# Patient Record
Sex: MALE | Race: ASIAN | NOT HISPANIC OR LATINO | URBAN - METROPOLITAN AREA
[De-identification: names, ages, dates, MRNs, and addresses within clinical notes are randomized per-mention and may not be internally consistent; named-entity substitution may affect disease eponyms.]

---

## 2021-12-22 ENCOUNTER — INPATIENT (INPATIENT)
Facility: HOSPITAL | Age: 56
LOS: 1 days | Discharge: HOME | DRG: 247 | End: 2021-12-24
Attending: HOSPITALIST | Admitting: INTERNAL MEDICINE
Payer: COMMERCIAL

## 2021-12-22 ENCOUNTER — EMERGENCY (EMERGENCY)
Facility: HOSPITAL | Age: 56
LOS: 1 days | Discharge: ANOTHER HOSPITAL | End: 2021-12-22
Attending: EMERGENCY MEDICINE
Payer: COMMERCIAL

## 2021-12-22 VITALS
DIASTOLIC BLOOD PRESSURE: 82 MMHG | TEMPERATURE: 98 F | RESPIRATION RATE: 18 BRPM | WEIGHT: 190.04 LBS | HEART RATE: 78 BPM | OXYGEN SATURATION: 98 % | HEIGHT: 71 IN | SYSTOLIC BLOOD PRESSURE: 134 MMHG

## 2021-12-22 VITALS
SYSTOLIC BLOOD PRESSURE: 131 MMHG | OXYGEN SATURATION: 99 % | TEMPERATURE: 98 F | HEIGHT: 71 IN | DIASTOLIC BLOOD PRESSURE: 78 MMHG | RESPIRATION RATE: 18 BRPM | HEART RATE: 81 BPM | WEIGHT: 188.27 LBS

## 2021-12-22 DIAGNOSIS — Z78.9 OTHER SPECIFIED HEALTH STATUS: Chronic | ICD-10-CM

## 2021-12-22 DIAGNOSIS — I21.3 ST ELEVATION (STEMI) MYOCARDIAL INFARCTION OF UNSPECIFIED SITE: ICD-10-CM

## 2021-12-22 LAB
ALBUMIN SERPL ELPH-MCNC: 3.3 G/DL — LOW (ref 3.5–5)
ALBUMIN SERPL ELPH-MCNC: 3.9 G/DL — SIGNIFICANT CHANGE UP (ref 3.3–5)
ALP SERPL-CCNC: 94 U/L — SIGNIFICANT CHANGE UP (ref 40–120)
ALP SERPL-CCNC: 98 U/L — SIGNIFICANT CHANGE UP (ref 40–120)
ALT FLD-CCNC: 29 U/L DA — SIGNIFICANT CHANGE UP (ref 10–60)
ALT FLD-CCNC: 50 U/L — HIGH (ref 10–45)
ANION GAP SERPL CALC-SCNC: 13 MMOL/L — SIGNIFICANT CHANGE UP (ref 5–17)
ANION GAP SERPL CALC-SCNC: 9 MMOL/L — SIGNIFICANT CHANGE UP (ref 5–17)
APTT BLD: 41.3 SEC — HIGH (ref 27.5–35.5)
AST SERPL-CCNC: 248 U/L — HIGH (ref 10–40)
AST SERPL-CCNC: 77 U/L — HIGH (ref 10–40)
BASOPHILS # BLD AUTO: 0.05 K/UL — SIGNIFICANT CHANGE UP (ref 0–0.2)
BASOPHILS NFR BLD AUTO: 0.3 % — SIGNIFICANT CHANGE UP (ref 0–2)
BILIRUB SERPL-MCNC: 0.5 MG/DL — SIGNIFICANT CHANGE UP (ref 0.2–1.2)
BILIRUB SERPL-MCNC: 0.5 MG/DL — SIGNIFICANT CHANGE UP (ref 0.2–1.2)
BLD GP AB SCN SERPL QL: NEGATIVE — SIGNIFICANT CHANGE UP
BUN SERPL-MCNC: 14 MG/DL — SIGNIFICANT CHANGE UP (ref 7–18)
BUN SERPL-MCNC: 14 MG/DL — SIGNIFICANT CHANGE UP (ref 7–23)
CALCIUM SERPL-MCNC: 9 MG/DL — SIGNIFICANT CHANGE UP (ref 8.4–10.5)
CALCIUM SERPL-MCNC: 9.7 MG/DL — SIGNIFICANT CHANGE UP (ref 8.4–10.5)
CHLORIDE SERPL-SCNC: 104 MMOL/L — SIGNIFICANT CHANGE UP (ref 96–108)
CHLORIDE SERPL-SCNC: 104 MMOL/L — SIGNIFICANT CHANGE UP (ref 96–108)
CK MB BLD-MCNC: 5.6 % — HIGH (ref 0–3.5)
CK MB CFR SERPL CALC: 129.5 NG/ML — HIGH (ref 0–6.7)
CK SERPL-CCNC: 2306 U/L — HIGH (ref 30–200)
CO2 SERPL-SCNC: 20 MMOL/L — LOW (ref 22–31)
CO2 SERPL-SCNC: 25 MMOL/L — SIGNIFICANT CHANGE UP (ref 22–31)
CREAT SERPL-MCNC: 1.33 MG/DL — HIGH (ref 0.5–1.3)
CREAT SERPL-MCNC: 1.42 MG/DL — HIGH (ref 0.5–1.3)
EOSINOPHIL # BLD AUTO: 0.37 K/UL — SIGNIFICANT CHANGE UP (ref 0–0.5)
EOSINOPHIL NFR BLD AUTO: 2.5 % — SIGNIFICANT CHANGE UP (ref 0–6)
GLUCOSE BLDC GLUCOMTR-MCNC: 185 MG/DL — HIGH (ref 70–99)
GLUCOSE BLDC GLUCOMTR-MCNC: 220 MG/DL — HIGH (ref 70–99)
GLUCOSE BLDC GLUCOMTR-MCNC: 249 MG/DL — HIGH (ref 70–99)
GLUCOSE SERPL-MCNC: 210 MG/DL — HIGH (ref 70–99)
GLUCOSE SERPL-MCNC: 255 MG/DL — HIGH (ref 70–99)
HCT VFR BLD CALC: 43.4 % — SIGNIFICANT CHANGE UP (ref 39–50)
HCT VFR BLD CALC: 47.9 % — SIGNIFICANT CHANGE UP (ref 39–50)
HGB BLD-MCNC: 14.7 G/DL — SIGNIFICANT CHANGE UP (ref 13–17)
HGB BLD-MCNC: 15.5 G/DL — SIGNIFICANT CHANGE UP (ref 13–17)
IMM GRANULOCYTES NFR BLD AUTO: 0.5 % — SIGNIFICANT CHANGE UP (ref 0–1.5)
INR BLD: 1.18 RATIO — HIGH (ref 0.88–1.16)
LYMPHOCYTES # BLD AUTO: 26.1 % — SIGNIFICANT CHANGE UP (ref 13–44)
LYMPHOCYTES # BLD AUTO: 3.89 K/UL — HIGH (ref 1–3.3)
MAGNESIUM SERPL-MCNC: 1.8 MG/DL — SIGNIFICANT CHANGE UP (ref 1.6–2.6)
MCHC RBC-ENTMCNC: 26.5 PG — LOW (ref 27–34)
MCHC RBC-ENTMCNC: 27.1 PG — SIGNIFICANT CHANGE UP (ref 27–34)
MCHC RBC-ENTMCNC: 32.4 GM/DL — SIGNIFICANT CHANGE UP (ref 32–36)
MCHC RBC-ENTMCNC: 33.9 GM/DL — SIGNIFICANT CHANGE UP (ref 32–36)
MCV RBC AUTO: 80.1 FL — SIGNIFICANT CHANGE UP (ref 80–100)
MCV RBC AUTO: 82 FL — SIGNIFICANT CHANGE UP (ref 80–100)
MONOCYTES # BLD AUTO: 0.91 K/UL — HIGH (ref 0–0.9)
MONOCYTES NFR BLD AUTO: 6.1 % — SIGNIFICANT CHANGE UP (ref 2–14)
NEUTROPHILS # BLD AUTO: 9.59 K/UL — HIGH (ref 1.8–7.4)
NEUTROPHILS NFR BLD AUTO: 64.5 % — SIGNIFICANT CHANGE UP (ref 43–77)
NRBC # BLD: 0 /100 WBCS — SIGNIFICANT CHANGE UP (ref 0–0)
NRBC # BLD: 0 /100 WBCS — SIGNIFICANT CHANGE UP (ref 0–0)
PHOSPHATE SERPL-MCNC: 3.2 MG/DL — SIGNIFICANT CHANGE UP (ref 2.5–4.5)
PLATELET # BLD AUTO: 199 K/UL — SIGNIFICANT CHANGE UP (ref 150–400)
PLATELET # BLD AUTO: 225 K/UL — SIGNIFICANT CHANGE UP (ref 150–400)
POTASSIUM SERPL-MCNC: 4 MMOL/L — SIGNIFICANT CHANGE UP (ref 3.5–5.3)
POTASSIUM SERPL-MCNC: 4 MMOL/L — SIGNIFICANT CHANGE UP (ref 3.5–5.3)
POTASSIUM SERPL-SCNC: 4 MMOL/L — SIGNIFICANT CHANGE UP (ref 3.5–5.3)
POTASSIUM SERPL-SCNC: 4 MMOL/L — SIGNIFICANT CHANGE UP (ref 3.5–5.3)
PROT SERPL-MCNC: 6.7 G/DL — SIGNIFICANT CHANGE UP (ref 6–8.3)
PROT SERPL-MCNC: 7.8 G/DL — SIGNIFICANT CHANGE UP (ref 6–8.3)
PROTHROM AB SERPL-ACNC: 13.9 SEC — HIGH (ref 10.6–13.6)
RBC # BLD: 5.42 M/UL — SIGNIFICANT CHANGE UP (ref 4.2–5.8)
RBC # BLD: 5.84 M/UL — HIGH (ref 4.2–5.8)
RBC # FLD: 14.4 % — SIGNIFICANT CHANGE UP (ref 10.3–14.5)
RBC # FLD: 14.6 % — HIGH (ref 10.3–14.5)
RH IG SCN BLD-IMP: POSITIVE — SIGNIFICANT CHANGE UP
SARS-COV-2 RNA SPEC QL NAA+PROBE: SIGNIFICANT CHANGE UP
SODIUM SERPL-SCNC: 137 MMOL/L — SIGNIFICANT CHANGE UP (ref 135–145)
SODIUM SERPL-SCNC: 138 MMOL/L — SIGNIFICANT CHANGE UP (ref 135–145)
TROPONIN I, HIGH SENSITIVITY RESULT: 4103.6 NG/L — HIGH
TROPONIN T, HIGH SENSITIVITY RESULT: 8759 NG/L — HIGH (ref 0–51)
TSH SERPL-MCNC: 1.57 UIU/ML — SIGNIFICANT CHANGE UP (ref 0.27–4.2)
WBC # BLD: 14.17 K/UL — HIGH (ref 3.8–10.5)
WBC # BLD: 14.88 K/UL — HIGH (ref 3.8–10.5)
WBC # FLD AUTO: 14.17 K/UL — HIGH (ref 3.8–10.5)
WBC # FLD AUTO: 14.88 K/UL — HIGH (ref 3.8–10.5)

## 2021-12-22 PROCEDURE — 99284 EMERGENCY DEPT VISIT MOD MDM: CPT

## 2021-12-22 PROCEDURE — 99222 1ST HOSP IP/OBS MODERATE 55: CPT

## 2021-12-22 PROCEDURE — 71045 X-RAY EXAM CHEST 1 VIEW: CPT | Mod: 26

## 2021-12-22 PROCEDURE — 99152 MOD SED SAME PHYS/QHP 5/>YRS: CPT

## 2021-12-22 PROCEDURE — 99285 EMERGENCY DEPT VISIT HI MDM: CPT | Mod: 25

## 2021-12-22 PROCEDURE — 96374 THER/PROPH/DIAG INJ IV PUSH: CPT

## 2021-12-22 PROCEDURE — 93458 L HRT ARTERY/VENTRICLE ANGIO: CPT | Mod: 26,59

## 2021-12-22 PROCEDURE — 85730 THROMBOPLASTIN TIME PARTIAL: CPT

## 2021-12-22 PROCEDURE — 87635 SARS-COV-2 COVID-19 AMP PRB: CPT

## 2021-12-22 PROCEDURE — 99291 CRITICAL CARE FIRST HOUR: CPT

## 2021-12-22 PROCEDURE — 93306 TTE W/DOPPLER COMPLETE: CPT | Mod: 26

## 2021-12-22 PROCEDURE — 84484 ASSAY OF TROPONIN QUANT: CPT

## 2021-12-22 PROCEDURE — 93010 ELECTROCARDIOGRAM REPORT: CPT

## 2021-12-22 PROCEDURE — 71045 X-RAY EXAM CHEST 1 VIEW: CPT

## 2021-12-22 PROCEDURE — 93005 ELECTROCARDIOGRAM TRACING: CPT

## 2021-12-22 PROCEDURE — 85610 PROTHROMBIN TIME: CPT

## 2021-12-22 PROCEDURE — 92941 PRQ TRLML REVSC TOT OCCL AMI: CPT | Mod: LD

## 2021-12-22 PROCEDURE — 36415 COLL VENOUS BLD VENIPUNCTURE: CPT

## 2021-12-22 PROCEDURE — 85025 COMPLETE CBC W/AUTO DIFF WBC: CPT

## 2021-12-22 PROCEDURE — 80053 COMPREHEN METABOLIC PANEL: CPT

## 2021-12-22 RX ORDER — INSULIN LISPRO 100/ML
VIAL (ML) SUBCUTANEOUS AT BEDTIME
Refills: 0 | Status: DISCONTINUED | OUTPATIENT
Start: 2021-12-22 | End: 2021-12-24

## 2021-12-22 RX ORDER — DEXTROSE 50 % IN WATER 50 %
25 SYRINGE (ML) INTRAVENOUS ONCE
Refills: 0 | Status: DISCONTINUED | OUTPATIENT
Start: 2021-12-22 | End: 2021-12-22

## 2021-12-22 RX ORDER — HEPARIN SODIUM 5000 [USP'U]/ML
INJECTION INTRAVENOUS; SUBCUTANEOUS
Qty: 25000 | Refills: 0 | Status: DISCONTINUED | OUTPATIENT
Start: 2021-12-22 | End: 2021-12-25

## 2021-12-22 RX ORDER — INSULIN LISPRO 100/ML
VIAL (ML) SUBCUTANEOUS
Refills: 0 | Status: DISCONTINUED | OUTPATIENT
Start: 2021-12-22 | End: 2021-12-24

## 2021-12-22 RX ORDER — DEXTROSE 50 % IN WATER 50 %
12.5 SYRINGE (ML) INTRAVENOUS ONCE
Refills: 0 | Status: DISCONTINUED | OUTPATIENT
Start: 2021-12-22 | End: 2021-12-22

## 2021-12-22 RX ORDER — TICAGRELOR 90 MG/1
90 TABLET ORAL EVERY 12 HOURS
Refills: 0 | Status: DISCONTINUED | OUTPATIENT
Start: 2021-12-22 | End: 2021-12-24

## 2021-12-22 RX ORDER — HEPARIN SODIUM 5000 [USP'U]/ML
5000 INJECTION INTRAVENOUS; SUBCUTANEOUS EVERY 8 HOURS
Refills: 0 | Status: DISCONTINUED | OUTPATIENT
Start: 2021-12-22 | End: 2021-12-24

## 2021-12-22 RX ORDER — TICAGRELOR 90 MG/1
180 TABLET ORAL ONCE
Refills: 0 | Status: COMPLETED | OUTPATIENT
Start: 2021-12-22 | End: 2021-12-22

## 2021-12-22 RX ORDER — HEPARIN SODIUM 5000 [USP'U]/ML
5000 INJECTION INTRAVENOUS; SUBCUTANEOUS EVERY 12 HOURS
Refills: 0 | Status: DISCONTINUED | OUTPATIENT
Start: 2021-12-22 | End: 2021-12-22

## 2021-12-22 RX ORDER — SODIUM CHLORIDE 9 MG/ML
1000 INJECTION, SOLUTION INTRAVENOUS
Refills: 0 | Status: DISCONTINUED | OUTPATIENT
Start: 2021-12-22 | End: 2021-12-22

## 2021-12-22 RX ORDER — ASPIRIN/CALCIUM CARB/MAGNESIUM 324 MG
81 TABLET ORAL DAILY
Refills: 0 | Status: DISCONTINUED | OUTPATIENT
Start: 2021-12-22 | End: 2021-12-24

## 2021-12-22 RX ORDER — HEPARIN SODIUM 5000 [USP'U]/ML
4000 INJECTION INTRAVENOUS; SUBCUTANEOUS ONCE
Refills: 0 | Status: COMPLETED | OUTPATIENT
Start: 2021-12-22 | End: 2021-12-22

## 2021-12-22 RX ORDER — METOPROLOL TARTRATE 50 MG
12.5 TABLET ORAL EVERY 12 HOURS
Refills: 0 | Status: DISCONTINUED | OUTPATIENT
Start: 2021-12-22 | End: 2021-12-23

## 2021-12-22 RX ORDER — DEXTROSE 50 % IN WATER 50 %
15 SYRINGE (ML) INTRAVENOUS ONCE
Refills: 0 | Status: DISCONTINUED | OUTPATIENT
Start: 2021-12-22 | End: 2021-12-22

## 2021-12-22 RX ORDER — HEPARIN SODIUM 5000 [USP'U]/ML
4000 INJECTION INTRAVENOUS; SUBCUTANEOUS EVERY 6 HOURS
Refills: 0 | Status: DISCONTINUED | OUTPATIENT
Start: 2021-12-22 | End: 2021-12-25

## 2021-12-22 RX ORDER — ASPIRIN/CALCIUM CARB/MAGNESIUM 324 MG
325 TABLET ORAL ONCE
Refills: 0 | Status: DISCONTINUED | OUTPATIENT
Start: 2021-12-22 | End: 2021-12-22

## 2021-12-22 RX ORDER — GLUCAGON INJECTION, SOLUTION 0.5 MG/.1ML
1 INJECTION, SOLUTION SUBCUTANEOUS ONCE
Refills: 0 | Status: DISCONTINUED | OUTPATIENT
Start: 2021-12-22 | End: 2021-12-22

## 2021-12-22 RX ORDER — CHLORHEXIDINE GLUCONATE 213 G/1000ML
1 SOLUTION TOPICAL
Refills: 0 | Status: DISCONTINUED | OUTPATIENT
Start: 2021-12-22 | End: 2021-12-24

## 2021-12-22 RX ORDER — INFLUENZA VIRUS VACCINE 15; 15; 15; 15 UG/.5ML; UG/.5ML; UG/.5ML; UG/.5ML
0.5 SUSPENSION INTRAMUSCULAR ONCE
Refills: 0 | Status: DISCONTINUED | OUTPATIENT
Start: 2021-12-22 | End: 2021-12-24

## 2021-12-22 RX ORDER — ATORVASTATIN CALCIUM 80 MG/1
80 TABLET, FILM COATED ORAL AT BEDTIME
Refills: 0 | Status: DISCONTINUED | OUTPATIENT
Start: 2021-12-22 | End: 2021-12-24

## 2021-12-22 RX ADMIN — HEPARIN SODIUM 5000 UNIT(S): 5000 INJECTION INTRAVENOUS; SUBCUTANEOUS at 23:01

## 2021-12-22 RX ADMIN — TICAGRELOR 180 MILLIGRAM(S): 90 TABLET ORAL at 08:55

## 2021-12-22 RX ADMIN — TICAGRELOR 90 MILLIGRAM(S): 90 TABLET ORAL at 17:57

## 2021-12-22 RX ADMIN — HEPARIN SODIUM 4000 UNIT(S): 5000 INJECTION INTRAVENOUS; SUBCUTANEOUS at 08:55

## 2021-12-22 RX ADMIN — Medication 2: at 17:28

## 2021-12-22 RX ADMIN — Medication 12.5 MILLIGRAM(S): at 18:17

## 2021-12-22 RX ADMIN — ATORVASTATIN CALCIUM 80 MILLIGRAM(S): 80 TABLET, FILM COATED ORAL at 23:01

## 2021-12-22 RX ADMIN — HEPARIN SODIUM 1000 UNIT(S)/HR: 5000 INJECTION INTRAVENOUS; SUBCUTANEOUS at 08:56

## 2021-12-22 RX ADMIN — Medication 1: at 13:20

## 2021-12-22 NOTE — H&P ADULT - NSHPPHYSICALEXAM_GEN_ALL_CORE
VITALS:   T(C): 36.4 (12-22-21 @ 10:36), Max: 36.8 (12-22-21 @ 08:21)  HR: 76 (12-22-21 @ 12:35) (75 - 81)  BP: 135/77 (12-22-21 @ 12:35) (128/77 - 135/77)  RR: 16 (12-22-21 @ 12:35) (14 - 21)  SpO2: 99% (12-22-21 @ 12:35) (98% - 99%)    GENERAL: NAD, lying in bed comfortably  HEAD:  Atraumatic, normocephalic  EYES: EOMI, PERRLA, conjunctiva and sclera clear  ENT: Moist mucous membranes  NECK: Supple, no JVD  HEART: Regular rate and rhythm, no murmurs, rubs, or gallops  LUNGS: Unlabored respirations.  Clear to auscultation bilaterally, no crackles, wheezing, or rhonchi  ABDOMEN: Soft, nontender, nondistended, +BS  EXTREMITIES: 2+ peripheral pulses bilaterally. No clubbing, cyanosis, or edema  NERVOUS SYSTEM:  A&Ox3, no focal deficits   SKIN: No rashes or lesions

## 2021-12-22 NOTE — H&P ADULT - ATTENDING COMMENTS
57yo M with PMH DM, HTN, HLD presented with STEMI from [facility] s/p PCI to the LAD    Continue DAPT  Likely will start low dose BB  check TTE  check TSH, lipids, HbA1C  DVT ppx with SQH

## 2021-12-22 NOTE — PROGRESS NOTE ADULT - SUBJECTIVE AND OBJECTIVE BOX
MARISSA MACHADO  MRN-[id number]  Patient is a 56y old  Male who presents with a chief complaint of STEMI (22 Dec 2021 13:15)    HPI:  57yo M with PMH DM, HTN, HLD presenting as transfer from [facility] for STEMI. Pt reports this morning he was taking a holistic medication when he started having 10/10 chest pain the radiated to his L arm. Also reports having nausea, SOB, and diaphoresis when chest pain occurred. Pt denies having any episodes of chest pain in the past. EKG at [facility] showed STEMI affecting anterior wall and septum. Pt went for cath and had DESx1 placed in pLAD. Cath also showed pRCA 70% and diag 70%; plan for staged PCI Thursday.    Currently pt denies chest pain or SOB.     Pt endorsed selectively taking medications prescribed to him; only consistently takes Humulin every day.  (22 Dec 2021 13:15)      Surgery/Hospital course:    Overnight events:    REVIEW OF SYSTEMS:    CONSTITUTIONAL: No weakness, fevers or chills  EYES/ENT: No visual changes;  No vertigo or throat pain   NECK: No pain or stiffness  RESPIRATORY: No cough, wheezing, hemoptysis; No shortness of breath  CARDIOVASCULAR: No chest pain or palpitations  GASTROINTESTINAL: No abdominal or epigastric pain. No nausea, vomiting, or hematemesis; No diarrhea or constipation. No melena or hematochezia.  GENITOURINARY: No dysuria, frequency or hematuria  NEUROLOGICAL: No numbness or weakness  SKIN: No itching, rashes      Physical Exam:  Vital Signs Last 24 Hrs  T(C): 37.1 (22 Dec 2021 19:00), Max: 37.1 (22 Dec 2021 19:00)  T(F): 98.8 (22 Dec 2021 19:00), Max: 98.8 (22 Dec 2021 19:00)  HR: 82 (22 Dec 2021 20:30) (72 - 92)  BP: 114/71 (22 Dec 2021 20:30) (108/60 - 139/81)  BP(mean): 88 (22 Dec 2021 20:30) (78 - 102)  RR: 23 (22 Dec 2021 20:30) (13 - 27)  SpO2: 99% (22 Dec 2021 20:30) (97% - 99%)  Physical Exam:   Constitutional: NAD, well-groomed, well-developed  HEENT: PERRLA, EOMI, no drainage or redness  Neck: supple,  No JVD  Respiratory: Breath Sounds equal & clear bilaterally to auscultation, no rales/rhonchi/wheezing, no accessory muscle use noted  Cardiovascular: Regular rate, regular rhythm, normal S1, S2; no murmurs or rub  Gastrointestinal: Soft, non-tender, non distended, + bowel sounds  Extremities: MATHEW x 4, no peripheral edema, no cyanosis, no clubbing   Neurological: A+O x 3; speech clear and intact; no sensory, motor  deficits, normal reflexes  Skin: warm, dry, well perfused  Incisions:    ============================I/O===========================   I&O's Detail    22 Dec 2021 07:01  -  22 Dec 2021 23:24  --------------------------------------------------------  IN:    Oral Fluid: 120 mL  Total IN: 120 mL    OUT:    Voided (mL): 1000 mL  Total OUT: 1000 mL    Total NET: -880 mL        ============================ LABS =========================                        14.7   14.17 )-----------( 199      ( 22 Dec 2021 16:21 )             43.4     12-22    137  |  104  |  14  ----------------------------<  255<H>  4.0   |  20<L>  |  1.33<H>    Ca    9.0      22 Dec 2021 16:19  Phos  3.2     12-22  Mg     1.8     12-22    TPro  6.7  /  Alb  3.9  /  TBili  0.5  /  DBili  x   /  AST  248<H>  /  ALT  50<H>  /  AlkPhos  94  12-22    LIVER FUNCTIONS - ( 22 Dec 2021 16:19 )  Alb: 3.9 g/dL / Pro: 6.7 g/dL / ALK PHOS: 94 U/L / ALT: 50 U/L / AST: 248 U/L / GGT: x           PT/INR - ( 22 Dec 2021 08:35 )   PT: 13.9 sec;   INR: 1.18 ratio         PTT - ( 22 Dec 2021 08:35 )  PTT:41.3 sec      ======================Micro/Rad/Cardio=================  Culture: Reviewed   CXR: Reviewed  Echo:Reviewed  ======================================================  PAST MEDICAL & SURGICAL HISTORY:  Diabetes    Hypertension    Hyperlipidemia    No pertinent past surgical history      ====================ASSESSMENT ==============  CNS:  RES:  CVS:  Hem:  Kory:  GI:  Endo:  ID:  Skin  Plan:  ====================== NEUROLOGY=====================    ==================== RESPIRATORY======================  Mechanical Ventilation:      ====================CARDIOVASCULAR==================  metoprolol tartrate 12.5 milliGRAM(s) Oral every 12 hours    ===================HEMATOLOGIC/ONC ===================  aspirin  chewable 81 milliGRAM(s) Oral daily  heparin   Injectable 5000 Unit(s) SubCutaneous every 8 hours  ticagrelor 90 milliGRAM(s) Oral every 12 hours    ===================== RENAL =========================  Continue monitoring urine output    ==================== GASTROINTESTINAL===================    =======================    ENDOCRINE  =====================  atorvastatin 80 milliGRAM(s) Oral at bedtime  insulin lispro (ADMELOG) corrective regimen sliding scale   SubCutaneous three times a day before meals  insulin lispro (ADMELOG) corrective regimen sliding scale   SubCutaneous at bedtime    ========================INFECTIOUS DISEASE================      ========================PROPHYLACTIC MEASURE================  DVT  GI Protonix  Graft patency   Beta blocker      Patient requires continuous monitoring with bedside rhythm monitoring, arterial line, pulse oximetry, ventilator monitoring and intermittent blood gas analysis.  Care plan discussed with ICU care team.  Patient remained critical; required more than usual post op care; I have spent 35 minutes providing non-routine post op care, revaluated multiple times during the day.         MARISSA MACHADO  MRN-[id number]  Patient is a 56y old  Male who presents with a chief complaint of STEMI (22 Dec 2021 13:15)    HPI:  57yo M with PMH DM, HTN, HLD presenting as transfer from [facility] for STEMI. Pt reports this morning he was taking a holistic medication when he started having 10/10 chest pain the radiated to his L arm. Also reports having nausea, SOB, and diaphoresis when chest pain occurred. Pt denies having any episodes of chest pain in the past. EKG at [facility] showed STEMI affecting anterior wall and septum. Pt went for cath and had DESx1 placed in pLAD. Cath also showed pRCA 70% and diag 70%; plan for staged PCI Thursday.    Currently pt denies chest pain or SOB.     Pt endorsed selectively taking medications prescribed to him; only consistently takes Humulin every day.  (22 Dec 2021 13:15)      REVIEW OF SYSTEMS:    CONSTITUTIONAL: No weakness, fevers or chills  EYES/ENT: No visual changes;  No vertigo or throat pain   NECK: No pain or stiffness  RESPIRATORY: No cough, wheezing, hemoptysis; No shortness of breath  CARDIOVASCULAR: No chest pain or palpitations  GASTROINTESTINAL: No abdominal or epigastric pain. No nausea, vomiting, or hematemesis; No diarrhea or constipation. No melena or hematochezia.  GENITOURINARY: No dysuria, frequency or hematuria  NEUROLOGICAL: No numbness or weakness  SKIN: No itching, rashes      Physical Exam:  Vital Signs Last 24 Hrs  T(C): 37.1 (22 Dec 2021 19:00), Max: 37.1 (22 Dec 2021 19:00)  T(F): 98.8 (22 Dec 2021 19:00), Max: 98.8 (22 Dec 2021 19:00)  HR: 82 (22 Dec 2021 20:30) (72 - 92)  BP: 114/71 (22 Dec 2021 20:30) (108/60 - 139/81)  BP(mean): 88 (22 Dec 2021 20:30) (78 - 102)  RR: 23 (22 Dec 2021 20:30) (13 - 27)  SpO2: 99% (22 Dec 2021 20:30) (97% - 99%)    ============================I/O===========================   I&O's Detail    22 Dec 2021 07:01  -  22 Dec 2021 23:24  --------------------------------------------------------  IN:    Oral Fluid: 120 mL  Total IN: 120 mL    OUT:    Voided (mL): 1000 mL  Total OUT: 1000 mL    Total NET: -880 mL        ============================ LABS =========================                        14.7   14.17 )-----------( 199      ( 22 Dec 2021 16:21 )             43.4     12-22    137  |  104  |  14  ----------------------------<  255<H>  4.0   |  20<L>  |  1.33<H>    Ca    9.0      22 Dec 2021 16:19  Phos  3.2     12-22  Mg     1.8     12-22    TPro  6.7  /  Alb  3.9  /  TBili  0.5  /  DBili  x   /  AST  248<H>  /  ALT  50<H>  /  AlkPhos  94  12-22    LIVER FUNCTIONS - ( 22 Dec 2021 16:19 )  Alb: 3.9 g/dL / Pro: 6.7 g/dL / ALK PHOS: 94 U/L / ALT: 50 U/L / AST: 248 U/L / GGT: x           PT/INR - ( 22 Dec 2021 08:35 )   PT: 13.9 sec;   INR: 1.18 ratio         PTT - ( 22 Dec 2021 08:35 )  PTT:41.3 sec      ======================Micro/Rad/Cardio=================  Culture: Reviewed   CXR: Reviewed  Echo:Reviewed  ======================================================  PAST MEDICAL & SURGICAL HISTORY:  Diabetes    Hypertension    Hyperlipidemia    No pertinent past surgical history      ====================ASSESSMENT ==============  57yo M with PMH DM, HTN, HLD presenting as transfer from [place] for STEMI s/p DESx1 to pLAD    Plan:  ====================== NEUROLOGY=====================  No active issues  - AAOx4     ==================== RESPIRATORY======================  No active issues  - SpO2> 94% on RA    ====================CARDIOVASCULAR==================  STEMI   - ST elevations in V2/V3/V4 now s/p DESx1 pLAD  - Continue to trend cardiac enzymes  - Brillinta and ASA for DAPT  - continue to monitor on telemetry  - Continue lopressor 12.5mg BID, Lipitor 80 QD  - TTE 12/22: Moderate-severe segmental left ventricular systolic dysfunction. No left ventricular thrombus. The mid anterior wall, and the mid inferoseptum are hypokinetic. The apical inferior wall, the apical anterior wall, the apical septum,  and the apical lateral wall are akinetic. Mild diastolic dysfunction (Stage I).  - plan for staged PCI Thursday    HTN, chronic  - Pt reports being prescribed anaprilin but does not take it  - hold ACEi/ARB until Cr improves      ===================HEMATOLOGIC/ONC ===================  No active issues  - H & H stable  - Continue to monitor    DVT Prophylaxis  - Continue SubQ heparin      ===================== RENAL =========================  MISTI  - Unknown baseline Cr]  - Currently 1.33  - Likely 2/2 hypoperfusion   - Continue to trend Cr   - monitor urine output       ==================== GASTROINTESTINAL===================  No active issues  - tolerating DASH diet  =======================    ENDOCRINE  =====================  DM  - history of DM, reports taking Humulin 20u BID at home; was also prescribed metformin but does not take it   - f/u A1c   - Continue ISS  - Monitor finger sticks     ========================INFECTIOUS DISEASE================  Leukocytosis  - likely reactive in setting of MI, no fevers in past 24 hrs  - continue to monitor fever curve/leukocytosis   - monitor off abx for now             MARISSA MACHADO  MRN-[id number]  Patient is a 56y old  Male who presents with a chief complaint of STEMI (22 Dec 2021 13:15)    HPI:  57yo M with PMH DM, HTN, HLD presenting as transfer from [place] for STEMI. Pt reports this morning he was taking a holistic medication when he started having 10/10 chest pain the radiated to his L arm. Also reports having nausea, SOB, and diaphoresis when chest pain occurred. Pt denies having any episodes of chest pain in the past. EKG at [place] showed STEMI affecting anterior wall and septum. Pt went for cath and had DESx1 placed in pLAD. Cath also showed pRCA 70% and diag 70%; plan for staged PCI Thursday.    Currently pt denies chest pain or SOB.     Pt endorsed selectively taking medications prescribed to him; only consistently takes Humulin every day.  (22 Dec 2021 13:15)      REVIEW OF SYSTEMS:    CONSTITUTIONAL: No weakness, fevers or chills  EYES/ENT: No visual changes;  No vertigo or throat pain   NECK: No pain or stiffness  RESPIRATORY: No cough, wheezing, hemoptysis; No shortness of breath  CARDIOVASCULAR: No chest pain or palpitations  GASTROINTESTINAL: No abdominal or epigastric pain. No nausea, vomiting, or hematemesis; No diarrhea or constipation. No melena or hematochezia.  GENITOURINARY: No dysuria, frequency or hematuria  NEUROLOGICAL: No numbness or weakness  SKIN: No itching, rashes      Physical Exam:  Vital Signs Last 24 Hrs  T(C): 37.1 (22 Dec 2021 19:00), Max: 37.1 (22 Dec 2021 19:00)  T(F): 98.8 (22 Dec 2021 19:00), Max: 98.8 (22 Dec 2021 19:00)  HR: 82 (22 Dec 2021 20:30) (72 - 92)  BP: 114/71 (22 Dec 2021 20:30) (108/60 - 139/81)  BP(mean): 88 (22 Dec 2021 20:30) (78 - 102)  RR: 23 (22 Dec 2021 20:30) (13 - 27)  SpO2: 99% (22 Dec 2021 20:30) (97% - 99%)    ============================I/O===========================   I&O's Detail    22 Dec 2021 07:01  -  22 Dec 2021 23:24  --------------------------------------------------------  IN:    Oral Fluid: 120 mL  Total IN: 120 mL    OUT:    Voided (mL): 1000 mL  Total OUT: 1000 mL    Total NET: -880 mL        ============================ LABS =========================                        14.7   14.17 )-----------( 199      ( 22 Dec 2021 16:21 )             43.4     12-22    137  |  104  |  14  ----------------------------<  255<H>  4.0   |  20<L>  |  1.33<H>    Ca    9.0      22 Dec 2021 16:19  Phos  3.2     12-22  Mg     1.8     12-22    TPro  6.7  /  Alb  3.9  /  TBili  0.5  /  DBili  x   /  AST  248<H>  /  ALT  50<H>  /  AlkPhos  94  12-22    LIVER FUNCTIONS - ( 22 Dec 2021 16:19 )  Alb: 3.9 g/dL / Pro: 6.7 g/dL / ALK PHOS: 94 U/L / ALT: 50 U/L / AST: 248 U/L / GGT: x           PT/INR - ( 22 Dec 2021 08:35 )   PT: 13.9 sec;   INR: 1.18 ratio         PTT - ( 22 Dec 2021 08:35 )  PTT:41.3 sec      ======================Micro/Rad/Cardio=================  Culture: Reviewed   CXR: Reviewed  Echo:Reviewed  ======================================================  PAST MEDICAL & SURGICAL HISTORY:  Diabetes    Hypertension    Hyperlipidemia    No pertinent past surgical history      ====================ASSESSMENT ==============  57yo M with PMH DM, HTN, HLD presenting as transfer from [place] for STEMI s/p DESx1 to pLAD    Plan:  ====================== NEUROLOGY=====================  No active issues  - AAOx4     ==================== RESPIRATORY======================  No active issues  - SpO2> 94% on RA    ====================CARDIOVASCULAR==================  STEMI   - ST elevations in V2/V3/V4 now s/p DESx1 pLAD  - Continue to trend cardiac enzymes  - Brillinta and ASA for DAPT  - continue to monitor on telemetry  - Continue lopressor 12.5mg BID, Lipitor 80 QD  - TTE 12/22: Moderate-severe segmental left ventricular systolic dysfunction. No left ventricular thrombus. The mid anterior wall, and the mid inferoseptum are hypokinetic. The apical inferior wall, the apical anterior wall, the apical septum,  and the apical lateral wall are akinetic. Mild diastolic dysfunction (Stage I).  - plan for staged PCI Thursday    HTN, chronic  - Pt reports being prescribed anaprilin but does not take it  - hold ACEi/ARB until Cr improves      ===================HEMATOLOGIC/ONC ===================  No active issues  - H & H stable  - Continue to monitor    DVT Prophylaxis  - Continue SubQ heparin      ===================== RENAL =========================  MISTI  - Unknown baseline Cr]  - Currently 1.33  - Likely 2/2 hypoperfusion   - Continue to trend Cr   - monitor urine output       ==================== GASTROINTESTINAL===================  No active issues  - tolerating DASH diet  =======================    ENDOCRINE  =====================  DM  - history of DM, reports taking Humulin 20u BID at home; was also prescribed metformin but does not take it   - f/u A1c   - Continue ISS  - Monitor finger sticks     ========================INFECTIOUS DISEASE================  Leukocytosis  - likely reactive in setting of MI, no fevers in past 24 hrs  - continue to monitor fever curve/leukocytosis   - monitor off abx for now    Patient requires continuous monitoring with bedside rhythm monitoring, pulse ox monitoring, and intermittent blood gas analysis. Care plan discussed with ICU care team. Patient remained critical and at risk for life threatening decompensation.  Patient seen, examined and plan discussed with CCU team during rounds.     I have personally provided 35 minutes of critical care time excluding time spent on separate procedures, in addition to initial critical care time provided by the CICU Attending, Dr. Velasco.           MARISSA MACHADO  MRN-[id number]  Patient is a 56y old  Male who presents with a chief complaint of STEMI (22 Dec 2021 13:15)    HPI:  55yo M with PMH DM, HTN, HLD presenting as transfer from [place] for STEMI. Pt reports this morning he was taking a holistic medication when he started having 10/10 chest pain the radiated to his L arm. Also reports having nausea, SOB, and diaphoresis when chest pain occurred. Pt denies having any episodes of chest pain in the past. EKG at [facility] showed STEMI affecting anterior wall and septum. Pt went for cath and had DESx1 placed in pLAD. Cath also showed pRCA 70% and diag 70%; plan for staged PCI Thursday.    Currently pt denies chest pain or SOB.     Pt endorsed selectively taking medications prescribed to him; only consistently takes Humulin every day.  (22 Dec 2021 13:15)    Overnight events: no acute events    REVIEW OF SYSTEMS:    CONSTITUTIONAL: No weakness, fevers or chills  EYES/ENT: No visual changes;  No vertigo or throat pain   NECK: No pain or stiffness  RESPIRATORY: No cough, wheezing, hemoptysis; No shortness of breath  CARDIOVASCULAR: No chest pain or palpitations  GASTROINTESTINAL: No abdominal or epigastric pain. No nausea, vomiting, or hematemesis; No diarrhea or constipation. No melena or hematochezia.  GENITOURINARY: No dysuria, frequency or hematuria  NEUROLOGICAL: No numbness or weakness  SKIN: No itching, rashes    Vital Signs Last 24 Hrs  T(C): 37.1 (22 Dec 2021 19:00), Max: 37.1 (22 Dec 2021 19:00)  T(F): 98.8 (22 Dec 2021 19:00), Max: 98.8 (22 Dec 2021 19:00)  HR: 82 (22 Dec 2021 20:30) (72 - 92)  BP: 114/71 (22 Dec 2021 20:30) (108/60 - 139/81)  BP(mean): 88 (22 Dec 2021 20:30) (78 - 102)  RR: 23 (22 Dec 2021 20:30) (13 - 27)  SpO2: 99% (22 Dec 2021 20:30) (97% - 99%)    ============================I/O===========================   I&O's Detail    22 Dec 2021 07:01  -  22 Dec 2021 23:24  --------------------------------------------------------  IN:    Oral Fluid: 120 mL  Total IN: 120 mL    OUT:    Voided (mL): 1000 mL  Total OUT: 1000 mL    Total NET: -880 mL        ============================ LABS =========================                        14.7   14.17 )-----------( 199      ( 22 Dec 2021 16:21 )             43.4     12-22    137  |  104  |  14  ----------------------------<  255<H>  4.0   |  20<L>  |  1.33<H>    Ca    9.0      22 Dec 2021 16:19  Phos  3.2     12-22  Mg     1.8     12-22    TPro  6.7  /  Alb  3.9  /  TBili  0.5  /  DBili  x   /  AST  248<H>  /  ALT  50<H>  /  AlkPhos  94  12-22    LIVER FUNCTIONS - ( 22 Dec 2021 16:19 )  Alb: 3.9 g/dL / Pro: 6.7 g/dL / ALK PHOS: 94 U/L / ALT: 50 U/L / AST: 248 U/L / GGT: x           PT/INR - ( 22 Dec 2021 08:35 )   PT: 13.9 sec;   INR: 1.18 ratio         PTT - ( 22 Dec 2021 08:35 )  PTT:41.3 sec      ======================Micro/Rad/Cardio=================  Culture: Reviewed   CXR: Reviewed  Echo:Reviewed  ======================================================  PAST MEDICAL & SURGICAL HISTORY:  Diabetes    Hypertension    Hyperlipidemia    No pertinent past surgical history      ====================ASSESSMENT ==============  55yo M with PMH DM, HTN, HLD presenting as transfer from [place] for STEMI s/p DESx1 to pLAD    Plan:  ====================== NEUROLOGY=====================  No active issues  - AAOx4     ==================== RESPIRATORY======================  No active issues  - SpO2> 94% on RA    ====================CARDIOVASCULAR==================  STEMI   - ST elevations in V2/V3/V4 now s/p DESx1 pLAD  - Continue to trend cardiac enzymes  - Brillinta and ASA for DAPT  - continue to monitor on telemetry  - Continue lopressor 12.5mg BID, Lipitor 80 QD  - TTE 12/22: Moderate-severe segmental left ventricular systolic dysfunction. No left ventricular thrombus. The mid anterior wall, and the mid inferoseptum are hypokinetic. The apical inferior wall, the apical anterior wall, the apical septum,  and the apical lateral wall are akinetic. Mild diastolic dysfunction (Stage I).  - plan for staged PCI Thursday    HTN, chronic  - Pt reports being prescribed anaprilin but does not take it  - hold ACEi/ARB until Cr improves      ===================HEMATOLOGIC/ONC ===================  No active issues  - H & H stable  - Continue to monitor    DVT Prophylaxis  - Continue SubQ heparin      ===================== RENAL =========================  MISTI  - Unknown baseline Cr  - Currently 1.33  - Likely 2/2 hypoperfusion   - Continue to trend Cr   - monitor urine output       ==================== GASTROINTESTINAL===================  No active issues  - tolerating DASH diet    =======================    ENDOCRINE  =====================  DM  - history of DM, reports taking Humulin 20u BID at home; was also prescribed metformin but does not take it   - f/u A1c   - Lantus 12 u at bedtime, pre meal 4 units  - Continue ISS  - Monitor finger sticks     ========================INFECTIOUS DISEASE================  Leukocytosis  - likely reactive in setting of MI, no fevers in past 24 hrs  - continue to monitor fever curve/leukocytosis   - monitor off abx for now    Patient requires continuous monitoring with bedside rhythm monitoring, pulse ox monitoring, and intermittent blood gas analysis. Care plan discussed with ICU care team. Patient remained critical and at risk for life threatening decompensation.  Patient seen, examined and plan discussed with CCU team during rounds.     I have personally provided 35 minutes of critical care time excluding time spent on separate procedures, in addition to initial critical care time provided by the CICU Attending, Dr. Velasco.

## 2021-12-22 NOTE — H&P ADULT - HISTORY OF PRESENT ILLNESS
55yo M with PMH DM, HTN, HLD presenting as transfer from [place] for STEMI. Pt reports this morning he was taking a holistic medication when he started having 10/10 chest pain the radiated to his L arm. Also reports having nausea, SOB, and diaphoresis when chest pain occurred. Pt denies having any episodes of chest pain in the past. EKG at [place] showed STEMI affecting anterior wall and septum. Pt went for cath and has DESx1 placed in pLAD; cath also showed pRCA 70% and diag 70%.     Currently pt denies chest pain or SOB.     Pt endorsed selectively taking medications prescribed to him.  57yo M with PMH DM, HTN, HLD presenting as transfer from [place] for STEMI. Pt reports this morning he was taking a holistic medication when he started having 10/10 chest pain the radiated to his L arm. Also reports having nausea, SOB, and diaphoresis when chest pain occurred. Pt denies having any episodes of chest pain in the past. EKG at [place] showed STEMI affecting anterior wall and septum. Pt went for cath and has DESx1 placed in pLAD; cath also showed pRCA 70% and diag 70%.     Currently pt denies chest pain or SOB.     Pt endorsed selectively taking medications prescribed to him; only consistently takes Humulin every day.  55yo M with PMH DM, HTN, HLD presenting as transfer from [place] for STEMI. Pt reports this morning he was taking a holistic medication when he started having 10/10 chest pain the radiated to his L arm. Also reports having nausea, SOB, and diaphoresis when chest pain occurred. Pt denies having any episodes of chest pain in the past. EKG at [place] showed STEMI affecting anterior wall and septum. Pt went for cath and had DESx1 placed in pLAD. Cath also showed pRCA 70% and diag 70%; plan for staged PCI Thursday.    Currently pt denies chest pain or SOB.     Pt endorsed selectively taking medications prescribed to him; only consistently takes Humulin every day.

## 2021-12-22 NOTE — ED PROVIDER NOTE - OBJECTIVE STATEMENT
55 y/o male with PMHx of DM, HTN, HLD presents with chest pain.  As per patient chest pain began this morning, associated with shortness of breath.  pt does endorse smoking marijuana.  pt is covid vaccinated.

## 2021-12-22 NOTE — CHART NOTE - NSCHARTNOTEFT_GEN_A_CORE
Removal of Femoral Sheath    Pulses in the right lower extremity are palpable. The patient was placed in the supine position. The insertion site was identified and the sutures were removed per protocol.  The _6___ Amharic femoral sheath was then removed. Direct pressure was applied for  ____24__ minutes.     Monitoring of the right groin and both lower extremities including neuro-vascular checks and vital signs every 15 minutes x 4, then every 30 minutes x 2, then every 1 hour was ordered.    Complications: None    Chiki Rivera PA-C CICU

## 2021-12-22 NOTE — ED ADULT NURSE NOTE - OBJECTIVE STATEMENT
Pt. c/o chest pain that started at 630 am with nausea. Pt. is diaphoretic upon arrival tot he ED. Pt. denies any shortness of breath.

## 2021-12-22 NOTE — H&P ADULT - ASSESSMENT
57yo M with PMH DM, HTN, HLD presenting as transfer from [place] [facility]  55yo M with PMH DM, HTN, HLD presenting as transfer from [place] for STEMI s/p DESx1 to [facility].     #Neuro  - no active issues  - A/O x 3, no focal deficits on exam    #Pulmonary  - currently saturating well on RA  - no active issues    #Cardiovascular  STEMI   - ST elevations in V2/V3/V4 now s/p DESx1 pLAD  - Trop-T 4103, continue to trend cardiac markers   - start brilinta and aspirin  - continue to monitor on telemetry  - start lopressor 12.5mg BID  - f/u TTE  - plan for staged PCI Thursday    HTN  - history of hypertension, reports being prescribed anaprilin but does not take it  - hold ACEi/ARB until Cr normalizes     #GI  - no active issues    #Renal  - Cr 1.42, unknown baseline  - monitor Cr, BUN, urine output     #Endocrine  DM  - elevated blood sugars on CMP  - history of DM, reports taking Humalin 20u BID at home; was also prescribed metformin but does not take it   - f/u A1c   - ISS, monitor BGs    #ID  - leukocytosis likely reactive, no fevers in past 24 hrs  - continue to monitor fever curve/leukocytosis     #Heme  - no active issues  - heparin subq 57yo M with PMH DM, HTN, HLD presenting as [facility] [place] [facility]     #Neuro  - no active issues  - A/O x 3, no focal deficits on exam    #Pulmonary  - currently saturating well on RA  - no active issues    #Cardiovascular  STEMI   - ST elevations in V2/V3/V4 now s/p DESx1 pLAD  - Trop-T 4103, continue to trend cardiac markers   - start brilinta and aspirin  - continue to monitor on telemetry  - start lopressor 12.5mg BID  - f/u TTE  - plan for staged PCI Thursday    HTN  - history of hypertension, reports being prescribed anaprilin but does not take it  - hold ACEi/ARB until Cr normalizes     #GI  - no active issues    #Renal  - Cr 1.42, unknown baseline  - monitor Cr, BUN, urine output     #Endocrine  DM  - elevated blood sugars on CMP  - history of DM, reports taking Humulin 20u BID at home; was also prescribed metformin but does not take it   - f/u A1c   - ISS, monitor BGs    #ID  - leukocytosis likely reactive, no fevers in past 24 hrs  - continue to monitor fever curve/leukocytosis     #Heme  - no active issues  - heparin subq

## 2021-12-22 NOTE — PATIENT PROFILE ADULT - DO YOU LACK THE NECESSARY SUPPORT TO HELP YOU COPE WITH LIFE CHALLENGES?
PLEASE CLICK THE EDIT BUTTON, ENTER YOUR RESPONSE TO THE QUERY AND SIGN THE NOTE.    Dr. Hernandez/ Dr. Perry,    Noted the following: H&P shows history of diabetes mellitus .   Pt is on scheduled and sliding scale insulin.  Bedside glucose monitoring is ordered.   Pt is also on Neurontin.      Would you please specify the type of diabetes? Such as:  TYPE 2  • Type 1 or Type 2  • Secondary to other condition (congenital rubella, Cushing’s syndrome, cystic fibrosis, malignant neoplasm, malnutrition, pancreatitis and other diseases of the pancreas, Drug or chemical induced,  post procedural DM)  • Other___________  • Unable to determine     Please also specify any manifestations of the diabetes.  Examples include: None  • Diabetic Nephropathy  • Diabetic Retinopathy  • Diabetic Neuropathy  • Diabetic Gangrene  • Diabetic Gastroparesis  • Diabetic Osteomyelitis  • Diabetic (specify) Ulcer  • Other________  • Without complication  • Unable to determine    Thank You for your time and clarification,  Francesca Valente RN,BSN,CCDS  Clinical   [facility]  Ph# [phone]  [email or web address]     Please document your response here and in subsequent progress notes when appropriate:       no

## 2021-12-22 NOTE — ED ADULT NURSE NOTE - NSIMPLEMENTINTERV_GEN_ALL_ED
Implemented All Universal Safety Interventions:  [place] to call system. Call bell, personal items and telephone within reach. Instruct patient to call for assistance. Room bathroom lighting operational. Non-slip footwear when patient is off stretcher. Physically safe environment: no spills, clutter or unnecessary equipment. Stretcher in lowest position, wheels locked, appropriate side rails in place.

## 2021-12-22 NOTE — PATIENT PROFILE ADULT - FALL HARM RISK - UNIVERSAL INTERVENTIONS
Bed in lowest position, wheels locked, appropriate side rails in place/Call bell, personal items and telephone in reach/Instruct patient to call for assistance before getting out of bed or chair/Non-slip footwear when patient is out of bed/[place] to call system/Physically safe environment - no spills, clutter or unnecessary equipment/Purposeful Proactive Rounding/Room/bathroom lighting operational, light cord in reach

## 2021-12-22 NOTE — H&P ADULT - NSHPSOCIALHISTORY_GEN_ALL_CORE
Reports intermittent tobacco use in the past but has not used in 25 years. Reports smoking 1lb of marijuana a month. Denies alcohol use.

## 2021-12-23 DIAGNOSIS — I10 ESSENTIAL (PRIMARY) HYPERTENSION: ICD-10-CM

## 2021-12-23 DIAGNOSIS — D72.829 ELEVATED WHITE BLOOD CELL COUNT, UNSPECIFIED: ICD-10-CM

## 2021-12-23 DIAGNOSIS — E11.9 TYPE 2 DIABETES MELLITUS WITHOUT COMPLICATIONS: ICD-10-CM

## 2021-12-23 DIAGNOSIS — N17.9 ACUTE KIDNEY FAILURE, UNSPECIFIED: ICD-10-CM

## 2021-12-23 DIAGNOSIS — Z29.9 ENCOUNTER FOR PROPHYLACTIC MEASURES, UNSPECIFIED: ICD-10-CM

## 2021-12-23 DIAGNOSIS — I21.3 ST ELEVATION (STEMI) MYOCARDIAL INFARCTION OF UNSPECIFIED SITE: ICD-10-CM

## 2021-12-23 LAB
A1C WITH ESTIMATED AVERAGE GLUCOSE RESULT: 8.9 % — HIGH (ref 4–5.6)
A1C WITH ESTIMATED AVERAGE GLUCOSE RESULT: 9.1 % — HIGH (ref 4–5.6)
ALBUMIN SERPL ELPH-MCNC: 4.1 G/DL — SIGNIFICANT CHANGE UP (ref 3.3–5)
ALP SERPL-CCNC: 100 U/L — SIGNIFICANT CHANGE UP (ref 40–120)
ALT FLD-CCNC: 48 U/L — HIGH (ref 10–45)
ANION GAP SERPL CALC-SCNC: 14 MMOL/L — SIGNIFICANT CHANGE UP (ref 5–17)
AST SERPL-CCNC: 167 U/L — HIGH (ref 10–40)
BILIRUB SERPL-MCNC: 0.4 MG/DL — SIGNIFICANT CHANGE UP (ref 0.2–1.2)
BUN SERPL-MCNC: 14 MG/DL — SIGNIFICANT CHANGE UP (ref 7–23)
CALCIUM SERPL-MCNC: 9.2 MG/DL — SIGNIFICANT CHANGE UP (ref 8.4–10.5)
CHLORIDE SERPL-SCNC: 103 MMOL/L — SIGNIFICANT CHANGE UP (ref 96–108)
CHOLEST SERPL-MCNC: 220 MG/DL — HIGH
CK MB BLD-MCNC: 4.9 % — HIGH (ref 0–3.5)
CK MB CFR SERPL CALC: 71.6 NG/ML — HIGH (ref 0–6.7)
CK SERPL-CCNC: 1456 U/L — HIGH (ref 30–200)
CO2 SERPL-SCNC: 19 MMOL/L — LOW (ref 22–31)
CREAT SERPL-MCNC: 1.15 MG/DL — SIGNIFICANT CHANGE UP (ref 0.5–1.3)
ESTIMATED AVERAGE GLUCOSE: 209 MG/DL — HIGH (ref 68–114)
ESTIMATED AVERAGE GLUCOSE: 214 MG/DL — HIGH (ref 68–114)
GLUCOSE BLDC GLUCOMTR-MCNC: 184 MG/DL — HIGH (ref 70–99)
GLUCOSE BLDC GLUCOMTR-MCNC: 203 MG/DL — HIGH (ref 70–99)
GLUCOSE BLDC GLUCOMTR-MCNC: 226 MG/DL — HIGH (ref 70–99)
GLUCOSE BLDC GLUCOMTR-MCNC: 255 MG/DL — HIGH (ref 70–99)
GLUCOSE SERPL-MCNC: 258 MG/DL — HIGH (ref 70–99)
HCT VFR BLD CALC: 45.7 % — SIGNIFICANT CHANGE UP (ref 39–50)
HDLC SERPL-MCNC: 52 MG/DL — SIGNIFICANT CHANGE UP
HGB BLD-MCNC: 15 G/DL — SIGNIFICANT CHANGE UP (ref 13–17)
LIPID PNL WITH DIRECT LDL SERPL: 121 MG/DL — HIGH
MAGNESIUM SERPL-MCNC: 1.8 MG/DL — SIGNIFICANT CHANGE UP (ref 1.6–2.6)
MCHC RBC-ENTMCNC: 26.8 PG — LOW (ref 27–34)
MCHC RBC-ENTMCNC: 32.8 GM/DL — SIGNIFICANT CHANGE UP (ref 32–36)
MCV RBC AUTO: 81.6 FL — SIGNIFICANT CHANGE UP (ref 80–100)
NON HDL CHOLESTEROL: 168 MG/DL — HIGH
NRBC # BLD: 0 /100 WBCS — SIGNIFICANT CHANGE UP (ref 0–0)
NT-PROBNP SERPL-SCNC: 553 PG/ML — HIGH (ref 0–300)
PHOSPHATE SERPL-MCNC: 2.9 MG/DL — SIGNIFICANT CHANGE UP (ref 2.5–4.5)
PLATELET # BLD AUTO: 187 K/UL — SIGNIFICANT CHANGE UP (ref 150–400)
POTASSIUM SERPL-MCNC: 4 MMOL/L — SIGNIFICANT CHANGE UP (ref 3.5–5.3)
POTASSIUM SERPL-SCNC: 4 MMOL/L — SIGNIFICANT CHANGE UP (ref 3.5–5.3)
PROT SERPL-MCNC: 7 G/DL — SIGNIFICANT CHANGE UP (ref 6–8.3)
RBC # BLD: 5.6 M/UL — SIGNIFICANT CHANGE UP (ref 4.2–5.8)
RBC # FLD: 14.5 % — SIGNIFICANT CHANGE UP (ref 10.3–14.5)
SODIUM SERPL-SCNC: 136 MMOL/L — SIGNIFICANT CHANGE UP (ref 135–145)
TRIGL SERPL-MCNC: 236 MG/DL — HIGH
TROPONIN T, HIGH SENSITIVITY RESULT: 6676 NG/L — HIGH (ref 0–51)
WBC # BLD: 14.93 K/UL — HIGH (ref 3.8–10.5)
WBC # FLD AUTO: 14.93 K/UL — HIGH (ref 3.8–10.5)

## 2021-12-23 PROCEDURE — 93571 IV DOP VEL&/PRESS C FLO 1ST: CPT | Mod: 26,RC

## 2021-12-23 PROCEDURE — 99152 MOD SED SAME PHYS/QHP 5/>YRS: CPT

## 2021-12-23 PROCEDURE — 99223 1ST HOSP IP/OBS HIGH 75: CPT

## 2021-12-23 PROCEDURE — 99291 CRITICAL CARE FIRST HOUR: CPT

## 2021-12-23 PROCEDURE — 93454 CORONARY ARTERY ANGIO S&I: CPT | Mod: 26

## 2021-12-23 RX ORDER — INSULIN GLARGINE 100 [IU]/ML
12 INJECTION, SOLUTION SUBCUTANEOUS AT BEDTIME
Refills: 0 | Status: DISCONTINUED | OUTPATIENT
Start: 2021-12-23 | End: 2021-12-24

## 2021-12-23 RX ORDER — INSULIN LISPRO 100/ML
4 VIAL (ML) SUBCUTANEOUS
Refills: 0 | Status: DISCONTINUED | OUTPATIENT
Start: 2021-12-23 | End: 2021-12-24

## 2021-12-23 RX ORDER — MAGNESIUM SULFATE 500 MG/ML
2 VIAL (ML) INJECTION ONCE
Refills: 0 | Status: COMPLETED | OUTPATIENT
Start: 2021-12-23 | End: 2021-12-23

## 2021-12-23 RX ADMIN — HEPARIN SODIUM 5000 UNIT(S): 5000 INJECTION INTRAVENOUS; SUBCUTANEOUS at 06:39

## 2021-12-23 RX ADMIN — Medication 81 MILLIGRAM(S): at 12:19

## 2021-12-23 RX ADMIN — Medication 25 GRAM(S): at 06:39

## 2021-12-23 RX ADMIN — Medication 2: at 09:01

## 2021-12-23 RX ADMIN — Medication 4 UNIT(S): at 17:47

## 2021-12-23 RX ADMIN — TICAGRELOR 90 MILLIGRAM(S): 90 TABLET ORAL at 06:39

## 2021-12-23 RX ADMIN — Medication 2: at 17:46

## 2021-12-23 RX ADMIN — Medication 4 UNIT(S): at 12:18

## 2021-12-23 RX ADMIN — Medication 4 UNIT(S): at 09:01

## 2021-12-23 RX ADMIN — Medication 12.5 MILLIGRAM(S): at 06:39

## 2021-12-23 RX ADMIN — CHLORHEXIDINE GLUCONATE 1 APPLICATION(S): 213 SOLUTION TOPICAL at 08:04

## 2021-12-23 RX ADMIN — TICAGRELOR 90 MILLIGRAM(S): 90 TABLET ORAL at 17:48

## 2021-12-23 RX ADMIN — HEPARIN SODIUM 5000 UNIT(S): 5000 INJECTION INTRAVENOUS; SUBCUTANEOUS at 21:03

## 2021-12-23 RX ADMIN — INSULIN GLARGINE 12 UNIT(S): 100 INJECTION, SOLUTION SUBCUTANEOUS at 21:51

## 2021-12-23 RX ADMIN — ATORVASTATIN CALCIUM 80 MILLIGRAM(S): 80 TABLET, FILM COATED ORAL at 21:03

## 2021-12-23 RX ADMIN — Medication 3: at 12:18

## 2021-12-23 NOTE — CHART NOTE - NSCHARTNOTEFT_GEN_A_CORE
MARISSA MACHADO  MRN-[id number]      Accepting Hospitalist: Dr Reyes  Signout given to: Dr Reyes  Patient transferring to floor:     ==========  HPI:  55yo M with PMH DM, HTN, HLD presenting as transfer from [place] for STEMI. Pt reports this morning he was taking a holistic medication when he started having 10/10 chest pain the radiated to his L arm. Also reports having nausea, SOB, and diaphoresis when chest pain occurred. Pt denies having any episodes of chest pain in the past. EKG at [place] showed STEMI affecting anterior wall and septum. Pt went for cath and had DESx1 placed in pLAD. Cath also showed pRCA 70% and diag 70%; plan for staged PCI Thursday.    Currently pt denies chest pain or SOB.     Pt endorsed selectively taking medications prescribed to him; only consistently takes Humulin every day.  (22 Dec 2021 13:15)    CICU Course: Pt admitted to CICU s/p [facility]. Has remained hemodynamically stable with downtrending cardiac enzymes.      =============================  Vital Signs Last 24 Hrs  T(C): 37.1 (22 Dec 2021 19:00), Max: 37.1 (22 Dec 2021 19:00)  T(F): 98.8 (22 Dec 2021 19:00), Max: 98.8 (22 Dec 2021 19:00)  HR: 78 (23 Dec 2021 03:00) (72 - 92)  BP: 113/63 (23 Dec 2021 03:00) (108/60 - 139/81)  BP(mean): 83 (23 Dec 2021 03:00) (78 - 102)  RR: 16 (23 Dec 2021 03:00) (13 - 27)  SpO2: 99% (23 Dec 2021 03:00) (96% - 99%)  ICU Vital Signs Last 24 Hrs  T(C): 37.1 (22 Dec 2021 19:00), Max: 37.1 (22 Dec 2021 19:00)  T(F): 98.8 (22 Dec 2021 19:00), Max: 98.8 (22 Dec 2021 19:00)  HR: 78 (23 Dec 2021 03:00) (72 - 92)  BP: 113/63 (23 Dec 2021 03:00) (108/60 - 139/81)  BP(mean): 83 (23 Dec 2021 03:00) (78 - 102)  RR: 16 (23 Dec 2021 03:00) (13 - 27)  SpO2: 99% (23 Dec 2021 03:00) (96% - 99%)    ==============================      ============================================  MEDICATIONS  (STANDING):  aspirin  chewable 81 milliGRAM(s) Oral daily  atorvastatin 80 milliGRAM(s) Oral at bedtime  chlorhexidine 4% Liquid 1 Application(s) Topical <User Schedule>  heparin   Injectable 5000 Unit(s) SubCutaneous every 8 hours  influenza   Vaccine 0.5 milliLiter(s) IntraMuscular once  insulin glargine Injectable (LANTUS) 12 Unit(s) SubCutaneous at bedtime  insulin lispro (ADMELOG) corrective regimen sliding scale   SubCutaneous at bedtime  insulin lispro (ADMELOG) corrective regimen sliding scale   SubCutaneous three times a day before meals  insulin lispro Injectable (ADMELOG) 4 Unit(s) SubCutaneous three times a day before meals  magnesium sulfate  IVPB 2 Gram(s) IV Intermittent once  metoprolol tartrate 12.5 milliGRAM(s) Oral every 12 hours  ticagrelor 90 milliGRAM(s) Oral every 12 hours    MEDICATIONS  (PRN):    ============================================    ====================ASSESSMENT ==============  55yo M with PMH DM, HTN, HLD presenting as transfer from [place] for STEMI s/p DESx1 to pLAD    Plan:  ====================== NEUROLOGY=====================  No active issues  - AAOx4     ==================== RESPIRATORY======================  No active issues  - SpO2> 94% on RA    ====================CARDIOVASCULAR==================  STEMI   - ST elevations in V2/V3/V4 now s/p DESx1 pLAD  - Continue to trend cardiac enzymes  - Brillinta and ASA for DAPT  - continue to monitor on telemetry  - Continue lopressor 12.5mg BID, Lipitor 80 QD  - TTE 12/22: Moderate-severe segmental left ventricular systolic dysfunction. No left ventricular thrombus. The mid anterior wall, and the mid inferoseptum are hypokinetic. The apical inferior wall, the apical anterior wall, the apical septum,  and the apical lateral wall are akinetic. Mild diastolic dysfunction (Stage I).  - plan for staged PCI Thursday    HTN, chronic  - Pt reports being prescribed anaprilin but does not take it  - hold ACEi/ARB until Cr improves      ===================HEMATOLOGIC/ONC ===================  No active issues  - H & H stable  - Continue to monitor    DVT Prophylaxis  - Continue SubQ heparin      ===================== RENAL =========================  MISTI  - Unknown baseline Cr  - Currently 1.33  - Likely 2/2 hypoperfusion   - Continue to trend Cr   - monitor urine output       ==================== GASTROINTESTINAL===================  No active issues  - tolerating DASH diet    =======================    ENDOCRINE  =====================  DM  - history of DM, reports taking Humulin 20u BID at home; was also prescribed metformin but does not take it   - f/u A1c   - Lantus 12 u at bedtime, pre meal 4 units  - Continue ISS  - Monitor finger sticks     ========================INFECTIOUS DISEASE================  Leukocytosis  - likely reactive in setting of MI, no fevers in past 24 hrs  - continue to monitor fever curve/leukocytosis   - monitor off abx for now      ==========  FOLLOW UP:  ==========  - Staged PCI thursday  - Continue ASA, Brillinta, Lopressor and Lipitor  - Consider ACE/ARB if Cr improves     Bronwyn Roa PA-C MARISSA MACHADO  MRN-[id number]      Accepting Hospitalist: Dr Reyes  Signout given to: Dr Reyes  Patient transferring to floor:     ==========  HPI:  55yo M with PMH DM, HTN, HLD presenting as transfer from [place] for STEMI. Pt reports this morning he was taking a holistic medication when he started having 10/10 chest pain the radiated to his L arm. Also reports having nausea, SOB, and diaphoresis when chest pain occurred. Pt denies having any episodes of chest pain in the past. EKG at [place] showed STEMI affecting anterior wall and septum. Pt went for cath and had DESx1 placed in pLAD. Cath also showed pRCA 70% and diag 70%; plan for staged PCI Thursday.    Currently pt denies chest pain or SOB.     Pt endorsed selectively taking medications prescribed to him; only consistently takes Humulin every day.  (22 Dec 2021 13:15)    CICU Course: Pt admitted to CICU s/p [facility]. Has remained hemodynamically stable with downtrending cardiac enzymes.      =============================  Vital Signs Last 24 Hrs  T(C): 37.1 (22 Dec 2021 19:00), Max: 37.1 (22 Dec 2021 19:00)  T(F): 98.8 (22 Dec 2021 19:00), Max: 98.8 (22 Dec 2021 19:00)  HR: 78 (23 Dec 2021 03:00) (72 - 92)  BP: 113/63 (23 Dec 2021 03:00) (108/60 - 139/81)  BP(mean): 83 (23 Dec 2021 03:00) (78 - 102)  RR: 16 (23 Dec 2021 03:00) (13 - 27)  SpO2: 99% (23 Dec 2021 03:00) (96% - 99%)  ICU Vital Signs Last 24 Hrs  T(C): 37.1 (22 Dec 2021 19:00), Max: 37.1 (22 Dec 2021 19:00)  T(F): 98.8 (22 Dec 2021 19:00), Max: 98.8 (22 Dec 2021 19:00)  HR: 78 (23 Dec 2021 03:00) (72 - 92)  BP: 113/63 (23 Dec 2021 03:00) (108/60 - 139/81)  BP(mean): 83 (23 Dec 2021 03:00) (78 - 102)  RR: 16 (23 Dec 2021 03:00) (13 - 27)  SpO2: 99% (23 Dec 2021 03:00) (96% - 99%)    ==============================      ============================================  MEDICATIONS  (STANDING):  aspirin  chewable 81 milliGRAM(s) Oral daily  atorvastatin 80 milliGRAM(s) Oral at bedtime  chlorhexidine 4% Liquid 1 Application(s) Topical <User Schedule>  heparin   Injectable 5000 Unit(s) SubCutaneous every 8 hours  influenza   Vaccine 0.5 milliLiter(s) IntraMuscular once  insulin glargine Injectable (LANTUS) 12 Unit(s) SubCutaneous at bedtime  insulin lispro (ADMELOG) corrective regimen sliding scale   SubCutaneous at bedtime  insulin lispro (ADMELOG) corrective regimen sliding scale   SubCutaneous three times a day before meals  insulin lispro Injectable (ADMELOG) 4 Unit(s) SubCutaneous three times a day before meals  magnesium sulfate  IVPB 2 Gram(s) IV Intermittent once  metoprolol tartrate 12.5 milliGRAM(s) Oral every 12 hours  ticagrelor 90 milliGRAM(s) Oral every 12 hours    MEDICATIONS  (PRN):    ============================================    ====================ASSESSMENT ==============  55yo M with PMH DM, HTN, HLD presenting as transfer from [place] for STEMI s/p DESx1 to pLAD    Plan:  ====================== NEUROLOGY=====================  No active issues  - AAOx4     ==================== RESPIRATORY======================  No active issues  - SpO2> 94% on RA    ====================CARDIOVASCULAR==================  STEMI   - ST elevations in V2/V3/V4 now s/p DESx1 pLAD  - Continue to trend cardiac enzymes  - Brillinta and ASA for DAPT  - continue to monitor on telemetry  - Continue lopressor 12.5mg BID, Lipitor 80 QD  - TTE 12/22: Moderate-severe segmental left ventricular systolic dysfunction. No left ventricular thrombus. The mid anterior wall, and the mid inferoseptum are hypokinetic. The apical inferior wall, the apical anterior wall, the apical septum,  and the apical lateral wall are akinetic. Mild diastolic dysfunction (Stage I).  - plan for staged PCI Thursday    HTN, chronic  - Pt reports being prescribed anaprilin but does not take it  - hold ACEi/ARB until Cr improves      ===================HEMATOLOGIC/ONC ===================  No active issues  - H & H stable  - Continue to monitor    DVT Prophylaxis  - Continue SubQ heparin      ===================== RENAL =========================  MISTI  - Unknown baseline Cr  - Currently 1.33  - Likely 2/2 hypoperfusion   - Continue to trend Cr   - monitor urine output       ==================== GASTROINTESTINAL===================  No active issues  - tolerating DASH diet    =======================    ENDOCRINE  =====================  DM  - history of DM, reports taking Humulin 20u BID at home; was also prescribed metformin but does not take it   - f/u A1c   - Lantus 12 u at bedtime, pre meal 4 units  - Continue ISS  - Monitor finger sticks     ========================INFECTIOUS DISEASE================  Leukocytosis  - likely reactive in setting of MI, no fevers in past 24 hrs  - continue to monitor fever curve/leukocytosis   - monitor off abx for now      ==========  FOLLOW UP:  ==========  - Continue ASA, Brillinta, Lopressor and Lipitor  - Consider ACE/ARB if Cr improves     Bronwyn Roa PA-C

## 2021-12-23 NOTE — PROGRESS NOTE ADULT - CRITICAL CARE SERVICES PROVIDED
Critical care services provided
Critical care services provided
Spiral Flap Text: The defect edges were debeveled with a #15 scalpel blade.  Given the location of the defect, shape of the defect and the proximity to free margins a spiral flap was deemed most appropriate.  Using a sterile surgical marker, an appropriate rotation flap was drawn incorporating the defect and placing the expected incisions within the relaxed skin tension lines where possible. The area thus outlined was incised deep to adipose tissue with a #15 scalpel blade.  The skin margins were undermined to an appropriate distance in all directions utilizing iris scissors.

## 2021-12-23 NOTE — PROGRESS NOTE ADULT - SUBJECTIVE AND OBJECTIVE BOX
Hillary Castrejon, PGY1  CCU Service  Internal Medicine  Pager: [phone] (NS)    PATIENT: MARISSA MACHADO, MRN: [id number]    CHIEF COMPLAINT: Patient is a 56y old  Male who presents with a chief complaint of STEMI (22 Dec 2021 23:24)      INTERVAL HISTORY/OVERNIGHT EVENTS: No overnight events. At bedside, patient has been sleeping and eating well. Denies N/V/D/C. Denies abdominal pain. Denies chest pain or SOB, cough. Oriented to person, place, and time. Breathing comfortably on room air.    REVIEW OF SYSTEMS:    Constitutional:     [ ] negative [ ] fevers [ ] chills [ ] weight loss [ ] weight gain  HEENT:                  [ ] negative [ ] dry eyes [ ] eye irritation [ ] postnasal drip [ ] nasal congestion  CV:                         [ ] negative  [ ] chest pain [ ] orthopnea [ ] palpitations [ ] murmur  Resp:                     [ ] negative [ ] cough [ ] shortness of breath [ ] dyspnea [ ] wheezing [ ] sputum [ ] hemoptysis  GI:                          [ ] negative [ ] nausea [ ] vomiting [ ] diarrhea [ ] constipation [ ] abd pain [ ] dysphagia   :                        [ ] negative [ ] dysuria [ ] nocturia [ ] hematuria [ ] increased urinary frequency  Musculoskeletal: [ ] negative [ ] back pain [ ] myalgias [ ] arthralgias [ ] fracture  Skin:                       [ ] negative [ ] rash [ ] itch  Neurological:        [ ] negative [ ] headache [ ] dizziness [ ] syncope [ ] weakness [ ] numbness  Psychiatric:           [ ] negative [ ] anxiety [ ] depression  Endocrine:            [ ] negative [ ] diabetes [ ] thyroid problem  Heme/Lymph:      [ ] negative [ ] anemia [ ] bleeding problem  Allergic/Immune: [ ] negative [ ] itchy eyes [ ] nasal discharge [ ] hives [ ] angioedema    [x] All other systems negative  [ ] Unable to assess ROS because ________.    MEDICATIONS:  MEDICATIONS  (STANDING):  aspirin  chewable 81 milliGRAM(s) Oral daily  atorvastatin 80 milliGRAM(s) Oral at bedtime  chlorhexidine 4% Liquid 1 Application(s) Topical <User Schedule>  heparin   Injectable 5000 Unit(s) SubCutaneous every 8 hours  influenza   Vaccine 0.5 milliLiter(s) IntraMuscular once  insulin glargine Injectable (LANTUS) 12 Unit(s) SubCutaneous at bedtime  insulin lispro (ADMELOG) corrective regimen sliding scale   SubCutaneous at bedtime  insulin lispro (ADMELOG) corrective regimen sliding scale   SubCutaneous three times a day before meals  insulin lispro Injectable (ADMELOG) 4 Unit(s) SubCutaneous three times a day before meals  metoprolol tartrate 12.5 milliGRAM(s) Oral every 12 hours  ticagrelor 90 milliGRAM(s) Oral every 12 hours    MEDICATIONS  (PRN):      ALLERGIES: Allergies    No Known Allergies    Intolerances        OBJECTIVE:  ICU Vital Signs Last 24 Hrs  T(C): 36.7 (23 Dec 2021 03:00), Max: 37.1 (22 Dec 2021 19:00)  T(F): 98.1 (23 Dec 2021 03:00), Max: 98.8 (22 Dec 2021 19:00)  HR: 77 (23 Dec 2021 07:00) (72 - 92)  BP: 110/66 (23 Dec 2021 07:00) (108/60 - 139/81)  BP(mean): 82 (23 Dec 2021 07:00) (78 - 102)  ABP: --  ABP(mean): --  RR: 17 (23 Dec 2021 07:00) (13 - 27)  SpO2: 96% (23 Dec 2021 07:00) (96% - 99%)      Adult Advanced Hemodynamics Last 24 Hrs  CVP(mm Hg): --  CVP(cm H2O): --  CO: --  CI: --  PA: --  PA(mean): --  PCWP: --  SVR: --  SVRI: --  PVR: --  PVRI: --  CAPILLARY BLOOD GLUCOSE      POCT Blood Glucose.: 249 mg/dL (22 Dec 2021 23:09)  POCT Blood Glucose.: 220 mg/dL (22 Dec 2021 17:25)  POCT Blood Glucose.: 185 mg/dL (22 Dec 2021 12:35)    CAPILLARY BLOOD GLUCOSE      POCT Blood Glucose.: 249 mg/dL (22 Dec 2021 23:09)    I&O's Summary    22 Dec 2021 07:01  -  23 Dec 2021 07:00  --------------------------------------------------------  IN: 480 mL / OUT: 2125 mL / NET: -1645 mL      Daily Height in cm: 180.34 (22 Dec 2021 10:36)    Daily Weight in k.5 (23 Dec 2021 06:00)    PHYSICAL EXAMINATION:  General: Comfortable, no acute distress, cooperative with exam.  HEENT: PERRLA, EOMI, moist mucous membranes.  Respiratory: CTAB, normal respiratory effort, no coughing, wheezes, crackles, or rales.  CV: RRR, S1S2, no murmurs, rubs or gallops. No JVD. Distal pulses intact.  Abdominal: Soft, nontender, nondistended, no rebound or guarding, normal bowel sounds.  Neurology: AOx3, no focal neuro defects, MATHEW x 4.  Extremities: No pitting edema, + Peripheral pulses.      LABS:                          15.0   14.93 )-----------( 187      ( 23 Dec 2021 01:02 )             45.7         136  |  103  |  14  ----------------------------<  258<H>  4.0   |  19<L>  |  1.15    Ca    9.2      23 Dec 2021 01:02  Phos  2.9       Mg     1.8         TPro  7.0  /  Alb  4.1  /  TBili  0.4  /  DBili  x   /  AST  167<H>  /  ALT  48<H>  /  AlkPhos  100      LIVER FUNCTIONS - ( 23 Dec 2021 01:02 )  Alb: 4.1 g/dL / Pro: 7.0 g/dL / ALK PHOS: 100 U/L / ALT: 48 U/L / AST: 167 U/L / GGT: x           PT/INR - ( 22 Dec 2021 08:35 )   PT: 13.9 sec;   INR: 1.18 ratio         PTT - ( 22 Dec 2021 08:35 )  PTT:41.3 sec  CKMB Units: 71.6 ng/mL ( @ 01:02)  Creatine Kinase, Serum: 1456 U/L ( @ 01:02)  CKMB Units: 129.5 ng/mL ( @ 16:19)  Creatine Kinase, Serum: 2306 U/L ( @ 16:19)    CARDIAC MARKERS ( 23 Dec 2021 01:02 )  x     / x     / 1456 U/L / x     / 71.6 ng/mL  CARDIAC MARKERS ( 22 Dec 2021 16:19 )  x     / x     / 2306 U/L / x     / 129.5 ng/mL          TELEMETRY:     EKG:     IMAGING:

## 2021-12-23 NOTE — PROGRESS NOTE ADULT - ASSESSMENT
56 year old Male w/PMH DM II, HTN, HLD  admitted with NSTEMI s/p cath w/ ANA to plad , now downgraded from CCU

## 2021-12-23 NOTE — CHART NOTE - NSCHARTNOTEFT_GEN_A_CORE
MAR Accept Note  Transfer to:  Medicine   Accepting Attending Physician:  Dr. Coto   Assigned Room:  [address] [place] [address]    Patient seen and examined.   Labs and data reviewed.   No findings precluding transfer of service.       HPI/ ICU COURSE:   Please refer to ICU transfer note for full details. Briefly, this is a 55yo M with PMH DM, HTN, HLD presenting as transfer from [place] for STEMI. Reported chest pain on admission w/ radiation to the L arm. Also reported having nausea, SOB, and diaphoresis when chest pain occurred.  EKG at [place] showed STEMI affecting anterior wall and septum. Pt now s/p cath w/ DESx1 placed in pLAD. Cath also showed pRCA 70% and diag 70%; plan for staged PCI 12/23. To be transferred to cath recovery and then to floors post procedure.         FOR FOLLOW-UP:  - Continue ASA, Brillinta, Lopressor and Lipitor  - Consider ACE/ARB if Cr improves       Tamara Roldan MD  PGY-3, Internal Medicine  MAR Spectra: [phone]

## 2021-12-23 NOTE — PROGRESS NOTE ADULT - SUBJECTIVE AND OBJECTIVE BOX
Medicine Transfer Accept note:   HPI: Patient is a 56 year old Male w/PMH DM II, HTN, HLD  presented with chest pain  to [place] on 12/22/21 , transferred to [facility] for concern of STEMI. Patient reported 10/10 chest pain the radiated to his L arm, a/w nausea, SOB, and diaphoresis . EKG at [place] showed STEMI affecting anterior wall and septum.  Patient underwent cardiac cath and [facility]  He was also noted to have pRCA 70% and diag 70%    CICU Course: Pt admitted to CICU Post cath. Has remained hemodynamically stable with downtrending cardiac enzymes. Patient underwent staged PCI on12/23   Patient currently reports   ROS:    Allergies: No Known Allergies  MEDICATIONS  (STANDING):  aspirin  chewable 81 milliGRAM(s) Oral daily  atorvastatin 80 milliGRAM(s) Oral at bedtime  chlorhexidine 4% Liquid 1 Application(s) Topical <User Schedule>  heparin   Injectable 5000 Unit(s) SubCutaneous every 8 hours  influenza   Vaccine 0.5 milliLiter(s) IntraMuscular once  insulin glargine Injectable (LANTUS) 12 Unit(s) SubCutaneous at bedtime  insulin lispro (ADMELOG) corrective regimen sliding scale   SubCutaneous at bedtime  insulin lispro (ADMELOG) corrective regimen sliding scale   SubCutaneous three times a day before meals  insulin lispro Injectable (ADMELOG) 4 Unit(s) SubCutaneous three times a day before meals  ticagrelor 90 milliGRAM(s) Oral every 12 hours    MEDICATIONS  (PRN):    Home Medications:  HumuLIN 70/30 subcutaneous suspension: 20 unit(s) subcutaneous 2 times a day (22 Dec 2021 13:47)    PAST MEDICAL & SURGICAL HISTORY:  Diabetes  Hypertension  Hyperlipidemia  No pertinent past surgical history    FAMILY HISTORY:  FH: diabetes mellitus    Social history:     EXAM:  Vital Signs Last 24 Hrs  T(C): 36.7 (23 Dec 2021 17:30), Max: 37 (22 Dec 2021 23:00)  T(F): 98.1 (23 Dec 2021 17:30), Max: 98.6 (22 Dec 2021 23:00)  HR: 83 (23 Dec 2021 17:30) (69 - 92)  BP: 125/80 (23 Dec 2021 17:30) (108/60 - 135/70)  BP(mean): 88 (23 Dec 2021 12:00) (78 - 94)  RR: 18 (23 Dec 2021 17:30) (15 - 25)  SpO2: 98% (23 Dec 2021 17:30) (96% - 99%)    Labs personally reviewed:                          15.0   14.93 )-----------( 187      ( 23 Dec 2021 01:02 )             45.7     12-23    136  |  103  |  14  ----------------------------<  258<H>  4.0   |  19<L>  |  1.15    Ca    9.2      23 Dec 2021 01:02  Phos  2.9     12-23  Mg     1.8     12-23    TPro  7.0  /  Alb  4.1  /  TBili  0.4  /  DBili  x   /  AST  167<H>  /  ALT  48<H>  /  AlkPhos  100  12-23    CARDIAC MARKERS ( 23 Dec 2021 01:02 )  x     / x     / 1456 U/L / x     / 71.6 ng/mL  CARDIAC MARKERS ( 22 Dec 2021 16:19 )  x     / x     / 2306 U/L / x     / 129.5 ng/mL      LIVER FUNCTIONS - ( 23 Dec 2021 01:02 )  Alb: 4.1 g/dL / Pro: 7.0 g/dL / ALK PHOS: 100 U/L / ALT: 48 U/L / AST: 167 U/L / GGT: x           PT/INR - ( 22 Dec 2021 08:35 )   PT: 13.9 sec;   INR: 1.18 ratio         PTT - ( 22 Dec 2021 08:35 )  PTT:41.3 sec    CAPILLARY BLOOD GLUCOSE      POCT Blood Glucose.: 226 mg/dL (23 Dec 2021 17:30)  POCT Blood Glucose.: 255 mg/dL (23 Dec 2021 12:15)  POCT Blood Glucose.: 203 mg/dL (23 Dec 2021 08:03)  POCT Blood Glucose.: 249 mg/dL (22 Dec 2021 23:09)      Imaging:  CXR personally reviewed:  No focal lesions     EKG personally reviewed:  Echocardiogram: Moderate-severe segmental left ventricular systolic dysfunction. No left ventricular thrombus. The mid anterior wall, and the mid inferoseptum are hypokinetic. The apical inferior wall, the apical anterior wall, the apical septum, and the apical lateral wall are akinetic. Mild diastolic dysfunction (Stage I)           Medicine Transfer Accept note:   HPI: Patient is a 56 year old Male w/PMH DM II, HTN, HLD  presented with chest pain  to [place] on 12/22/21 , transferred to [facility] for concern of STEMI. Patient reported 10/10 chest pain the radiated to his L arm, a/w nausea, SOB, and diaphoresis . EKG at [place] showed STEMI affecting anterior wall and septum.  Patient underwent cardiac cath and [facility]  He was also noted to have pRCA 70% and diag 70%    CICU Course: Pt admitted to CICU Post cath. Has remained hemodynamically stable with downtrending cardiac enzymes. Patient underwent staged PCI on12/23   Patient currently reports no chest pain , no sob , he is ambulating , feels well. no fever or chills , no cough.   ROS:  CONSTITUTIONAL: No weakness, fevers or chills  EYES/ENT: No visual changes;  No dysphagia  NECK: No pain or stiffness  RESPIRATORY: No cough, wheezing, hemoptysis; No shortness of breath  CARDIOVASCULAR: No chest pain or palpitations; No lower extremity edema  EXTREMITIES: no le edema, cyanosis, clubbing  GASTROINTESTINAL: No abdominal or epigastric pain. No nausea, vomiting, or hematemesis; No diarrhea or constipation. No melena or hematochezia.  BACK: No back pain  GENITOURINARY: No dysuria, frequency or hematuria  NEUROLOGICAL: No numbness or weakness  MSK: no joint swelling or pain  SKIN: No itching, burning, rashes, or lesions   PSYCH: no agitation  All other review of systems is negative unless indicated above.      Allergies: No Known Allergies  MEDICATIONS  (STANDING):  aspirin  chewable 81 milliGRAM(s) Oral daily  atorvastatin 80 milliGRAM(s) Oral at bedtime  chlorhexidine 4% Liquid 1 Application(s) Topical <User Schedule>  heparin   Injectable 5000 Unit(s) SubCutaneous every 8 hours  influenza   Vaccine 0.5 milliLiter(s) IntraMuscular once  insulin glargine Injectable (LANTUS) 12 Unit(s) SubCutaneous at bedtime  insulin lispro (ADMELOG) corrective regimen sliding scale   SubCutaneous at bedtime  insulin lispro (ADMELOG) corrective regimen sliding scale   SubCutaneous three times a day before meals  insulin lispro Injectable (ADMELOG) 4 Unit(s) SubCutaneous three times a day before meals  ticagrelor 90 milliGRAM(s) Oral every 12 hours    MEDICATIONS  (PRN):    Home Medications:  HumuLIN 70/30 subcutaneous suspension: 20 unit(s) subcutaneous 2 times a day (22 Dec 2021 13:47)    PAST MEDICAL & SURGICAL HISTORY:  Diabetes  Hypertension  Hyperlipidemia  No pertinent past surgical history    FAMILY HISTORY:  FH: diabetes mellitus    Social history: smokes marijuana , no cigarettes , no etoh use ,  lives with wife      EXAM:  Vital Signs Last 24 Hrs  T(C): 36.7 (23 Dec 2021 17:30), Max: 37 (22 Dec 2021 23:00)  T(F): 98.1 (23 Dec 2021 17:30), Max: 98.6 (22 Dec 2021 23:00)  HR: 83 (23 Dec 2021 17:30) (69 - 92)  BP: 125/80 (23 Dec 2021 17:30) (108/60 - 135/70)  BP(mean): 88 (23 Dec 2021 12:00) (78 - 94)  RR: 18 (23 Dec 2021 17:30) (15 - 25)  SpO2: 98% (23 Dec 2021 17:30) (96% - 99%)    GENERAL: No acute distress, well-developed  HEAD:  Atraumatic, Normocephalic  ENT: EOMI, PERRLA, conjunctiva and sclera clear,  moist mucosa no pharyngeal erythema or exudates   NECK: supple , no JVD   CHEST/LUNG: Clear to auscultation bilaterally; No wheeze, equal breath sounds bilaterally   BACK: No spinal tenderness,  No CVA tenderness   HEART: Regular rate and rhythm; No murmurs, rubs, or gallops  ABDOMEN: Soft, Nontender, Nondistended; Bowel sounds present  EXTREMITIES:  No clubbing, cyanosis, or edema  MSK: No joint swelling or effusions, ROM intact   PSYCH: Normal behavior/affect  NEUROLOGY: AAOx3, non-focal, cranial nerves intact  SKIN: Normal color, No rashes or lesions    Labs personally reviewed:                          15.0   14.93 )-----------( 187      ( 23 Dec 2021 01:02 )             45.7     12-23    136  |  103  |  14  ----------------------------<  258<H>  4.0   |  19<L>  |  1.15    Ca    9.2      23 Dec 2021 01:02  Phos  2.9     12-23  Mg     1.8     12-23    TPro  7.0  /  Alb  4.1  /  TBili  0.4  /  DBili  x   /  AST  167<H>  /  ALT  48<H>  /  AlkPhos  100  12-23    CARDIAC MARKERS ( 23 Dec 2021 01:02 )  x     / x     / 1456 U/L / x     / 71.6 ng/mL  CARDIAC MARKERS ( 22 Dec 2021 16:19 )  x     / x     / 2306 U/L / x     / 129.5 ng/mL      LIVER FUNCTIONS - ( 23 Dec 2021 01:02 )  Alb: 4.1 g/dL / Pro: 7.0 g/dL / ALK PHOS: 100 U/L / ALT: 48 U/L / AST: 167 U/L / GGT: x           PT/INR - ( 22 Dec 2021 08:35 )   PT: 13.9 sec;   INR: 1.18 ratio         PTT - ( 22 Dec 2021 08:35 )  PTT:41.3 sec    CAPILLARY BLOOD GLUCOSE      POCT Blood Glucose.: 226 mg/dL (23 Dec 2021 17:30)  POCT Blood Glucose.: 255 mg/dL (23 Dec 2021 12:15)  POCT Blood Glucose.: 203 mg/dL (23 Dec 2021 08:03)  POCT Blood Glucose.: 249 mg/dL (22 Dec 2021 23:09)      Imaging:  CXR personally reviewed:  No focal lesions     EKG personally reviewed:  Echocardiogram: Moderate-severe segmental left ventricular systolic dysfunction. No left ventricular thrombus. The mid anterior wall, and the mid inferoseptum are hypokinetic. The apical inferior wall, the apical anterior wall, the apical septum, and the apical lateral wall are akinetic. Mild diastolic dysfunction (Stage I)

## 2021-12-23 NOTE — PROGRESS NOTE ADULT - ASSESSMENT
57yo M with PMH DM, HTN, HLD presenting as transfer from [place] for STEMI s/p DESx1 to pLAD.     #Neuro  - no active issues  - A/O x 3, no focal deficits on exam    #Pulmonary  - currently saturating well on RA  - no active issues    #Cardiovascular  STEMI   - ST elevations in V2/V3/V4 now s/p DESx1 pLAD  - Trop-T 4103, continue to trend cardiac markers   - c/w brilinta and aspirin  - continue to monitor on telemetry  - c/w lopressor 12.5mg BID  - TTE: Moderate-severe segmental left ventricular systolic dysfunction. No left ventricular thrombus. The mid anterior wall, and the mid inferoseptum are hypokinetic. The apical inferior wall, the apical anterior wall, the apical septum, and the apical lateral wall are akinetic. Mild diastolic dysfunction (Stage I)  - plan for staged PCI today    HTN  - history of hypertension, reports being prescribed anaprilin but does not take it  - hold ACEi/ARB until Cr normalizes     #GI  - no active issues    #Renal  - unknown baseline, Cr 1.15  - monitor Cr, BUN, urine output     #Endocrine  DM  - elevated blood sugars on CMP  - history of DM, reports taking Humulin 20u BID at home; was also prescribed metformin but does not take it   - A1c 9  - lantus 12u; lispro 4u with meals  - ISS, monitor BGs    #ID  - leukocytosis likely reactive, no fevers in past 24 hrs  - continue to monitor fever curve/leukocytosis     #Heme  - no active issues  - heparin subq

## 2021-12-24 ENCOUNTER — TRANSCRIPTION ENCOUNTER (OUTPATIENT)
Age: 56
End: 2021-12-24

## 2021-12-24 VITALS
SYSTOLIC BLOOD PRESSURE: 140 MMHG | RESPIRATION RATE: 18 BRPM | TEMPERATURE: 98 F | HEART RATE: 82 BPM | DIASTOLIC BLOOD PRESSURE: 86 MMHG | OXYGEN SATURATION: 98 %

## 2021-12-24 DIAGNOSIS — Z02.9 ENCOUNTER FOR ADMINISTRATIVE EXAMINATIONS, UNSPECIFIED: ICD-10-CM

## 2021-12-24 LAB
ALBUMIN SERPL ELPH-MCNC: 4.1 G/DL — SIGNIFICANT CHANGE UP (ref 3.3–5)
ALP SERPL-CCNC: 99 U/L — SIGNIFICANT CHANGE UP (ref 40–120)
ALT FLD-CCNC: 33 U/L — SIGNIFICANT CHANGE UP (ref 10–45)
ANION GAP SERPL CALC-SCNC: 13 MMOL/L — SIGNIFICANT CHANGE UP (ref 5–17)
ANION GAP SERPL CALC-SCNC: 14 MMOL/L — SIGNIFICANT CHANGE UP (ref 5–17)
AST SERPL-CCNC: 52 U/L — HIGH (ref 10–40)
BASOPHILS # BLD AUTO: 0.03 K/UL — SIGNIFICANT CHANGE UP (ref 0–0.2)
BASOPHILS NFR BLD AUTO: 0.2 % — SIGNIFICANT CHANGE UP (ref 0–2)
BILIRUB SERPL-MCNC: 0.6 MG/DL — SIGNIFICANT CHANGE UP (ref 0.2–1.2)
BUN SERPL-MCNC: 13 MG/DL — SIGNIFICANT CHANGE UP (ref 7–23)
BUN SERPL-MCNC: 16 MG/DL — SIGNIFICANT CHANGE UP (ref 7–23)
CALCIUM SERPL-MCNC: 8.7 MG/DL — SIGNIFICANT CHANGE UP (ref 8.4–10.5)
CALCIUM SERPL-MCNC: 9.3 MG/DL — SIGNIFICANT CHANGE UP (ref 8.4–10.5)
CHLORIDE SERPL-SCNC: 100 MMOL/L — SIGNIFICANT CHANGE UP (ref 96–108)
CHLORIDE SERPL-SCNC: 103 MMOL/L — SIGNIFICANT CHANGE UP (ref 96–108)
CO2 SERPL-SCNC: 22 MMOL/L — SIGNIFICANT CHANGE UP (ref 22–31)
CO2 SERPL-SCNC: 23 MMOL/L — SIGNIFICANT CHANGE UP (ref 22–31)
CREAT SERPL-MCNC: 1.09 MG/DL — SIGNIFICANT CHANGE UP (ref 0.5–1.3)
CREAT SERPL-MCNC: 1.26 MG/DL — SIGNIFICANT CHANGE UP (ref 0.5–1.3)
EOSINOPHIL # BLD AUTO: 0.18 K/UL — SIGNIFICANT CHANGE UP (ref 0–0.5)
EOSINOPHIL NFR BLD AUTO: 1.4 % — SIGNIFICANT CHANGE UP (ref 0–6)
GLUCOSE BLDC GLUCOMTR-MCNC: 208 MG/DL — HIGH (ref 70–99)
GLUCOSE BLDC GLUCOMTR-MCNC: 288 MG/DL — HIGH (ref 70–99)
GLUCOSE SERPL-MCNC: 218 MG/DL — HIGH (ref 70–99)
GLUCOSE SERPL-MCNC: 253 MG/DL — HIGH (ref 70–99)
HCT VFR BLD CALC: 45.2 % — SIGNIFICANT CHANGE UP (ref 39–50)
HGB BLD-MCNC: 15 G/DL — SIGNIFICANT CHANGE UP (ref 13–17)
IMM GRANULOCYTES NFR BLD AUTO: 0.5 % — SIGNIFICANT CHANGE UP (ref 0–1.5)
LYMPHOCYTES # BLD AUTO: 3.87 K/UL — HIGH (ref 1–3.3)
LYMPHOCYTES # BLD AUTO: 31.2 % — SIGNIFICANT CHANGE UP (ref 13–44)
MAGNESIUM SERPL-MCNC: 2.1 MG/DL — SIGNIFICANT CHANGE UP (ref 1.6–2.6)
MCHC RBC-ENTMCNC: 26.9 PG — LOW (ref 27–34)
MCHC RBC-ENTMCNC: 33.2 GM/DL — SIGNIFICANT CHANGE UP (ref 32–36)
MCV RBC AUTO: 81.1 FL — SIGNIFICANT CHANGE UP (ref 80–100)
MONOCYTES # BLD AUTO: 1.07 K/UL — HIGH (ref 0–0.9)
MONOCYTES NFR BLD AUTO: 8.6 % — SIGNIFICANT CHANGE UP (ref 2–14)
NEUTROPHILS # BLD AUTO: 7.21 K/UL — SIGNIFICANT CHANGE UP (ref 1.8–7.4)
NEUTROPHILS NFR BLD AUTO: 58.1 % — SIGNIFICANT CHANGE UP (ref 43–77)
NRBC # BLD: 0 /100 WBCS — SIGNIFICANT CHANGE UP (ref 0–0)
PHOSPHATE SERPL-MCNC: 2.6 MG/DL — SIGNIFICANT CHANGE UP (ref 2.5–4.5)
PLATELET # BLD AUTO: 210 K/UL — SIGNIFICANT CHANGE UP (ref 150–400)
POTASSIUM SERPL-MCNC: 3.6 MMOL/L — SIGNIFICANT CHANGE UP (ref 3.5–5.3)
POTASSIUM SERPL-MCNC: 5.4 MMOL/L — HIGH (ref 3.5–5.3)
POTASSIUM SERPL-SCNC: 3.6 MMOL/L — SIGNIFICANT CHANGE UP (ref 3.5–5.3)
POTASSIUM SERPL-SCNC: 5.4 MMOL/L — HIGH (ref 3.5–5.3)
PROT SERPL-MCNC: 7.1 G/DL — SIGNIFICANT CHANGE UP (ref 6–8.3)
RBC # BLD: 5.57 M/UL — SIGNIFICANT CHANGE UP (ref 4.2–5.8)
RBC # FLD: 14.5 % — SIGNIFICANT CHANGE UP (ref 10.3–14.5)
SODIUM SERPL-SCNC: 135 MMOL/L — SIGNIFICANT CHANGE UP (ref 135–145)
SODIUM SERPL-SCNC: 140 MMOL/L — SIGNIFICANT CHANGE UP (ref 135–145)
WBC # BLD: 12.42 K/UL — HIGH (ref 3.8–10.5)
WBC # FLD AUTO: 12.42 K/UL — HIGH (ref 3.8–10.5)

## 2021-12-24 PROCEDURE — 93571 IV DOP VEL&/PRESS C FLO 1ST: CPT | Mod: RC

## 2021-12-24 PROCEDURE — C9606: CPT | Mod: RC

## 2021-12-24 PROCEDURE — 83880 ASSAY OF NATRIURETIC PEPTIDE: CPT

## 2021-12-24 PROCEDURE — C1887: CPT

## 2021-12-24 PROCEDURE — 82962 GLUCOSE BLOOD TEST: CPT

## 2021-12-24 PROCEDURE — 36415 COLL VENOUS BLD VENIPUNCTURE: CPT

## 2021-12-24 PROCEDURE — 86901 BLOOD TYPING SEROLOGIC RH(D): CPT

## 2021-12-24 PROCEDURE — 99152 MOD SED SAME PHYS/QHP 5/>YRS: CPT

## 2021-12-24 PROCEDURE — C1874: CPT

## 2021-12-24 PROCEDURE — C1894: CPT

## 2021-12-24 PROCEDURE — 84100 ASSAY OF PHOSPHORUS: CPT

## 2021-12-24 PROCEDURE — C8924: CPT

## 2021-12-24 PROCEDURE — 80048 BASIC METABOLIC PNL TOTAL CA: CPT

## 2021-12-24 PROCEDURE — 93458 L HRT ARTERY/VENTRICLE ANGIO: CPT | Mod: 59

## 2021-12-24 PROCEDURE — 82550 ASSAY OF CK (CPK): CPT

## 2021-12-24 PROCEDURE — 83735 ASSAY OF MAGNESIUM: CPT

## 2021-12-24 PROCEDURE — 80061 LIPID PANEL: CPT

## 2021-12-24 PROCEDURE — C1725: CPT

## 2021-12-24 PROCEDURE — 99233 SBSQ HOSP IP/OBS HIGH 50: CPT

## 2021-12-24 PROCEDURE — 85025 COMPLETE CBC W/AUTO DIFF WBC: CPT

## 2021-12-24 PROCEDURE — 93321 DOPPLER ECHO F-UP/LMTD STD: CPT

## 2021-12-24 PROCEDURE — 93308 TTE F-UP OR LMTD: CPT | Mod: 26

## 2021-12-24 PROCEDURE — C1769: CPT

## 2021-12-24 PROCEDURE — 84484 ASSAY OF TROPONIN QUANT: CPT

## 2021-12-24 PROCEDURE — 86850 RBC ANTIBODY SCREEN: CPT

## 2021-12-24 PROCEDURE — 83036 HEMOGLOBIN GLYCOSYLATED A1C: CPT

## 2021-12-24 PROCEDURE — C8929: CPT

## 2021-12-24 PROCEDURE — 84443 ASSAY THYROID STIM HORMONE: CPT

## 2021-12-24 PROCEDURE — 86900 BLOOD TYPING SEROLOGIC ABO: CPT

## 2021-12-24 PROCEDURE — 82553 CREATINE MB FRACTION: CPT

## 2021-12-24 PROCEDURE — 93321 DOPPLER ECHO F-UP/LMTD STD: CPT | Mod: 26

## 2021-12-24 PROCEDURE — 99239 HOSP IP/OBS DSCHRG MGMT >30: CPT

## 2021-12-24 PROCEDURE — 85027 COMPLETE CBC AUTOMATED: CPT

## 2021-12-24 PROCEDURE — 80053 COMPREHEN METABOLIC PANEL: CPT

## 2021-12-24 PROCEDURE — 93005 ELECTROCARDIOGRAM TRACING: CPT

## 2021-12-24 PROCEDURE — 93454 CORONARY ARTERY ANGIO S&I: CPT

## 2021-12-24 RX ORDER — SACUBITRIL AND VALSARTAN 24; 26 MG/1; MG/1
1 TABLET, FILM COATED ORAL
Qty: 60 | Refills: 0
Start: 2021-12-24 | End: 2022-01-22

## 2021-12-24 RX ORDER — METOPROLOL TARTRATE 50 MG
25 TABLET ORAL DAILY
Refills: 0 | Status: DISCONTINUED | OUTPATIENT
Start: 2021-12-24 | End: 2021-12-24

## 2021-12-24 RX ORDER — ASPIRIN/CALCIUM CARB/MAGNESIUM 324 MG
1 TABLET ORAL
Qty: 30 | Refills: 0
Start: 2021-12-24 | End: 2022-01-22

## 2021-12-24 RX ORDER — TICAGRELOR 90 MG/1
1 TABLET ORAL
Qty: 60 | Refills: 0
Start: 2021-12-24 | End: 2022-01-22

## 2021-12-24 RX ORDER — METOPROLOL TARTRATE 50 MG
1 TABLET ORAL
Qty: 30 | Refills: 0
Start: 2021-12-24 | End: 2022-01-22

## 2021-12-24 RX ORDER — SODIUM ZIRCONIUM CYCLOSILICATE 10 G/10G
10 POWDER, FOR SUSPENSION ORAL ONCE
Refills: 0 | Status: COMPLETED | OUTPATIENT
Start: 2021-12-24 | End: 2021-12-24

## 2021-12-24 RX ORDER — LOSARTAN POTASSIUM 100 MG/1
25 TABLET, FILM COATED ORAL DAILY
Refills: 0 | Status: DISCONTINUED | OUTPATIENT
Start: 2021-12-24 | End: 2021-12-24

## 2021-12-24 RX ORDER — ATORVASTATIN CALCIUM 80 MG/1
1 TABLET, FILM COATED ORAL
Qty: 30 | Refills: 0
Start: 2021-12-24 | End: 2022-01-22

## 2021-12-24 RX ORDER — LOSARTAN POTASSIUM 100 MG/1
1 TABLET, FILM COATED ORAL
Qty: 30 | Refills: 0
Start: 2021-12-24 | End: 2022-01-22

## 2021-12-24 RX ADMIN — Medication 3: at 08:17

## 2021-12-24 RX ADMIN — Medication 4 UNIT(S): at 08:17

## 2021-12-24 RX ADMIN — SODIUM ZIRCONIUM CYCLOSILICATE 10 GRAM(S): 10 POWDER, FOR SUSPENSION ORAL at 08:51

## 2021-12-24 RX ADMIN — Medication 2: at 12:52

## 2021-12-24 RX ADMIN — TICAGRELOR 90 MILLIGRAM(S): 90 TABLET ORAL at 05:40

## 2021-12-24 RX ADMIN — CHLORHEXIDINE GLUCONATE 1 APPLICATION(S): 213 SOLUTION TOPICAL at 08:12

## 2021-12-24 RX ADMIN — Medication 4 UNIT(S): at 12:52

## 2021-12-24 RX ADMIN — HEPARIN SODIUM 5000 UNIT(S): 5000 INJECTION INTRAVENOUS; SUBCUTANEOUS at 05:37

## 2021-12-24 RX ADMIN — Medication 81 MILLIGRAM(S): at 12:51

## 2021-12-24 RX ADMIN — HEPARIN SODIUM 5000 UNIT(S): 5000 INJECTION INTRAVENOUS; SUBCUTANEOUS at 14:49

## 2021-12-24 NOTE — PROGRESS NOTE ADULT - PROBLEM SELECTOR PLAN 1
ST elevations in V2/V3/V4 now s/p DESx1 pLAD, s/p staged PCI 12/23/21: - TTE : Moderate-severe segmental left ventricular systolic dysfunction. No thrombus. The mid anterior wall, and the mid inferoseptum are hypokinetic. The apical inferior wall, the apical anterior wall, the apical septum, and the apical lateral wall are akinetic. Mild diastolic dysfunction (Stage I).  -  continue Brillinta and ASA for DAPT  -  monitor on telemetry  - Continue lopressor 12.5mg BID  - continue  Lipitor 80 QD  - Cardiology consult appreciated, day team to follow up further recommendations
ST elevations in V2/V3/V4 now s/p DESx1 pLAD, s/p staged PCI 12/23/21: - TTE : Moderate-severe segmental left ventricular systolic dysfunction. No thrombus. The mid anterior wall, and the mid inferoseptum are hypokinetic. The apical inferior wall, the apical anterior wall, the apical septum, and the apical lateral wall are akinetic. Mild diastolic dysfunction (Stage I).  -  continue Brillinta and ASA for DAPT  -  monitor on telemetry  - c/w toprol XL 25 daily   -started on losartan 25 daily   - continue  Lipitor 80 QD  -TTE obtained today; no evidence of LV thrombus   - patient to be discharged today; f/u w/ Cardiology, Dr. Kay in one week

## 2021-12-24 NOTE — DISCHARGE NOTE PROVIDER - NSDCCPCAREPLAN_GEN_ALL_CORE_FT
PRINCIPAL DISCHARGE DIAGNOSIS  Diagnosis: STEMI (ST elevation myocardial infarction)  Assessment and Plan of Treatment: Please continue with the medications as ordered.  Follow up with Cardiologist as an outpatient.      SECONDARY DISCHARGE DIAGNOSES  Diagnosis: Hypertension  Assessment and Plan of Treatment: Low salt diet  Activity as tolerated.  Take all medication as prescribed.  Follow up with your medical doctor for routine blood pressure monitoring at your next visit.  Notify your doctor if you have any of the following symptoms:   Dizziness, Lightheadedness, Blurry vision, Headache, Chest pain, Shortness of breath      Diagnosis: Diabetes  Assessment and Plan of Treatment: HgA1C this admission 9.1.  Make sure you get your HgA1c checked every three months.  If you take oral diabetes medications, check your blood glucose two times a day.  If you take insulin, check your blood glucose before meals and at bedtime.  It's important not to skip any meals.  Keep a log of your blood glucose results and always take it with you to your doctor appointments.  Keep a list of your current medications including injectables and over the counter medications and bring this medication list with you to all your doctor appointments.  If you have not seen your opthalmologist this year call for appointment.  Check your feet daily for redness, sores, or openings. Do not self treat. If no improvement in two days call your primary care physician for an appointment.  Low blood sugar (hypoglycemia) is a blood sugar below 70mg/dl. Check your blood sugar if you feel signs/symptoms of hypoglycemia. If your blood sugar is below 70 take 15 grams of carbohydrates (ex 4 oz of apple juice, 3-4 glucosr tablets, or 4-6 oz of regular soda) wait 15 minutes and repeat blood sugar to make sure it comes up above 70.  If your blood sugar is above 70 and you are due for a meal, have a meal.  If you are not due for a meal have a snack.  This snack helps keeps your blood sugar at a safe range.       PRINCIPAL DISCHARGE DIAGNOSIS  Diagnosis: STEMI (ST elevation myocardial infarction)  Assessment and Plan of Treatment: You had a heart attack to the anterior portion of your heart. You had one stent placed. Please continue taking the medications as ordered as they serve to prevent your stent from closing up and help prevent remodelling of your heart.   Follow up with Cardiologist as an outpatient.      SECONDARY DISCHARGE DIAGNOSES  Diagnosis: Diabetes  Assessment and Plan of Treatment: You have a historay of type 2 diabetes. Your HgA1C this admissionw was noted to be 9.1. Ideally, we awnt this number less than 7.   Make sure you get your HgA1c checked every three months.  If you take oral diabetes medications, check your blood glucose two times a day.  If you take insulin, check your blood glucose before meals and at bedtime.  It's important not to skip any meals.  Keep a log of your blood glucose results and always take it with you to your doctor appointments.  Keep a list of your current medications including injectables and over the counter medications and bring this medication list with you to all your doctor appointments.  If you have not seen your opthalmologist this year call for appointment.  Check your feet daily for redness, sores, or openings. Do not self treat. If no improvement in two days call your primary care physician for an appointment.  Low blood sugar (hypoglycemia) is a blood sugar below 70mg/dl. Check your blood sugar if you feel signs/symptoms of hypoglycemia. If your blood sugar is below 70 take 15 grams of carbohydrates (ex 4 oz of apple juice, 3-4 glucosr tablets, or 4-6 oz of regular soda) wait 15 minutes and repeat blood sugar to make sure it comes up above 70.  If your blood sugar is above 70 and you are due for a meal, have a meal.  If you are not due for a meal have a snack.  This snack helps keeps your blood sugar at a safe range.      Diagnosis: Hypertension  Assessment and Plan of Treatment: Low salt diet  Activity as tolerated.  Take all medication as prescribed.  Follow up with your medical doctor for routine blood pressure monitoring at your next visit.  Notify your doctor if you have any of the following symptoms:   Dizziness, Lightheadedness, Blurry vision, Headache, Chest pain, Shortness of breath

## 2021-12-24 NOTE — PROGRESS NOTE ADULT - PROBLEM SELECTOR PLAN 7
-Patient to be discharged today; no events on telemetry, normotensive   -discharge w/ ASA, brilinta, losartan, metoprolol XL, and lipitor  -Insurance does not cover entresto (~$300), per cardiology, substituted w/ losartan   -will continue home dose of insulin for his T2DM; educated that his A1c is 9% and ideally would want it <7%. Will f/u w/ PCP regarding endocrine management  -patient educated on low sodium diet and compliance to medications. Will f/u w/ Cardiology outpatient in 1 week

## 2021-12-24 NOTE — PROGRESS NOTE ADULT - PROBLEM SELECTOR PLAN 2
A1c > 9%  - Lantus  hs   - Lispro w/ meals   - insulin correction scale  - check fsg qac/qhs  - consistent carb diet
A1c > 9% , would benefit from endocrine input   - Lantus  hs   - Lispro w/ meals   - insulin correction scale  - check fsg qac/qhs  - check a1c in am  - consistent carb diet  - endo consult- to be arranged and followed up by day team

## 2021-12-24 NOTE — DISCHARGE NOTE NURSING/CASE MANAGEMENT/SOCIAL WORK - PATIENT PORTAL LINK FT
You can access the FollowMyHealth Patient Portal offered by [facility] by registering at the following website: [email or web address]. By joining [facility]’s FollowMyHealth portal, you will also be able to view your health information using other applications (apps) compatible with our system.

## 2021-12-24 NOTE — DISCHARGE NOTE NURSING/CASE MANAGEMENT/SOCIAL WORK - NSDCFUADDAPPT_GEN_ALL_CORE_FT
From: Tho Aviles  To: Jael Cook MD  Sent: 1/8/2018 7:32 PM EST  Subject: Non-Urgent Medical Question    Dr. Vladimir Villagomez,    I am very interested in going to a diabetes training session as I have never been educated on the dieting etc. Sarahi Whitten and I are very interested in attending such education. Could you please refer me to where you'd approve and if I need a RX to attend please help me out with that too? I am doing much better keeping my BP down, sugar has lowered lots and I'm feeling better everyday. It's been slow but progress is being made. I bet you are sick of hearing the [employer] name!     Thanks so much,    [facility] APPTS ARE READY TO BE MADE: [x] YES    Best Family or Patient Contact (if needed):    Additional Information about above appointments (if needed):    1:   2:   3:     Other comments or requests:       Follow up with PMD and Cardiologist as an outpatient.   Please follow up with your PMD for your Diabetes management as your A1c was 9.0 at discharge.

## 2021-12-24 NOTE — DISCHARGE NOTE NURSING/CASE MANAGEMENT/SOCIAL WORK - NSTOBACCONEVERSMOKERY/N_GEN_A
PT order received, evaluation in progress. Mr. Ester Sandoval is on complete bedrest right now. Observed him moving from the stretcher to the bed independently with extra time. He wont let anyone touch his left leg due to pain. Have reached out to oncology and will to orthopedics to determine any restrictions to left leg with regards to mobility, weightbearing status. Right now with him in so much pain and not letting anyone touch his leg there is nothing for us to do. After observing him move from stretcher to bed it is clear that if his pain was controlled he would be moving well. Will continue to communicate with team and assist as able. Once evaluation complete will set goals and plan.   Ez Limon, PT No

## 2021-12-24 NOTE — DISCHARGE NOTE NURSING/CASE MANAGEMENT/SOCIAL WORK - NSDCPEFALRISK_GEN_ALL_CORE
For information on Fall & Injury Prevention, visit: [email or web address] OR  [email or web address] OR  https://www.cdc.gov/steadi/patient.html

## 2021-12-24 NOTE — DISCHARGE NOTE PROVIDER - CARE PROVIDERS DIRECT ADDRESSES
[email or web address]

## 2021-12-24 NOTE — PROGRESS NOTE ADULT - SUBJECTIVE AND OBJECTIVE BOX
Yolanda Frederick  Internal Medicine     Patient is a 56y old  Male who presents with a chief complaint of STEMI (24 Dec 2021 13:10)      SUBJECTIVE / OVERNIGHT EVENTS: No events on telemetry. Shannonn seen and examined at bedside. He states that he feels fine, denies CP, SOB, headache, or N/V. Anxious to go home.         MEDICATIONS  (STANDING):  aspirin  chewable 81 milliGRAM(s) Oral daily  atorvastatin 80 milliGRAM(s) Oral at bedtime  chlorhexidine 4% Liquid 1 Application(s) Topical <User Schedule>  heparin   Injectable 5000 Unit(s) SubCutaneous every 8 hours  influenza   Vaccine 0.5 milliLiter(s) IntraMuscular once  insulin glargine Injectable (LANTUS) 12 Unit(s) SubCutaneous at bedtime  insulin lispro (ADMELOG) corrective regimen sliding scale   SubCutaneous at bedtime  insulin lispro (ADMELOG) corrective regimen sliding scale   SubCutaneous three times a day before meals  insulin lispro Injectable (ADMELOG) 4 Unit(s) SubCutaneous three times a day before meals  losartan 25 milliGRAM(s) Oral daily  metoprolol succinate ER 25 milliGRAM(s) Oral daily  ticagrelor 90 milliGRAM(s) Oral every 12 hours    MEDICATIONS  (PRN):      CAPILLARY BLOOD GLUCOSE      POCT Blood Glucose.: 208 mg/dL (24 Dec 2021 12:22)  POCT Blood Glucose.: 288 mg/dL (24 Dec 2021 07:52)  POCT Blood Glucose.: 184 mg/dL (23 Dec 2021 21:35)  POCT Blood Glucose.: 226 mg/dL (23 Dec 2021 17:30)    I&O's Summary    23 Dec 2021 07:01  -  24 Dec 2021 07:00  --------------------------------------------------------  IN: 200 mL / OUT: 1100 mL / NET: -900 mL    24 Dec 2021 07:01  -  24 Dec 2021 14:48  --------------------------------------------------------  IN: 720 mL / OUT: 400 mL / NET: 320 mL        PHYSICAL EXAM:    Vital Signs Last 24 Hrs  T(C): 36.8 (24 Dec 2021 11:11), Max: 37.4 (24 Dec 2021 04:23)  T(F): 98.2 (24 Dec 2021 11:11), Max: 99.4 (24 Dec 2021 04:23)  HR: 82 (24 Dec 2021 11:11) (69 - 86)  BP: 140/86 (24 Dec 2021 11:11) (114/73 - 140/86)  BP(mean): --  RR: 18 (24 Dec 2021 11:11) (16 - 18)  SpO2: 98% (24 Dec 2021 11:11) (96% - 98%)    CONSTITUTIONAL: NAD, well-developed, well-groomed  EYES: Conjunctiva and sclera clear  ENMT: Moist oral mucosa, no pharyngeal injection or exudates; normal dentition  NECK: Supple, no palpable masses; no thyromegaly  RESPIRATORY: Normal respiratory effort; lungs are clear to auscultation bilaterally  CARDIOVASCULAR: Regular rate and rhythm, normal S1 and S2, no murmur/rub/gallop; No lower extremity edema; Peripheral pulses are 2+ bilaterally  ABDOMEN: Nontender to palpation, normoactive bowel sounds, no rebound/guarding  MUSCULOSKELETAL: No clubbing or cyanosis of digits; no joint swelling or tenderness to palpation  PSYCH: A+O to person, place, and time; affect appropriate  NEUROLOGY: CN 2-12 are intact and symmetric; no gross sensory deficits   SKIN: No rashes; no palpable lesions    LABS:                        15.0   12.42 )-----------( 210      ( 24 Dec 2021 06:08 )             45.2     12-24    135  |  100  |  13  ----------------------------<  253<H>  3.6   |  22  |  1.09    Ca    8.7      24 Dec 2021 11:13  Phos  2.6     12-24  Mg     2.1     12-24    TPro  7.1  /  Alb  4.1  /  TBili  0.6  /  DBili  x   /  AST  52<H>  /  ALT  33  /  AlkPhos  99  12-24      CARDIAC MARKERS ( 23 Dec 2021 01:02 )  x     / x     / 1456 U/L / x     / 71.6 ng/mL  CARDIAC MARKERS ( 22 Dec 2021 16:19 )  x     / x     / 2306 U/L / x     / 129.5 ng/mL            RADIOLOGY & ADDITIONAL TESTS: No new imaging  Results Reviewed: Yes  Imaging Personally Reviewed:  Electrocardiogram Personally Reviewed:    COORDINATION OF CARE:  Care Discussed with Consultants/Other Providers [Y/N]:  Prior or Outpatient Records Reviewed [Y/N]:

## 2021-12-24 NOTE — DISCHARGE NOTE PROVIDER - CARE PROVIDER_API CALL
Jad Kay J  CARDIOVASCULAR DISEASE  [address] [place] [id number]  Phone: [phone]  Fax: [phone]  Follow Up Time:    Jad Kay J  CARDIOVASCULAR DISEASE  [address] [place] [id number]  Phone: [phone]  Fax: [phone]  Follow Up Time: 1 week

## 2021-12-24 NOTE — DISCHARGE NOTE PROVIDER - NSDCMRMEDTOKEN_GEN_ALL_CORE_FT
aspirin 81 mg oral tablet, chewable: 1 tab(s) orally once a day  atorvastatin 80 mg oral tablet: 1 tab(s) orally once a day (at bedtime)  HumuLIN 70/30 subcutaneous suspension: 20 unit(s) subcutaneous 2 times a day  losartan 25 mg oral tablet: 1 tab(s) orally once a day  metoprolol succinate 25 mg oral tablet, extended release: 1 tab(s) orally once a day  ticagrelor 90 mg oral tablet: 1 tab(s) orally every 12 hours

## 2021-12-24 NOTE — PROGRESS NOTE ADULT - ATTENDING COMMENTS
Patient admitted with anterior wall STEMI. Status post emergent PCI to culprit vessel yesterday.  Plan for staged assessment of RCA disease.    Continue dual antiplatelet therapy, high intensity statin.  Guideline directed medical therapy including beta-blocker as tolerated.
Agree with plan as outlined above.  AWSTEMI; await 72hr echo for thrombus  GDMT for CAD and HFrEF                                                                      Forty-1 Minutes Spent in Patient Management

## 2021-12-24 NOTE — PROGRESS NOTE ADULT - PROBLEM SELECTOR PLAN 3
improving : Likely 2/2 hypoperfusion in setting of MI   - Continue to trend Cr   - monitor urine output
resolved  - monitor urine output

## 2021-12-24 NOTE — DISCHARGE NOTE PROVIDER - NSDCCPTREATMENT_GEN_ALL_CORE_FT
PRINCIPAL PROCEDURE  Procedure: Complete cardiac catheterization  Findings and Treatment: You had a cardiac catheterization of your heart. It was found that one of your coronary arteries, called the left anterior descending artery, was 90% occluded. A stent was placed.

## 2021-12-24 NOTE — DISCHARGE NOTE PROVIDER - HOSPITAL COURSE
55yo M with PMH DM, HTN, HLD presenting [facility] [place] [facility]    STEMI: s/p PCI to pLAD  -RCA 50% with high iFR, nonsignificant lesion, no PCI done for this  -EF 30%  -GDMT: DC on [id number] milliGRAM(s) Oral daily, atorvastatin 80 milliGRAM(s), ticagrelor 90 milliGRAM(s) BID, start metoprolol xl 25mg, and Losartan 25mg. DC home with  outpatient follow up with Cards and PMD.      Patient is a 57yo M with PMH DM, HTN, HLD presenting as transfer from [place] for anterior STEMI s/p DESx1 to [facility].    Patient had PCI on 12/22 w/ ANA to [facility]. Noted to have pRCA 70% and diag 70%, EF 30%. Planned for staged PCI on 12/23, but did not place stent. Managed on medicine floors and was started on goal directed medical therapy, including ASA, brilinta, atorvastatin, metoprolol XL 25 daily, and losartan 25 mg daily. Patient's insurance does not cover entresto. No events on telemetry throughout hospitalization. obtained TTE on 12/24 and no evidence of LV thrombus. Patient to be discharged w/ f/dianna w/ Cardiology in 1 week.

## 2021-12-24 NOTE — PROGRESS NOTE ADULT - SUBJECTIVE AND OBJECTIVE BOX
Loyda Menendez MD  Cardiology Fellow  [phone]  All Cardiology service information can be found 24/7 on [email or web address], password: [email or web address]      Overnight Events: No events    Review Of Systems: No chest pain, shortness of breath, or palpitations            Current Meds:  aspirin  chewable 81 milliGRAM(s) Oral daily  atorvastatin 80 milliGRAM(s) Oral at bedtime  chlorhexidine 4% Liquid 1 Application(s) Topical <User Schedule>  heparin   Injectable 5000 Unit(s) SubCutaneous every 8 hours  influenza   Vaccine 0.5 milliLiter(s) IntraMuscular once  insulin glargine Injectable (LANTUS) 12 Unit(s) SubCutaneous at bedtime  insulin lispro (ADMELOG) corrective regimen sliding scale   SubCutaneous at bedtime  insulin lispro (ADMELOG) corrective regimen sliding scale   SubCutaneous three times a day before meals  insulin lispro Injectable (ADMELOG) 4 Unit(s) SubCutaneous three times a day before meals  ticagrelor 90 milliGRAM(s) Oral every 12 hours      Vitals:  T(F): 99.4 (12-24), Max: 99.4 (12-24)  HR: 86 (12-24) (69 - 86)  BP: 114/73 (12-24) (111/71 - 133/78)  BP(mean): 88 (12-23)  RR: 16 (12-24)  SpO2: 98% (12-24)  I&O's Summary    23 Dec 2021 07:01  -  24 Dec 2021 07:00  --------------------------------------------------------  IN: 200 mL / OUT: 1100 mL / NET: -900 mL        Physical Exam:  Appearance: No acute distress; well appearing  Eyes: PERRL, EOMI, pink conjunctiva  HEENT: Normal oral mucosa  Cardiovascular: RRR, S1, S2, no murmurs, rubs, or gallops; no edema; no JVD  Respiratory: Clear to auscultation bilaterally  Gastrointestinal: soft, non-tender, non-distended with normal bowel sounds  Musculoskeletal: No clubbing; no joint deformity   Neurologic: Non-focal  Lymphatic: No lymphadenopathy  Psychiatry: AAOx3, mood & affect appropriate  Skin: No rashes, ecchymoses, or cyanosis                          15.0   12.42 )-----------( 210      ( 24 Dec 2021 06:08 )             45.2     12-24    140  |  103  |  16  ----------------------------<  218<H>  5.4<H>   |  23  |  1.26    Ca    9.3      24 Dec 2021 06:08  Phos  2.6     12-24  Mg     2.1     12-24    TPro  7.1  /  Alb  4.1  /  TBili  0.6  /  DBili  x   /  AST  52<H>  /  ALT  33  /  AlkPhos  99  12-24    PT/INR - ( 22 Dec 2021 08:35 )   PT: 13.9 sec;   INR: 1.18 ratio         PTT - ( 22 Dec 2021 08:35 )  PTT:41.3 sec  CARDIAC MARKERS ( 23 Dec 2021 01:02 )  6676 ng/L / x     / x     / 1456 U/L / x     / 71.6 ng/mL  CARDIAC MARKERS ( 22 Dec 2021 16:19 )  8759 ng/L / x     / x     / 2306 U/L / x     / 129.5 ng/mL      Serum Pro-Brain Natriuretic Peptide: 553 pg/mL (12-23 @ 01:02)

## 2021-12-24 NOTE — PROGRESS NOTE ADULT - ASSESSMENT
55yo M with PMH DM, HTN, HLD presenting as [facility] [place] [facility]    STEMI: s/p PCI to pLAD  -RCA 50% with high iFR, nonsignificant lesion, no PCI done for this  -EF 30%  -GDMT:   aspirin  chewable 81 milliGRAM(s) Oral daily  atorvastatin 80 milliGRAM(s) Oral at bedtime  ticagrelor 90 milliGRAM(s) Oral every 12 hours  start metoprolol xl 25mg   start entresto 24/26 BID if insurance allows, otherwise can do losaratn 25mg POD  also start SGLT-2 given HF and DM, which ever is covered by insurance (empagloflozin, dapagloflozin)     Loyda Menendez MD  Cardiology Fellow  55yo M with PMH DM, HTN, HLD presenting [facility] [place] [facility]    STEMI: s/p PCI to pLAD  -RCA 50% with high iFR, nonsignificant lesion, no PCI done for this  -EF 30%  -GDMT:   aspirin  chewable 81 milliGRAM(s) Oral daily  atorvastatin 80 milliGRAM(s) Oral at bedtime  ticagrelor 90 milliGRAM(s) Oral every 12 hours  start metoprolol xl 25mg   start entresto 24/26 BID if insurance allows, otherwise can do losaratn 25mg POD  can start SGLT2 as outpatient  Please get limited TTE to rule out LV thrombus given AWMI. He can go home after that.     Loyda Menendez MD  Cardiology Fellow

## 2021-12-24 NOTE — DISCHARGE NOTE PROVIDER - NSDCFUADDAPPT_GEN_ALL_CORE_FT
Follow up with PMD and Cardiologist as an outpatient.  APPTS ARE READY TO BE MADE: [x] YES    Best Family or Patient Contact (if needed):    Additional Information about above appointments (if needed):    1:   2:   3:     Other comments or requests:       Follow up with PMD and Cardiologist as an outpatient.   Please follow up with your PMD for your Diabetes management as your A1c was 9.0 at discharge.

## 2021-12-27 PROBLEM — E11.9 TYPE 2 DIABETES MELLITUS WITHOUT COMPLICATIONS: Chronic | Status: ACTIVE | Noted: 2021-12-22

## 2021-12-27 PROBLEM — Z00.00 ENCOUNTER FOR PREVENTIVE HEALTH EXAMINATION: Status: ACTIVE | Noted: 2021-12-27

## 2021-12-27 PROBLEM — I10 ESSENTIAL (PRIMARY) HYPERTENSION: Chronic | Status: ACTIVE | Noted: 2021-12-22

## 2021-12-27 PROBLEM — E78.5 HYPERLIPIDEMIA, UNSPECIFIED: Chronic | Status: ACTIVE | Noted: 2021-12-22

## 2021-12-28 ENCOUNTER — APPOINTMENT (OUTPATIENT)
Dept: CARDIOLOGY | Facility: CLINIC | Age: 56
End: 2021-12-28
Payer: COMMERCIAL

## 2021-12-28 ENCOUNTER — NON-APPOINTMENT (OUTPATIENT)
Age: 56
End: 2021-12-28

## 2021-12-28 VITALS
DIASTOLIC BLOOD PRESSURE: 88 MMHG | HEART RATE: 78 BPM | WEIGHT: 186 LBS | SYSTOLIC BLOOD PRESSURE: 144 MMHG | OXYGEN SATURATION: 100 % | HEIGHT: 71 IN | BODY MASS INDEX: 26.04 KG/M2

## 2021-12-28 PROCEDURE — 99402 PREV MED CNSL INDIV APPRX 30: CPT

## 2021-12-28 PROCEDURE — 99215 OFFICE O/P EST HI 40 MIN: CPT | Mod: 25

## 2021-12-28 PROCEDURE — 93000 ELECTROCARDIOGRAM COMPLETE: CPT

## 2022-01-25 ENCOUNTER — NON-APPOINTMENT (OUTPATIENT)
Age: 57
End: 2022-01-25

## 2022-01-25 ENCOUNTER — APPOINTMENT (OUTPATIENT)
Dept: CARDIOLOGY | Facility: CLINIC | Age: 57
End: 2022-01-25
Payer: COMMERCIAL

## 2022-01-25 VITALS
HEART RATE: 76 BPM | OXYGEN SATURATION: 99 % | HEIGHT: 71 IN | SYSTOLIC BLOOD PRESSURE: 129 MMHG | DIASTOLIC BLOOD PRESSURE: 80 MMHG

## 2022-01-25 PROCEDURE — 93000 ELECTROCARDIOGRAM COMPLETE: CPT

## 2022-01-25 PROCEDURE — 99402 PREV MED CNSL INDIV APPRX 30: CPT

## 2022-01-25 PROCEDURE — 99215 OFFICE O/P EST HI 40 MIN: CPT | Mod: 25

## 2022-02-22 ENCOUNTER — OUTPATIENT (OUTPATIENT)
Dept: OUTPATIENT SERVICES | Facility: HOSPITAL | Age: 57
LOS: 1 days | End: 2022-02-22
Payer: COMMERCIAL

## 2022-02-22 ENCOUNTER — APPOINTMENT (OUTPATIENT)
Dept: CV DIAGNOSTICS | Facility: HOSPITAL | Age: 57
End: 2022-02-22

## 2022-02-22 ENCOUNTER — APPOINTMENT (OUTPATIENT)
Dept: CV DIAGNOSITCS | Facility: HOSPITAL | Age: 57
End: 2022-02-22

## 2022-02-22 DIAGNOSIS — R94.39 ABNORMAL RESULT OF OTHER CARDIOVASCULAR FUNCTION STUDY: ICD-10-CM

## 2022-02-22 DIAGNOSIS — I25.10 ATHEROSCLEROTIC HEART DISEASE OF NATIVE CORONARY ARTERY WITHOUT ANGINA PECTORIS: ICD-10-CM

## 2022-02-22 DIAGNOSIS — Z78.9 OTHER SPECIFIED HEALTH STATUS: Chronic | ICD-10-CM

## 2022-02-22 PROCEDURE — 93016 CV STRESS TEST SUPVJ ONLY: CPT

## 2022-02-22 PROCEDURE — C8929: CPT

## 2022-02-22 PROCEDURE — 93017 CV STRESS TEST TRACING ONLY: CPT

## 2022-02-22 PROCEDURE — 93306 TTE W/DOPPLER COMPLETE: CPT | Mod: 26

## 2022-02-22 PROCEDURE — 93018 CV STRESS TEST I&R ONLY: CPT

## 2022-02-22 PROCEDURE — 78452 HT MUSCLE IMAGE SPECT MULT: CPT | Mod: 26,MH

## 2022-02-22 PROCEDURE — 78452 HT MUSCLE IMAGE SPECT MULT: CPT | Mod: MH

## 2022-02-22 PROCEDURE — A9500: CPT

## 2022-02-28 ENCOUNTER — NON-APPOINTMENT (OUTPATIENT)
Age: 57
End: 2022-02-28

## 2022-03-01 ENCOUNTER — OUTPATIENT (OUTPATIENT)
Dept: OUTPATIENT SERVICES | Facility: HOSPITAL | Age: 57
LOS: 1 days | End: 2022-03-01
Payer: COMMERCIAL

## 2022-03-01 VITALS
OXYGEN SATURATION: 100 % | SYSTOLIC BLOOD PRESSURE: 169 MMHG | WEIGHT: 190.04 LBS | HEIGHT: 71 IN | DIASTOLIC BLOOD PRESSURE: 81 MMHG | HEART RATE: 75 BPM | RESPIRATION RATE: 16 BRPM | TEMPERATURE: 98 F

## 2022-03-01 VITALS
HEART RATE: 85 BPM | SYSTOLIC BLOOD PRESSURE: 139 MMHG | OXYGEN SATURATION: 97 % | DIASTOLIC BLOOD PRESSURE: 74 MMHG | RESPIRATION RATE: 20 BRPM

## 2022-03-01 DIAGNOSIS — Z78.9 OTHER SPECIFIED HEALTH STATUS: Chronic | ICD-10-CM

## 2022-03-01 DIAGNOSIS — R94.39 ABNORMAL RESULT OF OTHER CARDIOVASCULAR FUNCTION STUDY: ICD-10-CM

## 2022-03-01 DIAGNOSIS — Z95.5 PRESENCE OF CORONARY ANGIOPLASTY IMPLANT AND GRAFT: Chronic | ICD-10-CM

## 2022-03-01 LAB
ANION GAP SERPL CALC-SCNC: 13 MMOL/L — SIGNIFICANT CHANGE UP (ref 5–17)
BUN SERPL-MCNC: 21 MG/DL — SIGNIFICANT CHANGE UP (ref 7–23)
CALCIUM SERPL-MCNC: 9.5 MG/DL — SIGNIFICANT CHANGE UP (ref 8.4–10.5)
CHLORIDE SERPL-SCNC: 103 MMOL/L — SIGNIFICANT CHANGE UP (ref 96–108)
CO2 SERPL-SCNC: 21 MMOL/L — LOW (ref 22–31)
CREAT SERPL-MCNC: 1.14 MG/DL — SIGNIFICANT CHANGE UP (ref 0.5–1.3)
EGFR: 76 ML/MIN/1.73M2 — SIGNIFICANT CHANGE UP
GLUCOSE BLDC GLUCOMTR-MCNC: 256 MG/DL — HIGH (ref 70–99)
GLUCOSE BLDC GLUCOMTR-MCNC: 261 MG/DL — HIGH (ref 70–99)
GLUCOSE BLDC GLUCOMTR-MCNC: 267 MG/DL — HIGH (ref 70–99)
GLUCOSE SERPL-MCNC: 291 MG/DL — HIGH (ref 70–99)
HCT VFR BLD CALC: 44.2 % — SIGNIFICANT CHANGE UP (ref 39–50)
HGB BLD-MCNC: 14.6 G/DL — SIGNIFICANT CHANGE UP (ref 13–17)
MCHC RBC-ENTMCNC: 27 PG — SIGNIFICANT CHANGE UP (ref 27–34)
MCHC RBC-ENTMCNC: 33 GM/DL — SIGNIFICANT CHANGE UP (ref 32–36)
MCV RBC AUTO: 81.7 FL — SIGNIFICANT CHANGE UP (ref 80–100)
NRBC # BLD: 0 /100 WBCS — SIGNIFICANT CHANGE UP (ref 0–0)
PLATELET # BLD AUTO: 220 K/UL — SIGNIFICANT CHANGE UP (ref 150–400)
POTASSIUM SERPL-MCNC: 4.1 MMOL/L — SIGNIFICANT CHANGE UP (ref 3.5–5.3)
POTASSIUM SERPL-SCNC: 4.1 MMOL/L — SIGNIFICANT CHANGE UP (ref 3.5–5.3)
RBC # BLD: 5.41 M/UL — SIGNIFICANT CHANGE UP (ref 4.2–5.8)
RBC # FLD: 14.6 % — HIGH (ref 10.3–14.5)
SODIUM SERPL-SCNC: 137 MMOL/L — SIGNIFICANT CHANGE UP (ref 135–145)
WBC # BLD: 12.43 K/UL — HIGH (ref 3.8–10.5)
WBC # FLD AUTO: 12.43 K/UL — HIGH (ref 3.8–10.5)

## 2022-03-01 PROCEDURE — C1769: CPT

## 2022-03-01 PROCEDURE — 93005 ELECTROCARDIOGRAM TRACING: CPT

## 2022-03-01 PROCEDURE — 93458 L HRT ARTERY/VENTRICLE ANGIO: CPT | Mod: 59

## 2022-03-01 PROCEDURE — 82962 GLUCOSE BLOOD TEST: CPT

## 2022-03-01 PROCEDURE — 99153 MOD SED SAME PHYS/QHP EA: CPT

## 2022-03-01 PROCEDURE — C1874: CPT

## 2022-03-01 PROCEDURE — C1894: CPT

## 2022-03-01 PROCEDURE — 93010 ELECTROCARDIOGRAM REPORT: CPT

## 2022-03-01 PROCEDURE — 99152 MOD SED SAME PHYS/QHP 5/>YRS: CPT

## 2022-03-01 PROCEDURE — 36415 COLL VENOUS BLD VENIPUNCTURE: CPT

## 2022-03-01 PROCEDURE — C1887: CPT

## 2022-03-01 PROCEDURE — C1725: CPT

## 2022-03-01 PROCEDURE — 93458 L HRT ARTERY/VENTRICLE ANGIO: CPT | Mod: 26,59

## 2022-03-01 PROCEDURE — 85027 COMPLETE CBC AUTOMATED: CPT

## 2022-03-01 PROCEDURE — 93010 ELECTROCARDIOGRAM REPORT: CPT | Mod: 77

## 2022-03-01 PROCEDURE — 80048 BASIC METABOLIC PNL TOTAL CA: CPT

## 2022-03-01 PROCEDURE — 92928 PRQ TCAT PLMT NTRAC ST 1 LES: CPT | Mod: RC

## 2022-03-01 PROCEDURE — C9600: CPT | Mod: RC

## 2022-03-01 RX ORDER — DEXTROSE 50 % IN WATER 50 %
15 SYRINGE (ML) INTRAVENOUS ONCE
Refills: 0 | Status: DISCONTINUED | OUTPATIENT
Start: 2022-03-01 | End: 2022-03-15

## 2022-03-01 RX ORDER — INSULIN LISPRO 100/ML
VIAL (ML) SUBCUTANEOUS AT BEDTIME
Refills: 0 | Status: DISCONTINUED | OUTPATIENT
Start: 2022-03-01 | End: 2022-03-15

## 2022-03-01 RX ORDER — DEXTROSE 50 % IN WATER 50 %
25 SYRINGE (ML) INTRAVENOUS ONCE
Refills: 0 | Status: DISCONTINUED | OUTPATIENT
Start: 2022-03-01 | End: 2022-03-15

## 2022-03-01 RX ORDER — INSULIN NPH HUM/REG INSULIN HM 70-30/ML
20 VIAL (ML) SUBCUTANEOUS
Qty: 0 | Refills: 0 | DISCHARGE

## 2022-03-01 RX ORDER — SODIUM CHLORIDE 9 MG/ML
1000 INJECTION, SOLUTION INTRAVENOUS
Refills: 0 | Status: DISCONTINUED | OUTPATIENT
Start: 2022-03-01 | End: 2022-03-15

## 2022-03-01 RX ORDER — INSULIN LISPRO 100/ML
VIAL (ML) SUBCUTANEOUS
Refills: 0 | Status: DISCONTINUED | OUTPATIENT
Start: 2022-03-01 | End: 2022-03-15

## 2022-03-01 RX ORDER — GLUCAGON INJECTION, SOLUTION 0.5 MG/.1ML
1 INJECTION, SOLUTION SUBCUTANEOUS ONCE
Refills: 0 | Status: DISCONTINUED | OUTPATIENT
Start: 2022-03-01 | End: 2022-03-15

## 2022-03-01 RX ORDER — DEXTROSE 50 % IN WATER 50 %
12.5 SYRINGE (ML) INTRAVENOUS ONCE
Refills: 0 | Status: DISCONTINUED | OUTPATIENT
Start: 2022-03-01 | End: 2022-03-15

## 2022-03-01 RX ADMIN — Medication 3: at 11:59

## 2022-03-01 RX ADMIN — Medication 3: at 16:37

## 2022-03-01 NOTE — ASU DISCHARGE PLAN (ADULT/PEDIATRIC) - ASU DC SPECIAL INSTRUCTIONSFT
Wound Care:   the day AFTER your procedure remove bandage GENTLY, and clean using  mild soap and gentle warm, water stream, pat dry. leave OPEN to air. YOU MAY SHOWER   DO NOT apply lotions, creams, ointments, powder, perfumes to your incision site  DO NOT SOAK your site for 1 week ( no baths, no pools, no tubs, etc...)  Check  your groin and /or wrist daily.A small amount of bruising, and soarness are normal    ACTIVITY: for 24 hours   - DO NOT DRIVE  - DO NOT make any important decisions or sign legal documents   - DO NOT operate heavy machineries   - you may resume sexual activity in 48 hours, unless otherwise instructed by your cardiologist     If your procedure was done through the WRIST: for the NEXT 3DAYS:  - avoid pushing, pulling, with that affected wrist   - avoid repeated movement of that hand and wrist ( eg: typing, hammering)  - DO NOT LIFT anything more than 5 lbs     If your procedure was done through the GROIN: for the NEXT 5 DAYS  - Limit climbing stairs, DO NOT soak in bathtub or pool  - no strenuous activities, pushing, pulling, straining  - Do not lift anything 10lbs or heavier     MEDICATION:   take your medications as explained ( see discharge paperwork)   If you received a STENT, you will be taking antiplatelet medications to KEEP YOUR STENT OPEN ( eg: Aspirin, Plavix, Brilinta, Effient, etc).  Take as prescribed DO NOT STOP taking them without consulting with your cardiologist first.     Follow heart healthy diet recommended by your doctor, , if you smoke STOP SMOKING ( may call [phone] for center of tobacco control if you need assistance)     CALL your doctor to make appointment in 2 WEEKS     ***CALL YOUR DOCTOR***  if you experience: fever, chills, body aches, or severe pain, swelling, redness, heat or yellow discharge at incision site  If you experience Bleeding or excruciating pain at the procedural site, swelling ( golf ball size) at your procedural site  If you experience CHEST PAIN  If you experience extremity numbness, tingling, temperature change ( of your procedural site)   If you are unable to reach your doctor, you may contact:   -Cardiology Office at [facility] at [phone] or   - [facility] [phone]  - [facility] [phone]

## 2022-03-01 NOTE — ASU DISCHARGE PLAN (ADULT/PEDIATRIC) - NS MD DC FALL RISK RISK
For information on Fall & Injury Prevention, visit: [email or web address] OR  [email or web address] OR  https://www.cdc.gov/steadi/patient.html

## 2022-03-01 NOTE — H&P CARDIOLOGY - HISTORY OF PRESENT ILLNESS
55yo M with PMH DMT2 (controlled, Hgba1c ,pt follows PMD), HTN, HLD, STEMI, CAD/DESx1 placed in pLAD 12/21. Cath also showed pRCA 70% and diag 70%; plan for staged PCI Thursday.  TTE 12/21-LVEF 30-35%  Pt was referred for Cardiac Cath by Dr Vidales.  55yo M with PMH DMT2 (controlled, Hgba1c ,pt follows PMD), HTN, HLD, STEMI, CAD/DESx1 placed in pLAD 12/22/21. Cath also showed pRCA 70% and diag 70%, 12/23/21 iFR of RCA 0.98, treated medically.   TTE 12/21-LVEF 30-35%  Pt was referred for Cardiac Cath by Dr Vidales.       < from: Cardiac Catheterization (12.23.21 @ 13:21) >  Diagnostic Findings:   Coronary Angiography -The coronary circulation is right dominant.    RCA-Proximal right coronary artery: There is a 50 % stenosis. Instant wave-free ratio was performed with a calculated value of 0.98.  Based on the results of this study, the lesion was judged to be non-significant and no intervention was performed.       55yo M with PMH DMT2 (controlled, Hgba1c ,pt follows PMD), HTN, HLD, STEMI, CAD/DESx1 placed in pLAD 12/22/21. Cath also showed pRCA 70% and diag 70%, 12/23/21 iFR of RCA 0.98, treated medically. Pt reports chest pain for past few weeks, wakes him up, lasts for an hour.  Pt underwent Nuclear Stress Test on 2/22/22 which was Abnormal. TTE 12/21-LVEF 30-35%. Pt was referred for Cardiac Cath by Dr Vidales.     < from: Nuclear Stress Test-Pharmacologic (02.22.22 @ 11:41) >  IMPRESSIONS:Abnormal Study  * Chest Pain: No chest pain with administration of  Regadenoson.  * Symptom: Nausea.  * HR Response: Appropriate.  * BP Response: Appropriate.  * Heart Rhythm: Sinus Rhythm - 75 BPM.  * Baseline ECG: T wave abnormality, possible anterolateral ischemia.  * ECG Changes: No significant ischemic ST segment changes beyond baseline abnormalities.  * Arrhythmia: Rare VPDs occurred during stress.  * The left ventricle was normal in size.  * There are medium-sized, mild defects in the basal to mid anterior and basal to mid anterolateral walls that are reversible suggestive of ischemia.  * There are large, moderate to severe defects in the anteroseptal, distal anterior, and apical walls that are partially reversible suggestive of infarct with moderate  deborah-infarct ischemia.  * There are large, mild to moderate defects in the inferior, inferolateral, and basal inferoseptal walls that are mostly reversible suggestive of infarct with  significant deborah-infarct ischemia.  * The left ventricular cavity appears to dilate with stress with a quantified TID value of 1.25 which is abnormal for this protocol.  * Post-stress gated wall motion analysis was performed (LVEF = 43 %;LVEDV = 119 ml.) revealing hypokinesis of the anteroseptal, distal anterior, apical, basal inferior, and basal inferoseptal walls and reduced systolic thickening of the basal to mid anterior, basal to mid anterolateral, mid to distal inferior, and mid to distal inferolateral walls.        < from: Cardiac Catheterization (12.23.21 @ 13:21) >  Diagnostic Findings:   Coronary Angiography -The coronary circulation is right dominant.    RCA-Proximal right coronary artery: There is a 50 % stenosis. Instant wave-free ratio was performed with a calculated value of 0.98.  Based on the results of this study, the lesion was judged to be non-significant and no intervention was performed.       57yo M with PMH DMT2 (uncontrolled, Hgba1c 8.2 ,pt follows PMD Dr Fuller), HTN, HLD, STEMI, CAD/DESx1 placed in pLAD 12/22/21. Cath also showed pRCA 70% and diag 70%, 12/23/21 iFR of RCA 0.98, treated medically. Pt reports chest pain for past few weeks, wakes him up, lasts for an hour.  Pt underwent Nuclear Stress Test on 2/22/22 which was Abnormal. TTE 12/21-LVEF 30-35%. Pt was referred for Cardiac Cath by Dr Vidales.     < from: Nuclear Stress Test-Pharmacologic (02.22.22 @ 11:41) >  IMPRESSIONS:Abnormal Study  * Chest Pain: No chest pain with administration of  Regadenoson.  * Symptom: Nausea.  * HR Response: Appropriate.  * BP Response: Appropriate.  * Heart Rhythm: Sinus Rhythm - 75 BPM.  * Baseline ECG: T wave abnormality, possible anterolateral ischemia.  * ECG Changes: No significant ischemic ST segment changes beyond baseline abnormalities.  * Arrhythmia: Rare VPDs occurred during stress.  * The left ventricle was normal in size.  * There are medium-sized, mild defects in the basal to mid anterior and basal to mid anterolateral walls that are reversible suggestive of ischemia.  * There are large, moderate to severe defects in the anteroseptal, distal anterior, and apical walls that are partially reversible suggestive of infarct with moderate  deborah-infarct ischemia.  * There are large, mild to moderate defects in the inferior, inferolateral, and basal inferoseptal walls that are mostly reversible suggestive of infarct with  significant deborah-infarct ischemia.  * The left ventricular cavity appears to dilate with stress with a quantified TID value of 1.25 which is abnormal for this protocol.  * Post-stress gated wall motion analysis was performed (LVEF = 43 %;LVEDV = 119 ml.) revealing hypokinesis of the anteroseptal, distal anterior, apical, basal inferior, and basal inferoseptal walls and reduced systolic thickening of the basal to mid anterior, basal to mid anterolateral, mid to distal inferior, and mid to distal inferolateral walls.        < from: Cardiac Catheterization (12.23.21 @ 13:21) >  Diagnostic Findings:   Coronary Angiography -The coronary circulation is right dominant.    RCA-Proximal right coronary artery: There is a 50 % stenosis. Instant wave-free ratio was performed with a calculated value of 0.98.  Based on the results of this study, the lesion was judged to be non-significant and no intervention was performed.       55yo [place] M with PMH DMT2 (uncontrolled, Hgba1c 8.2 ,pt follows PMD Dr Fuller), HTN, HLD, STEMI, CAD/DESx1 placed in pLAD 12/22/21. Cath also showed pRCA 70% and diag 70%, 12/23/21 iFR of RCA 0.98, treated medically. Pt reports chest pain for past few weeks, wakes him up, lasts for an hour.  Pt underwent Nuclear Stress Test on 2/22/22 which was Abnormal. TTE 12/21-LVEF 30-35%. Pt was referred for Cardiac Cath by Dr Vidales.     < from: Nuclear Stress Test-Pharmacologic (02.22.22 @ 11:41) >  IMPRESSIONS:Abnormal Study  * Chest Pain: No chest pain with administration of  Regadenoson.  * Symptom: Nausea.  * HR Response: Appropriate.  * BP Response: Appropriate.  * Heart Rhythm: Sinus Rhythm - 75 BPM.  * Baseline ECG: T wave abnormality, possible anterolateral ischemia.  * ECG Changes: No significant ischemic ST segment changes beyond baseline abnormalities.  * Arrhythmia: Rare VPDs occurred during stress.  * The left ventricle was normal in size.  * There are medium-sized, mild defects in the basal to mid anterior and basal to mid anterolateral walls that are reversible suggestive of ischemia.  * There are large, moderate to severe defects in the anteroseptal, distal anterior, and apical walls that are partially reversible suggestive of infarct with moderate  deborah-infarct ischemia.  * There are large, mild to moderate defects in the inferior, inferolateral, and basal inferoseptal walls that are mostly reversible suggestive of infarct with  significant deborah-infarct ischemia.  * The left ventricular cavity appears to dilate with stress with a quantified TID value of 1.25 which is abnormal for this protocol.  * Post-stress gated wall motion analysis was performed (LVEF = 43 %;LVEDV = 119 ml.) revealing hypokinesis of the anteroseptal, distal anterior, apical, basal inferior, and basal inferoseptal walls and reduced systolic thickening of the basal to mid anterior, basal to mid anterolateral, mid to distal inferior, and mid to distal inferolateral walls.        < from: Cardiac Catheterization (12.23.21 @ 13:21) >  Diagnostic Findings:   Coronary Angiography -The coronary circulation is right dominant.    RCA-Proximal right coronary artery: There is a 50 % stenosis. Instant wave-free ratio was performed with a calculated value of 0.98.  Based on the results of this study, the lesion was judged to be non-significant and no intervention was performed.

## 2022-03-01 NOTE — ASU DISCHARGE PLAN (ADULT/PEDIATRIC) - CARE PROVIDER_API CALL
Jad Kay)  Cardiovascular Disease; Internal Medicine  [address] [place] [address]  Phone: [phone]  Fax: [phone]  Follow Up Time:

## 2022-03-03 PROBLEM — I25.10 ATHEROSCLEROTIC HEART DISEASE OF NATIVE CORONARY ARTERY WITHOUT ANGINA PECTORIS: Chronic | Status: ACTIVE | Noted: 2022-03-01

## 2022-03-08 ENCOUNTER — APPOINTMENT (OUTPATIENT)
Dept: CARDIOLOGY | Facility: CLINIC | Age: 57
End: 2022-03-08
Payer: COMMERCIAL

## 2022-03-08 VITALS
WEIGHT: 200 LBS | DIASTOLIC BLOOD PRESSURE: 70 MMHG | HEIGHT: 71 IN | SYSTOLIC BLOOD PRESSURE: 128 MMHG | BODY MASS INDEX: 28 KG/M2 | OXYGEN SATURATION: 100 % | HEART RATE: 82 BPM

## 2022-03-08 PROCEDURE — 99215 OFFICE O/P EST HI 40 MIN: CPT

## 2022-07-07 ENCOUNTER — APPOINTMENT (OUTPATIENT)
Dept: CARDIOLOGY | Facility: CLINIC | Age: 57
End: 2022-07-07

## 2022-07-07 VITALS
HEART RATE: 89 BPM | WEIGHT: 220 LBS | HEIGHT: 71 IN | DIASTOLIC BLOOD PRESSURE: 72 MMHG | BODY MASS INDEX: 30.8 KG/M2 | SYSTOLIC BLOOD PRESSURE: 120 MMHG | OXYGEN SATURATION: 98 %

## 2022-07-07 DIAGNOSIS — R40.0 SOMNOLENCE: ICD-10-CM

## 2022-07-07 PROCEDURE — 99215 OFFICE O/P EST HI 40 MIN: CPT

## 2022-09-02 ENCOUNTER — APPOINTMENT (OUTPATIENT)
Dept: ENDOCRINOLOGY | Facility: CLINIC | Age: 57
End: 2022-09-02

## 2022-09-02 VITALS
WEIGHT: 223 LBS | SYSTOLIC BLOOD PRESSURE: 126 MMHG | DIASTOLIC BLOOD PRESSURE: 72 MMHG | OXYGEN SATURATION: 98 % | HEIGHT: 71 IN | TEMPERATURE: 97.6 F | HEART RATE: 88 BPM | BODY MASS INDEX: 31.22 KG/M2

## 2022-09-02 DIAGNOSIS — F12.90 CANNABIS USE, UNSPECIFIED, UNCOMPLICATED: ICD-10-CM

## 2022-09-02 DIAGNOSIS — Z78.9 OTHER SPECIFIED HEALTH STATUS: ICD-10-CM

## 2022-09-02 DIAGNOSIS — Z83.3 FAMILY HISTORY OF DIABETES MELLITUS: ICD-10-CM

## 2022-09-02 LAB
25(OH)D3 SERPL-MCNC: 30.6 NG/ML
ALBUMIN SERPL ELPH-MCNC: 4.5 G/DL
ALP BLD-CCNC: 104 U/L
ALT SERPL-CCNC: 21 U/L
ANION GAP SERPL CALC-SCNC: 14 MMOL/L
AST SERPL-CCNC: 17 U/L
BILIRUB SERPL-MCNC: 0.7 MG/DL
BUN SERPL-MCNC: 15 MG/DL
CALCIUM SERPL-MCNC: 10 MG/DL
CHLORIDE SERPL-SCNC: 100 MMOL/L
CHOLEST SERPL-MCNC: 188 MG/DL
CO2 SERPL-SCNC: 25 MMOL/L
CREAT SERPL-MCNC: 1.24 MG/DL
EGFR: 68 ML/MIN/1.73M2
FRUCTOSAMINE SERPL-MCNC: 423 UMOL/L
GLUCOSE SERPL-MCNC: 277 MG/DL
HDLC SERPL-MCNC: 74 MG/DL
LDLC SERPL CALC-MCNC: 91 MG/DL
NONHDLC SERPL-MCNC: 115 MG/DL
POTASSIUM SERPL-SCNC: 4.8 MMOL/L
PROT SERPL-MCNC: 7.3 G/DL
SODIUM SERPL-SCNC: 139 MMOL/L
TRIGL SERPL-MCNC: 120 MG/DL

## 2022-09-02 PROCEDURE — 99205 OFFICE O/P NEW HI 60 MIN: CPT

## 2022-09-02 RX ORDER — FLASH GLUCOSE SCANNING READER
EACH MISCELLANEOUS
Qty: 1 | Refills: 0 | Status: ACTIVE | COMMUNITY
Start: 2022-09-02 | End: 1900-01-01

## 2022-09-02 NOTE — HISTORY OF PRESENT ILLNESS
[FreeTextEntry1] : Patient is a 56 M with history of HTN, HL,T2DM, CAD S/p MI in [facility] 2021, s/p PCI>pLAD (residual pRCA 70% and Diag 70%), s/p PCI mRCA in , TTE-, EF 30-35% HFrEF here to establish care. Patient is accompanied by his daughter. \par \par FH: mother and siblings have diabetes\par SH: smokes weed, not cigarettes, does not drink alcohol \par Retired , lives with his wife who does most of the cooking\par \par # T2DM:\par Diabetes history:\par Diagnosed when he was 25 years old. Diagnosed with T2D when he went to his PCP, had blurry vision, polyuria, polydipsia. Has been on insulin for 30 years. \par \par Most recent A1C 9.2 2021\par \par Diabetes complications:\par Neuropathy: occasional numbness in finger tips\par Retinopathy:  proliferative retinopathy in both eyes, seeing optho regularly\par Nephropathy: unknown, last CR 1.14, GFR 76 2022 \par CAD/MI/CVA: yes had MI in 2021\par \par Current DM medications: \par - Humulin 70/30 40 units in the morning and 40 units in the pm 8 pm \par \par Past DM medications: \par - Told to stop Metformin because of fatty liver by his PCP 20 years ago, never had GI issue with metformin \par - Previously on lantus and admelog\par \par Finger sticks:\par 4-6 times per day (from memory, did not bring meter)\par Fastin, 274, 280, 320, 180\par Before lunch: 350, 320, \par Before dinner: 270, 260\par Before bedtime: 240, 250\par \par Diet: \par Wife is cooking now \par Breakfast: spread bread and jelly, oatmeal or eggs \par Lunch: rice with some tafoya \par Dinner: rice/spaghetti/roti, protein and vegetables, tafoya\par Snacks: protein bars, almonds/walnuts/pistachios, sugar free cookies \par Drinks: diet pepsi, water, coffee with no sugar and almond milk, no juice\par Exercise: no exercise. used to exercise lifting weight before MI but now has pain when he does that\par \par \par # HTN\par BP today 126/72\par On losartan 25 mg daily and Metoprolol 25 mg BID\par \par # HLD\par - On rosuvastatin 10 mg daily \par \par #CAD\par - On aspirin and brilinta \par - Denies chest pain or palpitations\par \par  visit events: after having MI, feels unmotivated to do anything. Stopped cooking his meals and stopped exercise. Now wishes to get back on track. \par \par

## 2022-09-02 NOTE — CONSULT LETTER
[Dear  ___] : Dear  [unfilled], [Courtesy Letter:] : I had the pleasure of seeing your patient, [unfilled], in my office today. [Please see my note below.] : Please see my note below. [Consult Closing:] : Thank you very much for allowing me to participate in the care of this patient.  If you have any questions, please do not hesitate to contact me. [Sincerely,] : Sincerely, [FreeTextEntry3] : Brielle Brown MD\par Endocrinology, diabetes and metabolism\par

## 2022-09-02 NOTE — ASSESSMENT
[FreeTextEntry1] : Patient is a 56 M with history of HTN, HL,T2DM, CAD S/p MI in [facility] 12/2021, s/p PCI>pLAD (residual pRCA 70% and Diag 70%), s/p PCI mRCA in , TTE-, EF 30-35% HFrEF here to establish care. Patient is accompanied by his daughter. \par \par 1. Diabetes\par - HgB A1C: check today \par - Complications: CAD, retinopathy\par - Aspirin: yes\par - Most recent urine microalbumin  check today    . ACE-I/ARB: yes losartan 25 mg daily\par - Most recent LDL:   check today  . On statin: yes rosuvastatin 10 mg daily \par - Opthalmology up to date: yes has retinopathy,  advised for yearly check up\par - Podiatry up to date: no advised for yearly check up\par - Patient to call for persistent glucose < 70 or > 300\par - Patient counseled on the importance of consistent carbohydrate diet and regular physical activity \par - Advised patient to continue checking FSG and to bring meter to all visits \par - Symptoms of hypoglycemia discussed\par - Medications changes:\par - Advised changing humulin 70/30 to basal/bolus for better control of glucose. Currently on TDD of 80 units with uncontrolled glucose, thus will increase 20% to 96 units. Conversion to basal/bolus with 80% of that would be 76 units, half of basal would be around 35 units and meal time would be around 12 units\par - Start basaglar 35 units at bed time \par - Start admelog 12 units TID with meals \par - Depending on kidney function, can start metformin +/- SGLT-2 inhibitor (in the setting of HFrEF) +/- GLP-1 agonist (with history of retinopathy, will need slow titration to avoid worsening retinopathy)\par - Patient will also benefit from CGM monitoring due to multiple injections per day and uncontrolled glucose, freestyle teresa sent to patient's pharmacy \par  \par 2. HTN\par - BP today 126/72\par - Continue with Losartan 25 mg daily and Metoprolol 25 mg BID\par  \par 3. HLD\par - Most recent LDL n/a, check lipid panel tdoay \par - Continue with rosuvastatin 10 mg daily \par \par 4. Obesity with BMI 31\par - Discussed lifestyle modification and increasing exercise \par - Potentially can add GLP-1 agonist based on A1C \par \par 5. Vitamin D deficiency \par - Check vitamin D level today \par \par FOLLOW UP: I recommend that next visit for diabetes care be in 3 month\par \par To do at next clinic visit\par - Titrate insulin based on finger stick \par - +/- SGLT-2, or GLP1 agonist \par \par Brielle Brown MD\par Endocrinology, diabetes and metabolism

## 2022-09-07 LAB
CREAT SPEC-SCNC: 303 MG/DL
ESTIMATED AVERAGE GLUCOSE: 283 MG/DL
HBA1C MFR BLD HPLC: 11.5 %
MICROALBUMIN 24H UR DL<=1MG/L-MCNC: 47.3 MG/DL
MICROALBUMIN/CREAT 24H UR-RTO: 156 MG/G

## 2022-09-08 ENCOUNTER — NON-APPOINTMENT (OUTPATIENT)
Age: 57
End: 2022-09-08

## 2022-09-08 ENCOUNTER — APPOINTMENT (OUTPATIENT)
Dept: CARDIOLOGY | Facility: CLINIC | Age: 57
End: 2022-09-08

## 2022-09-08 ENCOUNTER — APPOINTMENT (OUTPATIENT)
Dept: ENDOCRINOLOGY | Facility: CLINIC | Age: 57
End: 2022-09-08

## 2022-09-08 VITALS
OXYGEN SATURATION: 99 % | HEART RATE: 86 BPM | BODY MASS INDEX: 31.22 KG/M2 | HEIGHT: 71 IN | SYSTOLIC BLOOD PRESSURE: 126 MMHG | WEIGHT: 223 LBS | DIASTOLIC BLOOD PRESSURE: 74 MMHG

## 2022-09-08 PROCEDURE — 99215 OFFICE O/P EST HI 40 MIN: CPT

## 2022-09-08 PROCEDURE — 93000 ELECTROCARDIOGRAM COMPLETE: CPT

## 2022-09-08 PROCEDURE — 99211 OFF/OP EST MAY X REQ PHY/QHP: CPT | Mod: 25

## 2022-09-08 PROCEDURE — 95249 CONT GLUC MNTR PT PROV EQP: CPT

## 2022-09-09 RX ORDER — INSULIN LISPRO 100 U/ML
100 INJECTION, SOLUTION SUBCUTANEOUS
Qty: 5 | Refills: 3 | Status: DISCONTINUED | COMMUNITY
Start: 2022-09-02 | End: 2022-09-09

## 2022-09-09 RX ORDER — SEMAGLUTIDE 1.34 MG/ML
2 INJECTION, SOLUTION SUBCUTANEOUS
Qty: 5 | Refills: 3 | Status: DISCONTINUED | COMMUNITY
Start: 2022-09-09 | End: 2022-09-09

## 2022-10-06 ENCOUNTER — NON-APPOINTMENT (OUTPATIENT)
Age: 57
End: 2022-10-06

## 2022-10-17 NOTE — ED ADULT NURSE NOTE - CAS EDN DISCHARGE ASSESSMENT
I let her know that there were no genetic mutations in her genetic testing.  Confirmed.      Alert and oriented to person, place and time

## 2022-12-09 ENCOUNTER — APPOINTMENT (OUTPATIENT)
Dept: ENDOCRINOLOGY | Facility: CLINIC | Age: 57
End: 2022-12-09

## 2022-12-09 VITALS
HEIGHT: 71 IN | SYSTOLIC BLOOD PRESSURE: 120 MMHG | DIASTOLIC BLOOD PRESSURE: 80 MMHG | WEIGHT: 220 LBS | HEART RATE: 87 BPM | OXYGEN SATURATION: 98 % | BODY MASS INDEX: 30.8 KG/M2

## 2022-12-09 DIAGNOSIS — Z86.39 PERSONAL HISTORY OF OTHER ENDOCRINE, NUTRITIONAL AND METABOLIC DISEASE: ICD-10-CM

## 2022-12-09 LAB — HBA1C MFR BLD HPLC: 10.2

## 2022-12-09 PROCEDURE — 95251 CONT GLUC MNTR ANALYSIS I&R: CPT

## 2022-12-09 PROCEDURE — 83036 HEMOGLOBIN GLYCOSYLATED A1C: CPT | Mod: QW

## 2022-12-09 PROCEDURE — 99214 OFFICE O/P EST MOD 30 MIN: CPT | Mod: 25

## 2022-12-09 RX ORDER — TIRZEPATIDE 2.5 MG/.5ML
2.5 INJECTION, SOLUTION SUBCUTANEOUS
Qty: 3 | Refills: 3 | Status: DISCONTINUED | COMMUNITY
Start: 2022-10-06 | End: 2022-12-09

## 2022-12-09 NOTE — ASSESSMENT
[FreeTextEntry1] : Patient is a 56 M with history of HTN, HL,T2DM, CAD S/p MI in [facility] 12/2021, s/p PCI>pLAD (residual pRCA 70% and Diag 70%), s/p PCI mRCA in , TTE-, EF 30-35% HFrEF here to establish care. Patient is accompanied by his daughter. \par \par 1. Diabetes\par - HgB A1C: 10.2 (improved from 11.6 last visit), we discussed improvement, but not yet reached goal of 7%\par - Complications: CAD, retinopathy\par - Aspirin: yes\par - Most recent urine microalbumin 156 09/2022   . ACE-I/ARB: yes losartan 25 mg daily\par - Most recent LDL: 91  . On statin: yes rosuvastatin 10 mg daily \par - Opthalmology up to date: yes has retinopathy,  advised for yearly check up\par - Podiatry up to date: no advised for yearly check up\par - Patient to call for persistent glucose < 70 or > 300\par - Patient counseled on the importance of consistent carbohydrate diet and regular physical activity \par - Advised patient to continue checking FSG and to bring meter to all visits \par - Symptoms of hypoglycemia discussed\par - Medications changes:\par - Patient said his glucose was high on basaglar, thus he switched back to humulin. I advised that humulin has mixed insulin which contains fast acting, it may be potentially dangerous if he is taking both humulin and humalog. \par - We discussed to restart Basaglar 45 units QHS and continue with Humalog 15 units TID with meals. \par - Start Trulicity 0.75 mg weekly (with history of retinopathy, will need slow titration to avoid worsening retinopathy) and Jardiance 10 mg daily (beneficial for history of CHF)\par - We discussed that both Trulicity and Jardiance have potential benefits for patients with clinical ASCVD as well as reduced the risk of cardiovascular mortality in addition to weight loss and improvement in glycemic control.  I discussed side effects of the GLP-1 agonist including pancreatitis, nausea or vomiting, injection site reactions and benefits including potential for weight loss and glycemic benefits. Patient confirmed no family history of thyroid cancer and no pancreatitis.   \par - Encourage the continued use of CGM\par - Check back in 2 weeks after DM regiman modification as above \par  \par 2. HTN\par - BP today 126/72\par - Continue with Losartan 25 mg daily and Metoprolol 25 mg BID\par  \par 3. HLD\par - Most recent LDL 91, goal should  be <50-70 in the setting of history of MI\par - Continue with rosuvastatin 10 mg QOD due to history of statin intolerance \par - Managing by Dr. Kay from cardiology, may potentially benefit from adding Ezetimbie as it can lower LDL by 17-20%\par \par 4. Obesity with BMI 31\par - Discussed lifestyle modification and increasing exercise \par - Add GLP-1 agonist as described above \par \par FOLLOW UP: I recommend that next visit for diabetes care be in 3 month\par \par To do at next clinic visit\par - Titrate insulin based on finger stick \par - SGLT-2, or GLP1 agonist titration \par \par Brielle Brown MD\par Endocrinology, diabetes and metabolism

## 2022-12-09 NOTE — HISTORY OF PRESENT ILLNESS
[FreeTextEntry1] : Patient is a 56 M with history of HTN, HL,T2DM, CAD S/p MI in [facility] 2021, s/p PCI>pLAD (residual pRCA 70% and Diag 70%), s/p PCI mRCA in , TTE-, EF 30-35% HFrEF here to establish care. Patient is accompanied by his daughter. \par \par FH: mother and siblings have diabetes\par SH: smokes weed, not cigarettes, does not drink alcohol \par Retired , lives with his wife who does most of the cooking\par \par # T2DM:\par Diabetes history:\par Diagnosed when he was 25 years old. Diagnosed with T2D when he went to his PCP, had blurry vision, polyuria, polydipsia. Has been on insulin for 30 years. \par \par Most recent A1C 11.5 2022-->10.2 2022. 291 glucose today \par \par Diabetes complications:\par Neuropathy: occasional numbness in finger tips\par Retinopathy:  proliferative retinopathy in both eyes, seeing optho regularly\par Nephropathy: unknown, last CR 1.14, GFR 76 2022 \par CAD/MI/CVA: yes had MI in 2021\par \par Current DM medications: \par - Humulin 70/30 45 units in the morning and 45 units in the pm 8 pm \par - Humalog 15 units before each meal \par - Never started on jardiance or trulicity \par \par Past DM medications: \par - Told to stop Metformin because of fatty liver by his PCP 20 years ago, never had GI issue with metformin \par - Previously on lantus and admelog\par \par Finger sticks:\par 4-6 times per day (from memory, did not bring meter)\par Fastin, 220, 248, 172, 229, 232, 210\par Before lunch: 230, 229, 211, 147\par Before dinner: 135, 215, 208, 159\par Before bedtime: 201, 211, 196\par Reason for Professional CGM Referral: ***\par CGM interpretation for dates:  -\par TIR 18% \par High 53%\par Very high 29 %\par Low 0%\par Very low 0%\par Glucose variabiltiy 21.4%\par GMI 8.7%\par Average glucose: 225\par Pattern: fasting hyperglycemia and postprandial hyperglycemia \par \par Diet: \par Wife is cooking now \par Breakfast: spread bread and jelly, oatmeal or eggs \par Lunch: rice with some tafoya \par Dinner: rice/spaghetti/roti, protein and vegetables, tafoya\par Snacks: protein bars, almonds/walnuts/pistachios, sugar free cookies. \par Drinks: diet pepsi, water, coffee with no sugar and almond milk, no juice\par Exercise: no exercise. used to exercise lifting weight before MI but now has pain when he does that\par \par \par # HTN\par BP today 126/72\par On losartan 25 mg daily and Metoprolol 25 mg BID\par \par # HLD\par - On rosuvastatin 10 mg daily \par \par #CAD\par - On aspirin and brilinta \par - Denies chest pain or palpitations\par \par  visit events: after having MI, feels unmotivated to do anything. Stopped cooking his meals and stopped exercise. Now wishes to get back on track. \par \par  visit events: eats dinner around 8-9 pm every day. Patient lost a little weight since last visit. He says for [place] and world cup, he has been snacking a lot mostly on chocolates, cakes and other junk food. Switched to humulin because he feels like basaglar is not working for him. \par

## 2022-12-12 ENCOUNTER — APPOINTMENT (OUTPATIENT)
Dept: CARDIOLOGY | Facility: CLINIC | Age: 57
End: 2022-12-12
Payer: COMMERCIAL

## 2022-12-12 ENCOUNTER — NON-APPOINTMENT (OUTPATIENT)
Age: 57
End: 2022-12-12

## 2022-12-12 VITALS
OXYGEN SATURATION: 97 % | WEIGHT: 220 LBS | HEIGHT: 71 IN | DIASTOLIC BLOOD PRESSURE: 84 MMHG | RESPIRATION RATE: 16 BRPM | BODY MASS INDEX: 30.8 KG/M2 | SYSTOLIC BLOOD PRESSURE: 124 MMHG | HEART RATE: 96 BPM

## 2022-12-12 PROCEDURE — 99215 OFFICE O/P EST HI 40 MIN: CPT | Mod: 25

## 2022-12-12 PROCEDURE — 93000 ELECTROCARDIOGRAM COMPLETE: CPT

## 2022-12-12 PROCEDURE — 99402 PREV MED CNSL INDIV APPRX 30: CPT

## 2022-12-13 ENCOUNTER — NON-APPOINTMENT (OUTPATIENT)
Age: 57
End: 2022-12-13

## 2022-12-16 ENCOUNTER — NON-APPOINTMENT (OUTPATIENT)
Age: 57
End: 2022-12-16

## 2022-12-20 ENCOUNTER — OUTPATIENT (OUTPATIENT)
Dept: OUTPATIENT SERVICES | Facility: HOSPITAL | Age: 57
LOS: 1 days | End: 2022-12-20
Payer: COMMERCIAL

## 2022-12-20 ENCOUNTER — TRANSCRIPTION ENCOUNTER (OUTPATIENT)
Age: 57
End: 2022-12-20

## 2022-12-20 VITALS
OXYGEN SATURATION: 100 % | HEIGHT: 71 IN | TEMPERATURE: 99 F | DIASTOLIC BLOOD PRESSURE: 83 MMHG | RESPIRATION RATE: 16 BRPM | SYSTOLIC BLOOD PRESSURE: 149 MMHG | WEIGHT: 229.94 LBS | HEART RATE: 94 BPM

## 2022-12-20 VITALS
OXYGEN SATURATION: 99 % | DIASTOLIC BLOOD PRESSURE: 72 MMHG | HEART RATE: 89 BPM | SYSTOLIC BLOOD PRESSURE: 112 MMHG | RESPIRATION RATE: 18 BRPM

## 2022-12-20 DIAGNOSIS — Z95.5 PRESENCE OF CORONARY ANGIOPLASTY IMPLANT AND GRAFT: Chronic | ICD-10-CM

## 2022-12-20 DIAGNOSIS — R07.89 OTHER CHEST PAIN: ICD-10-CM

## 2022-12-20 LAB
ALBUMIN SERPL ELPH-MCNC: 4.3 G/DL — SIGNIFICANT CHANGE UP (ref 3.3–5)
ALP SERPL-CCNC: 93 U/L — SIGNIFICANT CHANGE UP (ref 40–120)
ALT FLD-CCNC: 24 U/L — SIGNIFICANT CHANGE UP (ref 10–45)
ANION GAP SERPL CALC-SCNC: 15 MMOL/L — SIGNIFICANT CHANGE UP (ref 5–17)
AST SERPL-CCNC: 17 U/L — SIGNIFICANT CHANGE UP (ref 10–40)
BILIRUB SERPL-MCNC: 0.6 MG/DL — SIGNIFICANT CHANGE UP (ref 0.2–1.2)
BUN SERPL-MCNC: 16 MG/DL — SIGNIFICANT CHANGE UP (ref 7–23)
CALCIUM SERPL-MCNC: 10 MG/DL — SIGNIFICANT CHANGE UP (ref 8.4–10.5)
CHLORIDE SERPL-SCNC: 99 MMOL/L — SIGNIFICANT CHANGE UP (ref 96–108)
CO2 SERPL-SCNC: 21 MMOL/L — LOW (ref 22–31)
CREAT SERPL-MCNC: 1.18 MG/DL — SIGNIFICANT CHANGE UP (ref 0.5–1.3)
EGFR: 72 ML/MIN/1.73M2 — SIGNIFICANT CHANGE UP
GLUCOSE BLDC GLUCOMTR-MCNC: 201 MG/DL — HIGH (ref 70–99)
GLUCOSE SERPL-MCNC: 211 MG/DL — HIGH (ref 70–99)
HCT VFR BLD CALC: 47.5 % — SIGNIFICANT CHANGE UP (ref 39–50)
HGB BLD-MCNC: 15.4 G/DL — SIGNIFICANT CHANGE UP (ref 13–17)
MCHC RBC-ENTMCNC: 25.2 PG — LOW (ref 27–34)
MCHC RBC-ENTMCNC: 32.4 GM/DL — SIGNIFICANT CHANGE UP (ref 32–36)
MCV RBC AUTO: 77.6 FL — LOW (ref 80–100)
NRBC # BLD: 0 /100 WBCS — SIGNIFICANT CHANGE UP (ref 0–0)
PLATELET # BLD AUTO: 274 K/UL — SIGNIFICANT CHANGE UP (ref 150–400)
POTASSIUM SERPL-MCNC: 4.1 MMOL/L — SIGNIFICANT CHANGE UP (ref 3.5–5.3)
POTASSIUM SERPL-SCNC: 4.1 MMOL/L — SIGNIFICANT CHANGE UP (ref 3.5–5.3)
PROT SERPL-MCNC: 7.5 G/DL — SIGNIFICANT CHANGE UP (ref 6–8.3)
RBC # BLD: 6.12 M/UL — HIGH (ref 4.2–5.8)
RBC # FLD: 14.5 % — SIGNIFICANT CHANGE UP (ref 10.3–14.5)
SODIUM SERPL-SCNC: 135 MMOL/L — SIGNIFICANT CHANGE UP (ref 135–145)
WBC # BLD: 9.93 K/UL — SIGNIFICANT CHANGE UP (ref 3.8–10.5)
WBC # FLD AUTO: 9.93 K/UL — SIGNIFICANT CHANGE UP (ref 3.8–10.5)

## 2022-12-20 PROCEDURE — 93010 ELECTROCARDIOGRAM REPORT: CPT

## 2022-12-20 PROCEDURE — 93458 L HRT ARTERY/VENTRICLE ANGIO: CPT | Mod: 26

## 2022-12-20 PROCEDURE — C1769: CPT

## 2022-12-20 PROCEDURE — 99152 MOD SED SAME PHYS/QHP 5/>YRS: CPT

## 2022-12-20 PROCEDURE — 93005 ELECTROCARDIOGRAM TRACING: CPT

## 2022-12-20 PROCEDURE — 93458 L HRT ARTERY/VENTRICLE ANGIO: CPT

## 2022-12-20 PROCEDURE — C1894: CPT

## 2022-12-20 PROCEDURE — 82962 GLUCOSE BLOOD TEST: CPT

## 2022-12-20 PROCEDURE — 85027 COMPLETE CBC AUTOMATED: CPT

## 2022-12-20 PROCEDURE — 36415 COLL VENOUS BLD VENIPUNCTURE: CPT

## 2022-12-20 PROCEDURE — C1887: CPT

## 2022-12-20 PROCEDURE — 80053 COMPREHEN METABOLIC PANEL: CPT

## 2022-12-20 RX ORDER — DULAGLUTIDE 4.5 MG/.5ML
0 INJECTION, SOLUTION SUBCUTANEOUS
Qty: 0 | Refills: 0 | DISCHARGE

## 2022-12-20 RX ORDER — ROSUVASTATIN CALCIUM 5 MG/1
1 TABLET ORAL
Qty: 0 | Refills: 0 | DISCHARGE

## 2022-12-20 RX ORDER — INSULIN NPH HUM/REG INSULIN HM 70-30/ML
35 VIAL (ML) SUBCUTANEOUS
Qty: 0 | Refills: 0 | DISCHARGE

## 2022-12-20 RX ORDER — METOPROLOL TARTRATE 50 MG
1 TABLET ORAL
Qty: 0 | Refills: 0 | DISCHARGE

## 2022-12-20 NOTE — ASU DISCHARGE PLAN (ADULT/PEDIATRIC) - ASU DC SPECIAL INSTRUCTIONSFT

## 2022-12-20 NOTE — ASU DISCHARGE PLAN (ADULT/PEDIATRIC) - NS MD DC FALL RISK RISK
For information on Fall & Injury Prevention, visit: [email or web address] OR  [email or web address] OR  https://www.cdc.gov/steadi/patient.html

## 2022-12-20 NOTE — ASU PATIENT PROFILE, ADULT - NS SC CAGE ALCOHOL ANNOYED YOU
Pt Name: Devon Oseguera  MRN: [id number]  YOB: 1977  Date of evaluation: 4/5/2021    I have reviewed the patient's history and physical examination completed in pre-admission testing.     Changes to history or on examination, if any, are as follows:  NONE    LAMONTE PARIS PA-C  4/5/21  8:27 AM
no
(0) age 40 years or less

## 2022-12-20 NOTE — H&P CARDIOLOGY - HISTORY OF PRESENT ILLNESS
58 y/o [place] Male with PMH DMT2 (uncontrolled, Hgba1c 10.2 ,pt follows PMD Dr Fuller), HTN, HLD, STEMI, CAD/DESx1 placed in pLAD 12/22/21, s/p mRCA and LAD stent in 3/2022 presents today for [facility].  Pt reports chest pain for past few months, was seen by Dr Vidales and referred for [facility]. Denies any implanted cardiac device.    < from: Nuclear Stress Test-Pharmacologic (02.22.22 @ 11:41) >  IMPRESSIONS: Abnormal Study  * Chest Pain: No chest pain with administration of  Regadenoson.  * Symptom: Nausea.  * HR Response: Appropriate.  * BP Response: Appropriate.  * Heart Rhythm: Sinus Rhythm - 75 BPM.  * Baseline ECG: T wave abnormality, possible anterolateral ischemia.  * ECG Changes: No significant ischemic ST segment changes beyond baseline abnormalities.  * Arrhythmia: Rare VPDs occurred during stress.  * The left ventricle was normal in size.  * There are medium-sized, mild defects in the basal to mid anterior and basal to mid anterolateral walls that are reversible suggestive of ischemia.  * There are large, moderate to severe defects in the anteroseptal, distal anterior, and apical walls that are partially reversible suggestive of infarct with moderate  deborah-infarct ischemia.  * There are large, mild to moderate defects in the inferior, inferolateral, and basal inferoseptal walls that are mostly reversible suggestive of infarct with  significant deborah-infarct ischemia.  * The left ventricular cavity appears to dilate with stress with a quantified TID value of 1.25 which is abnormal for this protocol.  * Post-stress gated wall motion analysis was performed (LVEF = 43 %;LVEDV = 119 ml.) revealing hypokinesis of the anteroseptal, distal anterior, apical, basal inferior, and basal inferoseptal walls and reduced systolic thickening of the basal to mid anterior, basal to mid anterolateral, mid to distal inferior, and mid to distal inferolateral walls.      < from: Cardiac Catheterization (03.01.22 @ 10:37) >  Interventional Findings:     Interventional Details   Mid right coronary artery: The initial stenosis was 80 %. Guidewire  crossing was successful.    A successful Drug Eluting Stent was deployed using a 6FR JR 4.0  LAUNCHER, a VIANCA [id number], and a [employer]  3.5 X 40MM.      The inflation pressure was 18 jaquelin for the duration of 10.0 seconds.     A successful Balloon angioplasty was performed using a 3.75 X 20  EUPHORA NC.    The inflation pressure was 22 jaquelin for the duration of 6.0 seconds.     The inflation pressure was 20 jaquelin for the duration of 8.0 seconds.     Following intervention there is a 1 % residual stenosis. There was  DEBORAH Flow 3 before the procedure and DEBORAH Flow 3 following the  procedure. No significant vessel dissection noted.      < end of copied text >   58 y/o [place] Male with PMH DMT2 (uncontrolled, Hgba1c 10.2 pt follows PMD Dr Fuller), HTN, HLD, NSTEMI, CAD/DESx1 placed in pLAD 12/22/21, s/p mRCA and LAD stent in 3/2022 presents today for [facility].  Pt reports chest pain for past few months, was seen by Dr Vidales and referred for [facility]. Denies any implanted cardiac device.    < from: Nuclear Stress Test-Pharmacologic (02.22.22 @ 11:41) >  IMPRESSIONS: Abnormal Study  * Chest Pain: No chest pain with administration of  Regadenoson.  * Symptom: Nausea.  * HR Response: Appropriate.  * BP Response: Appropriate.  * Heart Rhythm: Sinus Rhythm - 75 BPM.  * Baseline ECG: T wave abnormality, possible anterolateral ischemia.  * ECG Changes: No significant ischemic ST segment changes beyond baseline abnormalities.  * Arrhythmia: Rare VPDs occurred during stress.  * The left ventricle was normal in size.  * There are medium-sized, mild defects in the basal to mid anterior and basal to mid anterolateral walls that are reversible suggestive of ischemia.  * There are large, moderate to severe defects in the anteroseptal, distal anterior, and apical walls that are partially reversible suggestive of infarct with moderate  deborah-infarct ischemia.  * There are large, mild to moderate defects in the inferior, inferolateral, and basal inferoseptal walls that are mostly reversible suggestive of infarct with  significant deborah-infarct ischemia.  * The left ventricular cavity appears to dilate with stress with a quantified TID value of 1.25 which is abnormal for this protocol.  * Post-stress gated wall motion analysis was performed (LVEF = 43 %;LVEDV = 119 ml.) revealing hypokinesis of the anteroseptal, distal anterior, apical, basal inferior, and basal inferoseptal walls and reduced systolic thickening of the basal to mid anterior, basal to mid anterolateral, mid to distal inferior, and mid to distal inferolateral walls.      < from: Cardiac Catheterization (03.01.22 @ 10:37) >  Interventional Findings:     Interventional Details   Mid right coronary artery: The initial stenosis was 80 %. Guidewire  crossing was successful.    A successful Drug Eluting Stent was deployed using a 6FR JR 4.0  LAUNCHER, a VIANCA [id number], and a [employer]  3.5 X 40MM.      The inflation pressure was 18 jaquelin for the duration of 10.0 seconds.     A successful Balloon angioplasty was performed using a 3.75 X 20  EUPHORA NC.    The inflation pressure was 22 jaquelin for the duration of 6.0 seconds.     The inflation pressure was 20 jaquelin for the duration of 8.0 seconds.     Following intervention there is a 1 % residual stenosis. There was  DEBORAH Flow 3 before the procedure and DEBORAH Flow 3 following the  procedure. No significant vessel dissection noted.      < end of copied text >

## 2023-01-09 ENCOUNTER — APPOINTMENT (OUTPATIENT)
Dept: CARDIOLOGY | Facility: CLINIC | Age: 58
End: 2023-01-09

## 2023-01-10 ENCOUNTER — APPOINTMENT (OUTPATIENT)
Dept: CARDIOLOGY | Facility: CLINIC | Age: 58
End: 2023-01-10

## 2023-01-23 ENCOUNTER — APPOINTMENT (OUTPATIENT)
Dept: CARDIOLOGY | Facility: CLINIC | Age: 58
End: 2023-01-23
Payer: COMMERCIAL

## 2023-01-23 VITALS
HEART RATE: 66 BPM | RESPIRATION RATE: 16 BRPM | WEIGHT: 225 LBS | OXYGEN SATURATION: 98 % | BODY MASS INDEX: 31.5 KG/M2 | SYSTOLIC BLOOD PRESSURE: 116 MMHG | DIASTOLIC BLOOD PRESSURE: 78 MMHG | HEIGHT: 71 IN

## 2023-01-23 PROCEDURE — 99402 PREV MED CNSL INDIV APPRX 30: CPT

## 2023-01-23 PROCEDURE — 99215 OFFICE O/P EST HI 40 MIN: CPT | Mod: 25

## 2023-02-10 ENCOUNTER — RX RENEWAL (OUTPATIENT)
Age: 58
End: 2023-02-10

## 2023-02-21 ENCOUNTER — APPOINTMENT (OUTPATIENT)
Dept: CARDIOLOGY | Facility: CLINIC | Age: 58
End: 2023-02-21

## 2023-02-21 ENCOUNTER — NON-APPOINTMENT (OUTPATIENT)
Age: 58
End: 2023-02-21

## 2023-02-22 ENCOUNTER — NON-APPOINTMENT (OUTPATIENT)
Age: 58
End: 2023-02-22

## 2023-02-22 NOTE — REASON FOR VISIT
[Home] : at home, [unfilled] , at the time of the visit. [Medical Office: ([facility])___] : at the medical office located in  [Other:____] : [unfilled] [Verbal consent obtained from patient] : the patient, [unfilled] [FreeTextEntry1] : CR Intake.  Clinical Indication: s/p PCI. Dr. Kay to review and manually sign ITP.  ITP will; be finalized at session #1.

## 2023-02-22 NOTE — HISTORY OF PRESENT ILLNESS
[Yes ___ How many times per day?] : Yes, [unfilled] times per day [Is Patient aware of signs and symptoms of hypoglycemia and hyperglycemia?] : patient is aware of signs and symptoms of hypoglycemia and hyperglycemia [Is Patient adherent with medication?] : patient is adherent with medication [Does patient monitor blood pressure at home?] : patient monitors blood pressure at home [Low sodium diet] : low sodium diet [Moderate alcohol intake] : moderate alcohol intake [Patient will verbalize factors contributing to improved blood pressure management] : Patient will verbalize factors contributing to improved blood pressure management [Yes, continue with Hypertension plan] : Yes, continue with Hypertension plan [Diabetes Mellitus] : diabetes mellitus [Sedentary] : sedentary [Assess in ___ weeks] : assess in [unfilled] weeks [Perform aerobic activity for ___ consecutive minutes] : perform aerobic activity for [unfilled] consecutive minutes [To return to my previous hobbies and activities] : to return to my previous hobbies and activities  [Equipment Orientation/Safety] : equipment orientation/safety [Exercise Benefits/Guidelines education] : exercise benefits/guidelines education [Yes, per exercise prescription, policy] : Yes, per exercise prescription, policy [Has the patient given CR team permission to speak with the emergency  about the patient?] : Has the patient given CR team permission to speak with the emergency  about the patient? Yes [Patient will include healthy emotional coping practices in everyday life, such as: ____] : Patient will include healthy emotional coping practices in everyday life, such as [unfilled] [Meditation] : meditation [Self-reports of improved psychosocial well-being] : Self-reports of improved psychosocial well-being [Stress management] : stress management [Yes, continue with psychosocial plan] : Yes, continue with psychosocial plan [Atorvastatin] : atorvastatin [Significant other] : significant other [Self-reports of improved health eating patterns] : Self-reports of improved health eating patterns [MyPlate.gov] : MyPlate.gov [Medication Adherence] : medication adherence [Home monitoring of blood sugar] : home monitoring of blood sugar [Following diabetes way of eating] : following diabetes way of eating [Healthy weight management] : healthy weight management [Patient will verbalize sign and symptoms of low blood sugar and high blood sugar] : Patient will verbalize sign and symptoms of low blood sugar and high blood sugar [Individualized exercise program as developed at cardiac rehabilitation] : individualized exercise program as developed at cardiac rehabilitation [Diagnosis of prediabetes, diabetes] : diagnosis of prediabetes, diabetes [Yes, continue with Diabetes plan] : Yes, continue with Diabetes plan [Patient will maintain blood pressure < 130/80 mmHg] : patient will maintain blood pressure < 130/80 mmHg [Home monitoring of blood pressure] : home monitoring of blood pressure [Diet changes including (healthy heart eating, less processed foods, low sodium diet)] : Diet changes including (healthy heart eating, less processed foods, low sodium diet) [Healthy weight (management maintenance)] : Healthy weight (management maintenance) [Reduce or discontinue alcohol intake] : Reduce or discontinue alcohol intake [Halt the salt] : halt the salt [Pre-contemplation] : Stage of change: pre-contemplation [Present smoker] : present smoker [Patient will demonstrate readiness to change by expressing decision to quit] : Patient will demonstrate readiness to change by expressing decision to quit [Discussed overview of risks of continued use] : overview of risks of continued use [Assess patient's relevance of quitting] : assess patient's relevance of quitting [Assist patient with community resources to quitting and provide printed education material] : assist patient with community resources to quitting and provide printed education material [Provide patient with contact information and brochure for [facility] ([facility])] : provide patient with contact information and brochure for [facility] ([facility]) [Yes, continue with Smoking/Tobacco Cession plan] : Yes, continue with Smoking/Tobacco Cession plan [Preparation/Planning] : Stage of change: preparation/planning [None] : none [PCI/Stent] : PCI/stent [Chronic stable angina] : chronic stable angina [HLD] : HLD [Smoking] : smoking [Other restrictions: ____] : [unfilled] [Treadmill] : treadmill [Recumbent bike] : recumbent bike [BioStep] : BioStep [Walking] : walking [Stretching/ROM exercises and balance exercises daily] : Stretching/ROM exercises and balance exercises daily [Will assess for musculoskeletal and other restrictions] : will assess for musculoskeletal and other restrictions [Individualized Exercise Rx] : individualized exercise Rx [Welcome packet provided] : welcome packet provided [Hypertension] : Hypertension [Diabetes] : Diabetes [Sugar] : sugar [Sodium] : sodium [Cholesterol] : cholesterol [Trans fats/saturated fats] : trans fats/saturated fats [] : yes [TC: ___] : TC: [unfilled] [HDL: ___] : HDL: [unfilled] [LDL: ___] : LDL: [unfilled] [Patient is interested in seeing a registered dietitian?] : Patient is interested in seeing a registered dietitian? Yes [Patient will include healthy diet pattern approaches to healthy eating] : Patient will include healthy diet pattern approaches to healthy eating [Discuss an overview of healthy eating plan] : Discuss an overview of healthy eating plan [Yes, continue with nutrition plan] : Yes, continue with nutrition plan [In progress] : in progress [Type II Diabetes] : Type II Diabetes [FreeTextEntry4] : Metoprolol and Losartan [FreeTextEntry5] : Renzo Fuller [de-identified] : Pt. will tolerate introduced exercise.  [de-identified] : Travel and daily walks [FreeTextEntry6] : Pt. reports doing "okay", daughter is very supportive.  [FreeTextEntry9] : Improved PHQ-9 score [Patient has seen registered dietitian in the past?] : Patient has seen registered dietitian in the past? No [FreeTextEntry1] : 9/2/2022 [FreeTextEntry8] : Improved PHQ-9 score\par Increase whole grain intake to 2-3 times per week\par Increase vegetable intake to 2-3 times per week\par Decrease red meat intake to 0-1 time per week [FreeTextEntry7] : Pt. states he does not follow a DASH/ADA diet. Expressed interest in seeing a dietitian, Katherin Broussard RD card will be mailed in welcome packet.

## 2023-03-03 ENCOUNTER — APPOINTMENT (OUTPATIENT)
Dept: ENDOCRINOLOGY | Facility: CLINIC | Age: 58
End: 2023-03-03
Payer: COMMERCIAL

## 2023-03-03 VITALS
BODY MASS INDEX: 30.8 KG/M2 | OXYGEN SATURATION: 98 % | HEIGHT: 71 IN | DIASTOLIC BLOOD PRESSURE: 80 MMHG | WEIGHT: 220 LBS | HEART RATE: 98 BPM | SYSTOLIC BLOOD PRESSURE: 118 MMHG

## 2023-03-03 PROCEDURE — 99214 OFFICE O/P EST MOD 30 MIN: CPT | Mod: 25

## 2023-03-03 PROCEDURE — 95251 CONT GLUC MNTR ANALYSIS I&R: CPT

## 2023-03-03 NOTE — ASSESSMENT
[FreeTextEntry1] : Patient is a 57 M with history of HTN, HL,T2DM, CAD S/p MI in [facility] 12/2021, s/p PCI>pLAD (residual pRCA 70% and Diag 70%), s/p PCI mRCA in , TTE-, EF 30-35% HFrEF here for follow up. Patient is accompanied by his daughter. \par \par 1. T2D complicated by CAD, retinopathy \par - HgB A1C: 10.2 (improved from 11.6 last visit), we discussed improvement, but not yet reached goal of 7%\par - Complications: CAD, retinopathy\par - Aspirin: yes\par - Most recent urine microalbumin 156 09/2022   . ACE-I/ARB: yes losartan 25 mg daily and Jardiance 10 mg daily \par - Most recent LDL: 91  . On statin: yes rosuvastatin 10 mg daily, cardiology started PCSK9 inhibitor \par - Opthalmology up to date: yes has retinopathy,  advised for yearly check up\par - Podiatry up to date: no advised for yearly check up\par - Patient to call for persistent glucose < 70 or > 300\par - Patient counseled on the importance of consistent carbohydrate diet and regular physical activity \par - Advised patient to continue checking FSG and to bring meter to all visits \par - Symptoms of hypoglycemia discussed\par - Medications changes:\par - Overall glucose has improved, but not at goal. Based on his CGM report, he appears to have fasting hyperglycemia which could be related to him having his large dinner too late at night. We discussed moving dinner time close to 6-7 pm. Continue with Basaglar 30 units at bed time for now. \par - In terms of prandial glucose, humalog 30 units usually maintain his glucose, thus would continue. Lunch humalog 20 units and dinner humalog 35 units \par - Increase  Trulicity to 1.5 mg weekly (with history of retinopathy, will need slow titration to avoid worsening retinopathy)\par - Continue with Jardiance 10 mg daily (beneficial for history of CHF)\par - Encourage the continued use of CGM \par  \par 2. HTN\par - BP today 118/80\par - Continue with Losartan 25 mg daily and Metoprolol 25 mg BID\par  \par 3. HLD\par - Most recent LDL 91, goal should  be <50-70 in the setting of history of MI\par - Continue with rosuvastatin 10 mg QOD due to history of statin intolerance \par - Managing by Dr. Kay from cardiology, starting on PCSK9 inhibitor due to high ASCVD risk \par \par 4. Obesity with BMI 30\par - Mild weight loss from low dose Trulicity \par - Discussed lifestyle modification and increasing exercise, referral to CDE for diet guidance \par - Increase Trulicity  as described above \par \par FOLLOW UP: I recommend that next visit for diabetes care be in 4 month\par \par To do at next clinic visit\par - Titrate insulin based on finger stick \par - SGLT-2, or GLP1 agonist titration \par \par Brielle Brown MD\par Endocrinology, diabetes and metabolism

## 2023-03-03 NOTE — HISTORY OF PRESENT ILLNESS
[FreeTextEntry1] : Patient is a 57 M with history of HTN, HL,T2DM, CAD S/p MI in [facility] 12/2021, s/p PCI>pLAD (residual pRCA 70% and Diag 70%), s/p PCI mRCA in , TTE-, EF 30-35% HFrEF here for follow up. Last visit 12/9/2022. Patient is accompanied by his daughter. \par \par FH: mother and siblings have diabetes\par SH: smokes weed, not cigarettes, does not drink alcohol \par Retired , lives with his wife who does most of the cooking\par \par # T2DM:\par Diabetes history:\par Diagnosed when he was 25 years old. Diagnosed with T2D when he went to his PCP, had blurry vision, polyuria, polydipsia. Has been on insulin for 30 years. \par \par Most recent A1C 11.5 09/2022-->10.2 12/9/2022 \par \par Diabetes complications:\par Neuropathy: occasional numbness in finger tips\par Retinopathy:  proliferative retinopathy in both eyes, seeing optho regularly, got injection in both eyes \par Nephropathy: unknown, last CR 1.14, GFR 76 03/2022 \par CAD/MI/CVA: yes had MI in 12/2021\par \par Current DM medications: \par - Basaglar 30 units QHS\par - Trulicity 0.75  weekly (sundays)\par - Humalog 30 units before each meal. Most often skip lunch doses because he does not eat a big lunch\par - Jardiance 10 mg daily \par \par Past DM medications: \par - Told to stop Metformin because of fatty liver by his PCP 20 years ago, never had GI issue with metformin \par - Previously on lantus and admelog\par \par Finger sticks:\par CGM interpretation for dates: 2/18/2023-3/3/2023\par TIR 23% \par High 58%\par Very high 19 %\par Low 0%\par Very low 0%\par Glucose variabiltiy 23.2%\par GMI 8.3%\par Average glucose: 210\par Pattern: fasting hyperglycemia and postprandial hyperglycemia with dinner. Postprandial glucose with breakfast usually stable. Postprandial glucose with lunch usually drops to 80s. \par \par Diet: \par Wife is cooking now but patient feels that when daughter is around, he usually has better diet \par Breakfast 8-9 am: spread bread and jelly, oatmeal or eggs \par Lunch 4-5 pm: rice with some tafoya, sometimes skips\par Dinner 9-10 pm: rice/spaghetti/roti, protein and vegetables, tafoya\par Snacks: protein bars, almonds/walnuts/pistachios, sugar free cookies. \par Drinks: diet pepsi, water, coffee with no sugar and almond milk, no juice\par Exercise: no exercise. used to exercise lifting weight before MI but now has pain when he does that\par \par \par # HTN\par BP today 118/80\par On losartan 25 mg daily and Metoprolol 25 mg BID\par \par # HLD\par - LDL 91 09/2022\par - On rosuvastatin 10 mg daily \par - Started PCSK9 inhibitor by cardiology \par \par #CAD\par - On aspirin and brilinta \par - Denies chest pain or palpitations\par \par 9/2/2022 visit events: after having MI, feels unmotivated to do anything. Stopped cooking his meals and stopped exercise. Now wishes to get back on track. \par \par 12/9/2022 visit events: eats dinner around 8-9 pm every day. Patient lost a little weight since last visit. He says for [place] and world cup, he has been snacking a lot mostly on chocolates, cakes and other junk food. Switched to humulin because he feels like basaglar is not working for him. \par \par 3/3/2023 visit events: overall feeling better. Lost about 5 pounds since last time. Feels like basaglar does not work for him because he wakes up with fasting glucose in the 200s. He usually eats a very late dinner around 10 pm (and dinner is a decent size meal). Occasional would eat a cake for snack at bed time. Lunch is usually small and is around 3-4 pm. \par

## 2023-03-09 ENCOUNTER — APPOINTMENT (OUTPATIENT)
Dept: CARDIOLOGY | Facility: CLINIC | Age: 58
End: 2023-03-09
Payer: COMMERCIAL

## 2023-03-09 ENCOUNTER — NON-APPOINTMENT (OUTPATIENT)
Age: 58
End: 2023-03-09

## 2023-03-09 VITALS
HEART RATE: 103 BPM | DIASTOLIC BLOOD PRESSURE: 80 MMHG | SYSTOLIC BLOOD PRESSURE: 118 MMHG | BODY MASS INDEX: 30.94 KG/M2 | HEIGHT: 71 IN | WEIGHT: 221 LBS | OXYGEN SATURATION: 99 %

## 2023-03-09 PROCEDURE — 93000 ELECTROCARDIOGRAM COMPLETE: CPT

## 2023-03-09 PROCEDURE — 99215 OFFICE O/P EST HI 40 MIN: CPT

## 2023-03-16 ENCOUNTER — APPOINTMENT (OUTPATIENT)
Dept: CARDIOLOGY | Facility: CLINIC | Age: 58
End: 2023-03-16
Payer: COMMERCIAL

## 2023-03-16 PROCEDURE — A9500: CPT

## 2023-03-16 PROCEDURE — 93015 CV STRESS TEST SUPVJ I&R: CPT

## 2023-03-16 PROCEDURE — 78452 HT MUSCLE IMAGE SPECT MULT: CPT

## 2023-03-23 ENCOUNTER — RX RENEWAL (OUTPATIENT)
Age: 58
End: 2023-03-23

## 2023-04-03 ENCOUNTER — APPOINTMENT (OUTPATIENT)
Dept: CARDIOLOGY | Facility: CLINIC | Age: 58
End: 2023-04-03
Payer: COMMERCIAL

## 2023-04-03 ENCOUNTER — NON-APPOINTMENT (OUTPATIENT)
Age: 58
End: 2023-04-03

## 2023-04-03 PROCEDURE — 93798 PHYS/QHP OP CAR RHAB W/ECG: CPT

## 2023-04-03 PROCEDURE — 93797 PHYS/QHP OP CAR RHAB WO ECG: CPT | Mod: 59

## 2023-04-04 NOTE — PLAN
[FreeTextEntry1] : Cardiac Rehab Phase 2\par Discussed program; all questions answered.\par ITP -confer with Dr. Sheehan.\par \par See session report for details.\par

## 2023-04-04 NOTE — HISTORY OF PRESENT ILLNESS
[Type II Diabetes] : Type II Diabetes [Yes ___ How many times per day?] : Yes, [unfilled] times per day [Is Patient aware of signs and symptoms of hypoglycemia and hyperglycemia?] : patient is aware of signs and symptoms of hypoglycemia and hyperglycemia [Home monitoring of blood sugar] : home monitoring of blood sugar [Following diabetes way of eating] : following diabetes way of eating [Healthy weight management] : healthy weight management [Patient will verbalize sign and symptoms of low blood sugar and high blood sugar] : Patient will verbalize sign and symptoms of low blood sugar and high blood sugar [Individualized exercise program as developed at cardiac rehabilitation] : individualized exercise program as developed at cardiac rehabilitation [Diagnosis of prediabetes, diabetes] : diagnosis of prediabetes, diabetes [Yes, continue with Diabetes plan] : Yes, continue with Diabetes plan [Is Patient adherent with medication?] : patient is adherent with medication [Does patient monitor blood pressure at home?] : patient monitors blood pressure at home [Low sodium diet] : low sodium diet [Moderate alcohol intake] : moderate alcohol intake [Patient will verbalize factors contributing to improved blood pressure management] : Patient will verbalize factors contributing to improved blood pressure management [Patient will maintain blood pressure < 130/80 mmHg] : patient will maintain blood pressure < 130/80 mmHg [Medication Adherence] : medication adherence [Home monitoring of blood pressure] : home monitoring of blood pressure [Diet changes including (healthy heart eating, less processed foods, low sodium diet)] : Diet changes including (healthy heart eating, less processed foods, low sodium diet) [Healthy weight (management maintenance)] : Healthy weight (management maintenance) [Reduce or discontinue alcohol intake] : Reduce or discontinue alcohol intake [Halt the salt] : halt the salt [Yes, continue with Hypertension plan] : Yes, continue with Hypertension plan [Pre-contemplation] : Stage of change: pre-contemplation [Present smoker] : present smoker [Patient will demonstrate readiness to change by expressing decision to quit] : Patient will demonstrate readiness to change by expressing decision to quit [Discussed overview of risks of continued use] : overview of risks of continued use [Assess patient's relevance of quitting] : assess patient's relevance of quitting [Assist patient with community resources to quitting and provide printed education material] : assist patient with community resources to quitting and provide printed education material [Provide patient with contact information and brochure for [facility] ([facility])] : provide patient with contact information and brochure for [facility]) [Yes, continue with Smoking/Tobacco Cession plan] : Yes, continue with Smoking/Tobacco Cession plan [None] : none [PCI/Stent] : PCI/stent [Chronic stable angina] : chronic stable angina [HLD] : HLD [Diabetes Mellitus] : diabetes mellitus [Sedentary] : sedentary [Smoking] : smoking [Other restrictions: ____] : [unfilled] [Treadmill] : treadmill [Recumbent bike] : recumbent bike [BioStep] : BioStep [Walking] : walking [Assess in ___ weeks] : assess in [unfilled] weeks [Stretching/ROM exercises and balance exercises daily] : Stretching/ROM exercises and balance exercises daily [Will assess for musculoskeletal and other restrictions] : will assess for musculoskeletal and other restrictions [Perform aerobic activity for ___ consecutive minutes] : perform aerobic activity for [unfilled] consecutive minutes [To return to my previous hobbies and activities] : to return to my previous hobbies and activities  [Equipment Orientation/Safety] : equipment orientation/safety [Exercise Benefits/Guidelines education] : exercise benefits/guidelines education [Individualized Exercise Rx] : individualized exercise Rx [Yes, per exercise prescription, policy] : Yes, per exercise prescription, policy [Has the patient given CR team permission to speak with the emergency  about the patient?] : Has the patient given CR team permission to speak with the emergency  about the patient? Yes [Patient will include healthy emotional coping practices in everyday life, such as: ____] : Patient will include healthy emotional coping practices in everyday life, such as [unfilled] [Meditation] : meditation [Self-reports of improved psychosocial well-being] : Self-reports of improved psychosocial well-being [Stress management] : stress management [Yes, continue with psychosocial plan] : Yes, continue with psychosocial plan [Hypertension] : Hypertension [Diabetes] : Diabetes [Sugar] : sugar [Sodium] : sodium [Cholesterol] : cholesterol [Trans fats/saturated fats] : trans fats/saturated fats [] : yes [TC: ___] : TC: [unfilled] [HDL: ___] : HDL: [unfilled] [LDL: ___] : LDL: [unfilled] [Atorvastatin] : atorvastatin [Significant other] : significant other [Patient is interested in seeing a registered dietitian?] : Patient is interested in seeing a registered dietitian? Yes [Patient will include healthy diet pattern approaches to healthy eating] : Patient will include healthy diet pattern approaches to healthy eating [Self-reports of improved health eating patterns] : Self-reports of improved health eating patterns [MyPlate.gov] : MyPlate.gov [Discuss an overview of healthy eating plan] : Discuss an overview of healthy eating plan [Yes, continue with nutrition plan] : Yes, continue with nutrition plan [Fully functional (bathing, dressing, toileting, transferring, walking, feeding)] : Fully functional (bathing, dressing, toileting, transferring, walking, feeding) [Overview of diabetes and cardiovascular disease] : overview of diabetes and cardiovascular disease [Hypoglycemia and Hyperglycemia: sign and symptoms] : Hypoglycemia and Hyperglycemia: sign and symptoms [Define hypertension] : define hypertension [Signs and symptoms] : signs and symptoms [Exercise and blood pressure] : exercise and blood pressure [Preparation/Planning] : Stage of change: preparation/planning [Height: ___] : Height: [unfilled] [Weight: ___ lbs] : Weight: [unfilled] lbs [Body Mass Index (BMI): ___] : BMI: [unfilled] [Patient will lose 0.5 to 1 lb per week] : Patient will lose 0.5 to 1lb per week [Monitoring of weight at home and at cardiac rehabilitation] : Monitoring of weight at home and at cardiac rehabilitation [Calories and healthy weight management] : Calories and healthy weight management [Role of lifestyle behaviors in weight management] : Role of lifestyle behaviors in weight management [Yes, continue with Weight Management plan] : Yes, continue with weight management plan [In progress] : in progress [BP: ____ mmHg] : BP: [unfilled] mmHg [HR: ____ bpm] : BP: [unfilled] bpm [O2sat: ____ %] : O2sat: [unfilled] % [RPE: ____] : RPE: [unfilled]  [METS: ____] : METS: [unfilled]  [Cardiac Rehabilitation] : Cardiac Rehabilitation [___ Days per week] : [unfilled] days per week [>= 31 minutes per session] : >= 31 minutes per session [Target RPE: ___] : Target RPE: [unfilled] [Upper body ergometer] : upper body ergometer [Signs/Symptoms to report] : signs/symptoms to report [Patient verbalizes understanding of exercise education all questions answered] : Patient verbalizes understanding of exercise education all questions answered [Teach back utilized] : teach back utilized [Return demonstration] : return demonstration [Action] : Stage of change: Action [FreeTextEntry4] : Metoprolol and Losartan [FreeTextEntry5] : Renzo Fuller [de-identified] : Travel and daily walks [de-identified] : METS:\par 4/3/23: TM: 3.0; RB 2.9\par  [FreeTextEntry6] : Pt. reports doing "okay", daughter is very supportive.  [FreeTextEntry9] : Improved PHQ-9 score [Patient has seen registered dietitian in the past?] : Patient has seen registered dietitian in the past? No [FreeTextEntry8] : Improved PHQ-9 score\par Increase whole grain intake to 2-3 times per week\par Increase vegetable intake to 2-3 times per week\par Decrease red meat intake to 0-1 time per week [FreeTextEntry7] : Pt. states he does not follow a DASH/ADA diet. Expressed interest in seeing a dietitian, Katherin Broussard RD card will be mailed in welcome packet. [FreeTextEntry1] : 57 year old male hx of  HTN, HL,IDDM,  CAD S/p MI  - [facility]  - S/p PCI>pLAD (residual pRCA 70% and Diag 70%), S/p PCI mRCA in . \par  \par -Recent complaints of recurrent typical chest pain; -referred to cardiac rehab with a clinical indication: Chronic Stable Angina. \par 4/3/23: Pt arrived for session #1 at [facility] on 3/8/23, , did not exercise as he endorsed a recent h/o chest tightness. Patient had nuclear stress test which shoed mild deborah-infarct apical ischemia, but patent LAD as 12/22 angiogram reviewed by Dr. Sheehan and Dr. Dean. Patient given okay to resume cardiac rehab. Today, patiient states he is feeling well, and is looking forward to starting program, denies chest pain, dizziness, shortness of breath or other focal complaints.\par

## 2023-04-04 NOTE — PHYSICAL EXAM
[No Acute Distress] : no acute distress [Well-Appearing] : well-appearing [No JVD] : no jugular venous distention [Supple] : supple [Normal] : normal rate, regular rhythm, normal S1 and S2 and no murmur heard [No Edema] : there was no peripheral edema [Soft] : abdomen soft [Non Tender] : non-tender [No CVA Tenderness] : no CVA  tenderness [No Spinal Tenderness] : no spinal tenderness [No Focal Deficits] : no focal deficits [Normal Gait] : normal gait

## 2023-04-04 NOTE — REASON FOR VISIT
[Intake Visit] : an intake visit [Assessment] : an assessment [FreeTextEntry1] : ITP to be reviewed and manually signed by Dr. Sheehan; Clinical indication for cardiac rehabilitation:PCI

## 2023-04-04 NOTE — REVIEW OF SYSTEMS
[Chest Pain] : chest pain [Dizziness] : dizziness [Negative] : Genitourinary [FreeTextEntry5] : Unstable Angina [FreeTextEntry9] : Chronic left shoulder pain [de-identified] : Positional

## 2023-04-04 NOTE — HISTORY OF PRESENT ILLNESS
[Type II Diabetes] : Type II Diabetes [Yes ___ How many times per day?] : Yes, [unfilled] times per day [Is Patient aware of signs and symptoms of hypoglycemia and hyperglycemia?] : patient is aware of signs and symptoms of hypoglycemia and hyperglycemia [Home monitoring of blood sugar] : home monitoring of blood sugar [Following diabetes way of eating] : following diabetes way of eating [Healthy weight management] : healthy weight management [Patient will verbalize sign and symptoms of low blood sugar and high blood sugar] : Patient will verbalize sign and symptoms of low blood sugar and high blood sugar [Individualized exercise program as developed at cardiac rehabilitation] : individualized exercise program as developed at cardiac rehabilitation [Diagnosis of prediabetes, diabetes] : diagnosis of prediabetes, diabetes [Yes, continue with Diabetes plan] : Yes, continue with Diabetes plan [Is Patient adherent with medication?] : patient is adherent with medication [Does patient monitor blood pressure at home?] : patient monitors blood pressure at home [Low sodium diet] : low sodium diet [Moderate alcohol intake] : moderate alcohol intake [Patient will verbalize factors contributing to improved blood pressure management] : Patient will verbalize factors contributing to improved blood pressure management [Patient will maintain blood pressure < 130/80 mmHg] : patient will maintain blood pressure < 130/80 mmHg [Medication Adherence] : medication adherence [Home monitoring of blood pressure] : home monitoring of blood pressure [Diet changes including (healthy heart eating, less processed foods, low sodium diet)] : Diet changes including (healthy heart eating, less processed foods, low sodium diet) [Healthy weight (management maintenance)] : Healthy weight (management maintenance) [Reduce or discontinue alcohol intake] : Reduce or discontinue alcohol intake [Halt the salt] : halt the salt [Yes, continue with Hypertension plan] : Yes, continue with Hypertension plan [Pre-contemplation] : Stage of change: pre-contemplation [Present smoker] : present smoker [Patient will demonstrate readiness to change by expressing decision to quit] : Patient will demonstrate readiness to change by expressing decision to quit [Discussed overview of risks of continued use] : overview of risks of continued use [Assess patient's relevance of quitting] : assess patient's relevance of quitting [Assist patient with community resources to quitting and provide printed education material] : assist patient with community resources to quitting and provide printed education material [Provide patient with contact information and brochure for [facility] ([facility])] : provide patient with contact information and brochure for [facility] ([facility]) [Yes, continue with Smoking/Tobacco Cession plan] : Yes, continue with Smoking/Tobacco Cession plan [None] : none [PCI/Stent] : PCI/stent [Chronic stable angina] : chronic stable angina [HLD] : HLD [Diabetes Mellitus] : diabetes mellitus [Sedentary] : sedentary [Smoking] : smoking [Other restrictions: ____] : [unfilled] [Treadmill] : treadmill [Recumbent bike] : recumbent bike [BioStep] : BioStep [Walking] : walking [Assess in ___ weeks] : assess in [unfilled] weeks [Stretching/ROM exercises and balance exercises daily] : Stretching/ROM exercises and balance exercises daily [Will assess for musculoskeletal and other restrictions] : will assess for musculoskeletal and other restrictions [Perform aerobic activity for ___ consecutive minutes] : perform aerobic activity for [unfilled] consecutive minutes [To return to my previous hobbies and activities] : to return to my previous hobbies and activities  [Equipment Orientation/Safety] : equipment orientation/safety [Exercise Benefits/Guidelines education] : exercise benefits/guidelines education [Individualized Exercise Rx] : individualized exercise Rx [Yes, per exercise prescription, policy] : Yes, per exercise prescription, policy [Has the patient given CR team permission to speak with the emergency  about the patient?] : Has the patient given CR team permission to speak with the emergency  about the patient? Yes [Patient will include healthy emotional coping practices in everyday life, such as: ____] : Patient will include healthy emotional coping practices in everyday life, such as [unfilled] [Meditation] : meditation [Self-reports of improved psychosocial well-being] : Self-reports of improved psychosocial well-being [Stress management] : stress management [Yes, continue with psychosocial plan] : Yes, continue with psychosocial plan [Hypertension] : Hypertension [Diabetes] : Diabetes [Sugar] : sugar [Sodium] : sodium [Cholesterol] : cholesterol [Trans fats/saturated fats] : trans fats/saturated fats [] : yes [TC: ___] : TC: [unfilled] [HDL: ___] : HDL: [unfilled] [LDL: ___] : LDL: [unfilled] [Atorvastatin] : atorvastatin [Significant other] : significant other [Patient is interested in seeing a registered dietitian?] : Patient is interested in seeing a registered dietitian? Yes [Patient will include healthy diet pattern approaches to healthy eating] : Patient will include healthy diet pattern approaches to healthy eating [Self-reports of improved health eating patterns] : Self-reports of improved health eating patterns [MyPlate.gov] : MyPlate.gov [Discuss an overview of healthy eating plan] : Discuss an overview of healthy eating plan [Yes, continue with nutrition plan] : Yes, continue with nutrition plan [Fully functional (bathing, dressing, toileting, transferring, walking, feeding)] : Fully functional (bathing, dressing, toileting, transferring, walking, feeding) [Overview of diabetes and cardiovascular disease] : overview of diabetes and cardiovascular disease [Hypoglycemia and Hyperglycemia: sign and symptoms] : Hypoglycemia and Hyperglycemia: sign and symptoms [Define hypertension] : define hypertension [Signs and symptoms] : signs and symptoms [Exercise and blood pressure] : exercise and blood pressure [Preparation/Planning] : Stage of change: preparation/planning [Height: ___] : Height: [unfilled] [Weight: ___ lbs] : Weight: [unfilled] lbs [Body Mass Index (BMI): ___] : BMI: [unfilled] [Patient will lose 0.5 to 1 lb per week] : Patient will lose 0.5 to 1lb per week [Monitoring of weight at home and at cardiac rehabilitation] : Monitoring of weight at home and at cardiac rehabilitation [Calories and healthy weight management] : Calories and healthy weight management [Role of lifestyle behaviors in weight management] : Role of lifestyle behaviors in weight management [Yes, continue with Weight Management plan] : Yes, continue with weight management plan [In progress] : in progress [BP: ____ mmHg] : BP: [unfilled] mmHg [HR: ____ bpm] : BP: [unfilled] bpm [O2sat: ____ %] : O2sat: [unfilled] % [RPE: ____] : RPE: [unfilled]  [METS: ____] : METS: [unfilled]  [Cardiac Rehabilitation] : Cardiac Rehabilitation [___ Days per week] : [unfilled] days per week [>= 31 minutes per session] : >= 31 minutes per session [Target RPE: ___] : Target RPE: [unfilled] [Upper body ergometer] : upper body ergometer [Signs/Symptoms to report] : signs/symptoms to report [Patient verbalizes understanding of exercise education all questions answered] : Patient verbalizes understanding of exercise education all questions answered [Teach back utilized] : teach back utilized [Return demonstration] : return demonstration [Action] : Stage of change: Action [FreeTextEntry4] : Metoprolol and Losartan [FreeTextEntry5] : Renzo Fuller [de-identified] : METS:\par 4/3/23: TM: 3.0; RB 2.9\par  [de-identified] : Travel and daily walks [FreeTextEntry6] : Pt. reports doing "okay", daughter is very supportive.  [FreeTextEntry9] : Improved PHQ-9 score [Patient has seen registered dietitian in the past?] : Patient has seen registered dietitian in the past? No [FreeTextEntry8] : Improved PHQ-9 score\par Increase whole grain intake to 2-3 times per week\par Increase vegetable intake to 2-3 times per week\par Decrease red meat intake to 0-1 time per week [FreeTextEntry7] : Pt. states he does not follow a DASH/ADA diet. Expressed interest in seeing a dietitian, Katherin Broussard RD card will be mailed in welcome packet. [FreeTextEntry1] : 57 year old male hx of  HTN, HL,IDDM,  CAD S/p MI  - [facility]  - S/p PCI>pLAD (residual pRCA 70% and Diag 70%), S/p PCI mRCA in . \par  \par -Recent complaints of recurrent typical chest pain; -referred to cardiac rehab with a clinical indication: Chronic Stable Angina. \par 4/3/23: Pt arrived for session #1 at [facility] on 3/8/23, , did not exercise as he endorsed a recent h/o chest tightness. Patient had nuclear stress test which shoed mild deborah-infarct apical ischemia, but patent LAD as 12/22 angiogram reviewed by Dr. Sheehan and Dr. Dean. Patient given okay to resume cardiac rehab. Today, patiient states he is feeling well, and is looking forward to starting program, denies chest pain, dizziness, shortness of breath or other focal complaints.\par

## 2023-04-04 NOTE — REVIEW OF SYSTEMS
[Chest Pain] : chest pain [Dizziness] : dizziness [Negative] : Genitourinary [FreeTextEntry5] : Unstable Angina [FreeTextEntry9] : Chronic left shoulder pain [de-identified] : Positional

## 2023-04-04 NOTE — ASSESSMENT
[FreeTextEntry1] : Patient is a 57 year old man referred for cardiac rehabilitation phase 2 by Dr. Jad Kay.\par \par Clinical indication: Chronic Stable Angina \par \par  LVEF 40%

## 2023-04-06 ENCOUNTER — APPOINTMENT (OUTPATIENT)
Dept: CARDIOLOGY | Facility: CLINIC | Age: 58
End: 2023-04-06
Payer: COMMERCIAL

## 2023-04-06 PROCEDURE — 93797 PHYS/QHP OP CAR RHAB WO ECG: CPT

## 2023-04-10 ENCOUNTER — APPOINTMENT (OUTPATIENT)
Dept: CARDIOLOGY | Facility: CLINIC | Age: 58
End: 2023-04-10
Payer: COMMERCIAL

## 2023-04-10 PROCEDURE — 93797 PHYS/QHP OP CAR RHAB WO ECG: CPT

## 2023-04-12 ENCOUNTER — INPATIENT (INPATIENT)
Facility: HOSPITAL | Age: 58
LOS: 0 days | Discharge: HOME | DRG: 916 | End: 2023-04-13
Attending: STUDENT IN AN ORGANIZED HEALTH CARE EDUCATION/TRAINING PROGRAM | Admitting: STUDENT IN AN ORGANIZED HEALTH CARE EDUCATION/TRAINING PROGRAM
Payer: COMMERCIAL

## 2023-04-12 VITALS
SYSTOLIC BLOOD PRESSURE: 122 MMHG | HEART RATE: 95 BPM | HEIGHT: 71 IN | WEIGHT: 225.09 LBS | DIASTOLIC BLOOD PRESSURE: 73 MMHG | TEMPERATURE: 99 F | RESPIRATION RATE: 20 BRPM | OXYGEN SATURATION: 98 %

## 2023-04-12 DIAGNOSIS — R22.0 LOCALIZED SWELLING, MASS AND LUMP, HEAD: ICD-10-CM

## 2023-04-12 DIAGNOSIS — Z95.5 PRESENCE OF CORONARY ANGIOPLASTY IMPLANT AND GRAFT: Chronic | ICD-10-CM

## 2023-04-12 LAB
ALBUMIN SERPL ELPH-MCNC: 4.2 G/DL — SIGNIFICANT CHANGE UP (ref 3.3–5)
ALP SERPL-CCNC: 98 U/L — SIGNIFICANT CHANGE UP (ref 40–120)
ALT FLD-CCNC: 44 U/L — SIGNIFICANT CHANGE UP (ref 10–45)
ANION GAP SERPL CALC-SCNC: 9 MMOL/L — SIGNIFICANT CHANGE UP (ref 5–17)
AST SERPL-CCNC: 19 U/L — SIGNIFICANT CHANGE UP (ref 10–40)
BASE EXCESS BLDV CALC-SCNC: 0.3 MMOL/L — SIGNIFICANT CHANGE UP (ref -2–3)
BASOPHILS # BLD AUTO: 0.06 K/UL — SIGNIFICANT CHANGE UP (ref 0–0.2)
BASOPHILS NFR BLD AUTO: 0.6 % — SIGNIFICANT CHANGE UP (ref 0–2)
BILIRUB SERPL-MCNC: 0.6 MG/DL — SIGNIFICANT CHANGE UP (ref 0.2–1.2)
BUN SERPL-MCNC: 14 MG/DL — SIGNIFICANT CHANGE UP (ref 7–23)
CA-I SERPL-SCNC: 1.32 MMOL/L — SIGNIFICANT CHANGE UP (ref 1.15–1.33)
CALCIUM SERPL-MCNC: 10.2 MG/DL — SIGNIFICANT CHANGE UP (ref 8.4–10.5)
CHLORIDE BLDV-SCNC: 103 MMOL/L — SIGNIFICANT CHANGE UP (ref 96–108)
CHLORIDE SERPL-SCNC: 104 MMOL/L — SIGNIFICANT CHANGE UP (ref 96–108)
CO2 BLDV-SCNC: 32 MMOL/L — HIGH (ref 22–26)
CO2 SERPL-SCNC: 28 MMOL/L — SIGNIFICANT CHANGE UP (ref 22–31)
CREAT SERPL-MCNC: 1.18 MG/DL — SIGNIFICANT CHANGE UP (ref 0.5–1.3)
EGFR: 72 ML/MIN/1.73M2 — SIGNIFICANT CHANGE UP
EOSINOPHIL # BLD AUTO: 0.24 K/UL — SIGNIFICANT CHANGE UP (ref 0–0.5)
EOSINOPHIL NFR BLD AUTO: 2.4 % — SIGNIFICANT CHANGE UP (ref 0–6)
GAS PNL BLDV: 138 MMOL/L — SIGNIFICANT CHANGE UP (ref 136–145)
GAS PNL BLDV: SIGNIFICANT CHANGE UP
GAS PNL BLDV: SIGNIFICANT CHANGE UP
GLUCOSE BLDV-MCNC: 209 MG/DL — HIGH (ref 70–99)
GLUCOSE SERPL-MCNC: 199 MG/DL — HIGH (ref 70–99)
HCO3 BLDV-SCNC: 30 MMOL/L — HIGH (ref 22–29)
HCT VFR BLD CALC: 47.4 % — SIGNIFICANT CHANGE UP (ref 39–50)
HCT VFR BLDA CALC: 46 % — SIGNIFICANT CHANGE UP (ref 39–51)
HGB BLD CALC-MCNC: 15.2 G/DL — SIGNIFICANT CHANGE UP (ref 12.6–17.4)
HGB BLD-MCNC: 14.9 G/DL — SIGNIFICANT CHANGE UP (ref 13–17)
IMM GRANULOCYTES NFR BLD AUTO: 0.6 % — SIGNIFICANT CHANGE UP (ref 0–0.9)
LACTATE BLDV-MCNC: 4.2 MMOL/L — CRITICAL HIGH (ref 0.5–2)
LACTATE SERPL-SCNC: 1.3 MMOL/L — SIGNIFICANT CHANGE UP (ref 0.5–2)
LYMPHOCYTES # BLD AUTO: 3.62 K/UL — HIGH (ref 1–3.3)
LYMPHOCYTES # BLD AUTO: 36.8 % — SIGNIFICANT CHANGE UP (ref 13–44)
MCHC RBC-ENTMCNC: 26 PG — LOW (ref 27–34)
MCHC RBC-ENTMCNC: 31.4 GM/DL — LOW (ref 32–36)
MCV RBC AUTO: 82.6 FL — SIGNIFICANT CHANGE UP (ref 80–100)
MONOCYTES # BLD AUTO: 0.81 K/UL — SIGNIFICANT CHANGE UP (ref 0–0.9)
MONOCYTES NFR BLD AUTO: 8.2 % — SIGNIFICANT CHANGE UP (ref 2–14)
NEUTROPHILS # BLD AUTO: 5.05 K/UL — SIGNIFICANT CHANGE UP (ref 1.8–7.4)
NEUTROPHILS NFR BLD AUTO: 51.4 % — SIGNIFICANT CHANGE UP (ref 43–77)
NRBC # BLD: 0 /100 WBCS — SIGNIFICANT CHANGE UP (ref 0–0)
NT-PROBNP SERPL-SCNC: 80 PG/ML — SIGNIFICANT CHANGE UP (ref 0–300)
PCO2 BLDV: 72 MMHG — HIGH (ref 42–55)
PH BLDV: 7.23 — LOW (ref 7.32–7.43)
PLATELET # BLD AUTO: 233 K/UL — SIGNIFICANT CHANGE UP (ref 150–400)
PO2 BLDV: 28 MMHG — SIGNIFICANT CHANGE UP (ref 25–45)
POTASSIUM BLDV-SCNC: 4.6 MMOL/L — SIGNIFICANT CHANGE UP (ref 3.5–5.1)
POTASSIUM SERPL-MCNC: 5.2 MMOL/L — SIGNIFICANT CHANGE UP (ref 3.5–5.3)
POTASSIUM SERPL-SCNC: 5.2 MMOL/L — SIGNIFICANT CHANGE UP (ref 3.5–5.3)
PROT SERPL-MCNC: 7 G/DL — SIGNIFICANT CHANGE UP (ref 6–8.3)
RBC # BLD: 5.74 M/UL — SIGNIFICANT CHANGE UP (ref 4.2–5.8)
RBC # FLD: 15.4 % — HIGH (ref 10.3–14.5)
SAO2 % BLDV: 39.2 % — LOW (ref 67–88)
SARS-COV-2 RNA SPEC QL NAA+PROBE: SIGNIFICANT CHANGE UP
SODIUM SERPL-SCNC: 141 MMOL/L — SIGNIFICANT CHANGE UP (ref 135–145)
TROPONIN T, HIGH SENSITIVITY RESULT: 36 NG/L — SIGNIFICANT CHANGE UP (ref 0–51)
WBC # BLD: 9.84 K/UL — SIGNIFICANT CHANGE UP (ref 3.8–10.5)
WBC # FLD AUTO: 9.84 K/UL — SIGNIFICANT CHANGE UP (ref 3.8–10.5)

## 2023-04-12 PROCEDURE — 99285 EMERGENCY DEPT VISIT HI MDM: CPT

## 2023-04-12 PROCEDURE — 71045 X-RAY EXAM CHEST 1 VIEW: CPT | Mod: 26

## 2023-04-12 RX ORDER — DIPHENHYDRAMINE HCL 50 MG
25 CAPSULE ORAL ONCE
Refills: 0 | Status: COMPLETED | OUTPATIENT
Start: 2023-04-12 | End: 2023-04-12

## 2023-04-12 RX ORDER — ASPIRIN/CALCIUM CARB/MAGNESIUM 324 MG
81 TABLET ORAL DAILY
Refills: 0 | Status: DISCONTINUED | OUTPATIENT
Start: 2023-04-12 | End: 2023-04-13

## 2023-04-12 RX ORDER — FAMOTIDINE 10 MG/ML
20 INJECTION INTRAVENOUS ONCE
Refills: 0 | Status: COMPLETED | OUTPATIENT
Start: 2023-04-12 | End: 2023-04-12

## 2023-04-12 RX ADMIN — FAMOTIDINE 20 MILLIGRAM(S): 10 INJECTION INTRAVENOUS at 18:58

## 2023-04-12 RX ADMIN — Medication 81 MILLIGRAM(S): at 19:43

## 2023-04-12 RX ADMIN — Medication 25 MILLIGRAM(S): at 18:58

## 2023-04-12 RX ADMIN — Medication 125 MILLIGRAM(S): at 18:58

## 2023-04-12 NOTE — ED PROVIDER NOTE - ATTENDING APP SHARED VISIT CONTRIBUTION OF CARE
Nik Louis MD:  I personally saw the patient and performed a substantive portion of the visit including all aspects of the medical decision making.    MDM: 57-year-old male with history of CAD status post stent, diabetes, hypertension, hyperlipidemia, NSTEMI who presents with lip swelling for last 4 to 5 days.  Patient states that he took nitroglycerin for this the first time, and his symptoms started shortly afterwards.  Patient also reports recent dental procedure, unsure of what medications he was given.  Reports mild swelling to the tongue, as well as changes in his voice.  However he denies any fevers, chills, diaphoresis, dizziness, shortness of breath, wheezing, drooling, stridor, abdominal pain, nausea, vomiting, diarrhea.    On examination patient has stable vitals, normal oxygenation.  Patient with mild edema noted over the upper and lower lips, however there is no significant swelling of the tongue.  There is no posterior oropharyngeal swelling, no stridor, no trismus, normal range of motion of neck.  Lungs CTAB without wheezing, normal air entry.  Cardiac RRR, there is no calf tenderness or leg swelling. Neurovascularly intact bilaterally with soft compartments. Negative Larisa's sign.    Will evaluate patient for ACS. EKG does not show evidence of ST depressions or elevations. Breath sounds symmetric, not likely to be pneumothorax or pneumonia. No signs or symptoms concerning for pulmonary embolism or aortic dissection. Will obtain EKG, CXR and labs including troponin. Will keep patient on cardiac monitor.  Concern for angioedema versus allergic reaction, but not consistent with anaphylaxis given isolated lip swelling on examination and no other organ systems involved.  Will give antihistamine and steroids, but will withhold epinephrine given well appearance.  Will monitor closely in the ED.    My independent interpretation of the EKG shows:  Normal sinus rhythm with rate of 83 bpm, leftward axis, T wave inversion noted in 1 and aVL, no ST elevations or depressions.  Similar to 12/20/2022.     Differential includes but is not limited to: See above    Patient with new problems requiring additional work-up and treatment, following orders: see above  Discussed case with: Admitting physician  Obtained and reviewed external records: Discharge note from 12/24/2021  Additional history obtained from: Daughter at bedside  Chronic conditions and social determinants of health affecting care: See above  Consideration of admission

## 2023-04-12 NOTE — ED PROVIDER NOTE - OBJECTIVE STATEMENT
56 y/o male with PMHx of CAD with stent, DM T2, HTN, HLD,  NSTEMI presents to the ED complaining of lip swelling x 5 days. Patient states that symptoms began on Saturday after he took Nitroglycerin for the first time. He states that he was having chest pain on Saturday after doing physical activity. His doctor prescribed him nitroglycerin. He took two tablets which helped his chest pain improve. He then noticed that his lips began to swell up after taking the medication. He reports that as days went on the swelling worsened. Today he reports swelling to his tongue as well. Reports that his voice appears to be different today due to tongue swelling. He has not taken nitroglycerin since. He reports still having chest pain to the left side of his chest. Cardiologist is Dr. Vidales. He has no known allergies. Denies ever having symptoms like this before. He is having difficulty eating/drinking due to swelling. He denies any headache, fever, chills, SOB, cough, n/v/d. No difficulty breathing.

## 2023-04-12 NOTE — ED PROVIDER NOTE - NS ED ATTENDING STATEMENT MOD
This was a shared visit with the CATALINO. I reviewed and verified the documentation and independently performed the documented:

## 2023-04-12 NOTE — ED ADULT NURSE NOTE - OBJECTIVE STATEMENT
pt is a 58yo male PMH HTN, DM, HLD, cardiac stent x2 presenting to the ED for allergic reaction. pt states he took nitroglycerin sublingual x2 on Friday for cp, nitro given to pt following cardiac cath procedure 3 weeks ago for PRN use. pt states he noticed upper and lower lip swelling on saturday, endorsing upper and lower lip tingling/numbness, tongue swelling, voice hoarseness, lips and tongue appear swollen, airway intact, throat non-red, tonsils +2. pt denies any other medication, diet, or lifestyle changes other than nitro. pt endorsing L sided cp. pt A&Ox3 gross neuro intact, no difficulty speaking in complete sentences, s1s2 heart sounds heard, pulses x 4, nicolas x4, abdomen soft nontender nondistended, skin intact. pt denies sob, ha, n/v/d, abdominal pain, f/c, urinary symptoms, hematuria

## 2023-04-12 NOTE — ED PROVIDER NOTE - PHYSICAL EXAMINATION
CONSTITUTIONAL: Patient is awake, alert and oriented x 3. Patient is well appearing and in no acute distress.  HEAD: NCAT  ENT: Airway patent, Nasal mucosa clear, (+) edema to tongue and upper and lower lips, uvula and tonsils are midline and without swelling.   NECK: Supple,   LUNGS: CTA B/L, no wheezes, rhonci or rales  HEART: RRR.+S1S2   ABDOMEN: Soft, non-tender to palpation throughout all four quadrants  MSK: FROM upper and lower ext b/l,   SKIN: No rash or lesions  NEURO: No focal deficits, CONSTITUTIONAL: Patient is awake, alert and oriented x 3. Patient is well appearing and in no acute distress.  HEAD: NCAT  ENT: Airway patent, Nasal mucosa clear, (+) edema to upper and lower lips, uvula and tonsils are midline and without swelling.   NECK: Supple,   LUNGS: CTA B/L, no wheezes, rhonci or rales  HEART: RRR.+S1S2   ABDOMEN: Soft, non-tender to palpation throughout all four quadrants  MSK: FROM upper and lower ext b/l,   SKIN: No rash or lesions  NEURO: No focal deficits,

## 2023-04-12 NOTE — ED ADULT TRIAGE NOTE - CHIEF COMPLAINT QUOTE
worsening lip swelling x 4 days s/p taking nitroglycerin 6 days ago   Denies difficulty breathing and swallowing

## 2023-04-13 ENCOUNTER — RX RENEWAL (OUTPATIENT)
Age: 58
End: 2023-04-13

## 2023-04-13 ENCOUNTER — TRANSCRIPTION ENCOUNTER (OUTPATIENT)
Age: 58
End: 2023-04-13

## 2023-04-13 VITALS — OXYGEN SATURATION: 96 % | RESPIRATION RATE: 18 BRPM | HEART RATE: 90 BPM

## 2023-04-13 DIAGNOSIS — T78.3XXA ANGIONEUROTIC EDEMA, INITIAL ENCOUNTER: ICD-10-CM

## 2023-04-13 DIAGNOSIS — E11.65 TYPE 2 DIABETES MELLITUS WITH HYPERGLYCEMIA: ICD-10-CM

## 2023-04-13 DIAGNOSIS — I10 ESSENTIAL (PRIMARY) HYPERTENSION: ICD-10-CM

## 2023-04-13 DIAGNOSIS — Z29.9 ENCOUNTER FOR PROPHYLACTIC MEASURES, UNSPECIFIED: ICD-10-CM

## 2023-04-13 DIAGNOSIS — E78.5 HYPERLIPIDEMIA, UNSPECIFIED: ICD-10-CM

## 2023-04-13 DIAGNOSIS — I25.10 ATHEROSCLEROTIC HEART DISEASE OF NATIVE CORONARY ARTERY WITHOUT ANGINA PECTORIS: ICD-10-CM

## 2023-04-13 LAB
GLUCOSE BLDC GLUCOMTR-MCNC: 287 MG/DL — HIGH (ref 70–99)
GLUCOSE BLDC GLUCOMTR-MCNC: 347 MG/DL — HIGH (ref 70–99)
GLUCOSE BLDC GLUCOMTR-MCNC: 348 MG/DL — HIGH (ref 70–99)
GLUCOSE BLDC GLUCOMTR-MCNC: 364 MG/DL — HIGH (ref 70–99)
GLUCOSE SERPL-MCNC: 377 MG/DL — HIGH (ref 70–99)

## 2023-04-13 PROCEDURE — 82962 GLUCOSE BLOOD TEST: CPT

## 2023-04-13 PROCEDURE — 36415 COLL VENOUS BLD VENIPUNCTURE: CPT

## 2023-04-13 PROCEDURE — 84484 ASSAY OF TROPONIN QUANT: CPT

## 2023-04-13 PROCEDURE — 85025 COMPLETE CBC W/AUTO DIFF WBC: CPT

## 2023-04-13 PROCEDURE — 71045 X-RAY EXAM CHEST 1 VIEW: CPT

## 2023-04-13 PROCEDURE — 99285 EMERGENCY DEPT VISIT HI MDM: CPT | Mod: 25

## 2023-04-13 PROCEDURE — 82330 ASSAY OF CALCIUM: CPT

## 2023-04-13 PROCEDURE — 96375 TX/PRO/DX INJ NEW DRUG ADDON: CPT

## 2023-04-13 PROCEDURE — 99222 1ST HOSP IP/OBS MODERATE 55: CPT

## 2023-04-13 PROCEDURE — 84295 ASSAY OF SERUM SODIUM: CPT

## 2023-04-13 PROCEDURE — 82803 BLOOD GASES ANY COMBINATION: CPT

## 2023-04-13 PROCEDURE — 83605 ASSAY OF LACTIC ACID: CPT

## 2023-04-13 PROCEDURE — U0005: CPT

## 2023-04-13 PROCEDURE — 80053 COMPREHEN METABOLIC PANEL: CPT

## 2023-04-13 PROCEDURE — 96374 THER/PROPH/DIAG INJ IV PUSH: CPT

## 2023-04-13 PROCEDURE — U0003: CPT

## 2023-04-13 PROCEDURE — 83880 ASSAY OF NATRIURETIC PEPTIDE: CPT

## 2023-04-13 PROCEDURE — 82435 ASSAY OF BLOOD CHLORIDE: CPT

## 2023-04-13 PROCEDURE — 82947 ASSAY GLUCOSE BLOOD QUANT: CPT

## 2023-04-13 PROCEDURE — 85018 HEMOGLOBIN: CPT

## 2023-04-13 PROCEDURE — 84132 ASSAY OF SERUM POTASSIUM: CPT

## 2023-04-13 PROCEDURE — 99284 EMERGENCY DEPT VISIT MOD MDM: CPT

## 2023-04-13 PROCEDURE — 85014 HEMATOCRIT: CPT

## 2023-04-13 RX ORDER — INSULIN LISPRO 100/ML
VIAL (ML) SUBCUTANEOUS ONCE
Refills: 0 | Status: COMPLETED | OUTPATIENT
Start: 2023-04-13 | End: 2023-04-13

## 2023-04-13 RX ORDER — INSULIN LISPRO 100/ML
20 VIAL (ML) SUBCUTANEOUS
Refills: 0 | Status: DISCONTINUED | OUTPATIENT
Start: 2023-04-13 | End: 2023-04-13

## 2023-04-13 RX ORDER — INSULIN LISPRO 100/ML
20 VIAL (ML) SUBCUTANEOUS
Qty: 0 | Refills: 0 | DISCHARGE

## 2023-04-13 RX ORDER — ASPIRIN/CALCIUM CARB/MAGNESIUM 324 MG
81 TABLET ORAL DAILY
Refills: 0 | Status: DISCONTINUED | OUTPATIENT
Start: 2023-04-13 | End: 2023-04-13

## 2023-04-13 RX ORDER — INSULIN LISPRO 100/ML
VIAL (ML) SUBCUTANEOUS AT BEDTIME
Refills: 0 | Status: DISCONTINUED | OUTPATIENT
Start: 2023-04-13 | End: 2023-04-13

## 2023-04-13 RX ORDER — INSULIN LISPRO 100/ML
VIAL (ML) SUBCUTANEOUS
Refills: 0 | Status: DISCONTINUED | OUTPATIENT
Start: 2023-04-13 | End: 2023-04-13

## 2023-04-13 RX ORDER — INSULIN GLARGINE 100 [IU]/ML
30 INJECTION, SOLUTION SUBCUTANEOUS
Qty: 0 | Refills: 0 | DISCHARGE

## 2023-04-13 RX ORDER — INSULIN LISPRO 100/ML
32 VIAL (ML) SUBCUTANEOUS
Qty: 3 | Refills: 0
Start: 2023-04-13 | End: 2023-05-12

## 2023-04-13 RX ORDER — INSULIN GLARGINE 100 [IU]/ML
38 INJECTION, SOLUTION SUBCUTANEOUS
Qty: 3 | Refills: 0
Start: 2023-04-13 | End: 2023-05-12

## 2023-04-13 RX ORDER — INSULIN GLARGINE 100 [IU]/ML
20 INJECTION, SOLUTION SUBCUTANEOUS EVERY MORNING
Refills: 0 | Status: DISCONTINUED | OUTPATIENT
Start: 2023-04-13 | End: 2023-04-13

## 2023-04-13 RX ORDER — DEXTROSE 50 % IN WATER 50 %
25 SYRINGE (ML) INTRAVENOUS ONCE
Refills: 0 | Status: DISCONTINUED | OUTPATIENT
Start: 2023-04-13 | End: 2023-04-13

## 2023-04-13 RX ORDER — METOPROLOL TARTRATE 50 MG
50 TABLET ORAL DAILY
Refills: 0 | Status: DISCONTINUED | OUTPATIENT
Start: 2023-04-13 | End: 2023-04-13

## 2023-04-13 RX ORDER — GLUCAGON INJECTION, SOLUTION 0.5 MG/.1ML
1 INJECTION, SOLUTION SUBCUTANEOUS ONCE
Refills: 0 | Status: DISCONTINUED | OUTPATIENT
Start: 2023-04-13 | End: 2023-04-13

## 2023-04-13 RX ORDER — PETROLATUM,WHITE
1 JELLY (GRAM) TOPICAL
Refills: 0 | Status: DISCONTINUED | OUTPATIENT
Start: 2023-04-13 | End: 2023-04-13

## 2023-04-13 RX ORDER — SODIUM CHLORIDE 9 MG/ML
1000 INJECTION, SOLUTION INTRAVENOUS
Refills: 0 | Status: DISCONTINUED | OUTPATIENT
Start: 2023-04-13 | End: 2023-04-13

## 2023-04-13 RX ORDER — HEPARIN SODIUM 5000 [USP'U]/ML
5000 INJECTION INTRAVENOUS; SUBCUTANEOUS EVERY 8 HOURS
Refills: 0 | Status: DISCONTINUED | OUTPATIENT
Start: 2023-04-13 | End: 2023-04-13

## 2023-04-13 RX ORDER — DEXTROSE 50 % IN WATER 50 %
15 SYRINGE (ML) INTRAVENOUS ONCE
Refills: 0 | Status: DISCONTINUED | OUTPATIENT
Start: 2023-04-13 | End: 2023-04-13

## 2023-04-13 RX ORDER — DEXTROSE 50 % IN WATER 50 %
12.5 SYRINGE (ML) INTRAVENOUS ONCE
Refills: 0 | Status: DISCONTINUED | OUTPATIENT
Start: 2023-04-13 | End: 2023-04-13

## 2023-04-13 RX ORDER — ATORVASTATIN CALCIUM 80 MG/1
40 TABLET, FILM COATED ORAL AT BEDTIME
Refills: 0 | Status: DISCONTINUED | OUTPATIENT
Start: 2023-04-13 | End: 2023-04-13

## 2023-04-13 RX ORDER — INSULIN LISPRO 100/ML
35 VIAL (ML) SUBCUTANEOUS
Refills: 0 | DISCHARGE

## 2023-04-13 RX ADMIN — Medication 3: at 11:21

## 2023-04-13 RX ADMIN — Medication 20 UNIT(S): at 07:49

## 2023-04-13 RX ADMIN — Medication 40 MILLIGRAM(S): at 06:45

## 2023-04-13 RX ADMIN — Medication 20 UNIT(S): at 11:21

## 2023-04-13 RX ADMIN — INSULIN GLARGINE 20 UNIT(S): 100 INJECTION, SOLUTION SUBCUTANEOUS at 09:12

## 2023-04-13 RX ADMIN — Medication 50 MILLIGRAM(S): at 06:45

## 2023-04-13 RX ADMIN — HEPARIN SODIUM 5000 UNIT(S): 5000 INJECTION INTRAVENOUS; SUBCUTANEOUS at 06:44

## 2023-04-13 RX ADMIN — Medication 1 APPLICATION(S): at 11:21

## 2023-04-13 RX ADMIN — Medication 1 APPLICATION(S): at 17:11

## 2023-04-13 RX ADMIN — Medication 81 MILLIGRAM(S): at 17:20

## 2023-04-13 RX ADMIN — Medication 5: at 07:49

## 2023-04-13 RX ADMIN — Medication 4: at 03:59

## 2023-04-13 NOTE — H&P ADULT - NSHPLABSRESULTS_GEN_ALL_CORE
LABS:                         14.9   9.84  )-----------( 233      ( 12 Apr 2023 19:13 )             47.4     04-12    141  |  104  |  14  ----------------------------<  199<H>  5.2   |  28  |  1.18    Ca    10.2      12 Apr 2023 19:13    TPro  7.0  /  Alb  4.2  /  TBili  0.6  /  DBili  x   /  AST  19  /  ALT  44  /  AlkPhos  98  04-12              Lactate, Blood: 1.3 mmol/L (04-12 @ 22:10)    Records reviewed from prior hospitalization.  Labs reviewed remarkable for - CBC unremarkable. Normal differential. Hyperglycemia without DKA. Lactate on VBG elevated to 4.2, but on serum is wnl at 1.3.   EKG personally reviewed - NSR  CXR personally reviewed - clear lungs

## 2023-04-13 NOTE — H&P ADULT - ASSESSMENT
57 year old male with PMHx of CAD with stent (MI in 12/2021 s/p PCI to the LAD; in 3/2022 s/p PCI to the mRCA), DM2, HTN, and HLD who presents with angioedema.

## 2023-04-13 NOTE — DISCHARGE NOTE PROVIDER - HOSPITAL COURSE
57 year old male with PMHx of CAD with stent (MI in 12/2021 s/p PCI to the LAD; in 3/2022 s/p PCI to the mRCA), DM2, HTN, and HLD presented to ED with swelling of the lips. Pt was in his typical state of health until about 6 days ago when he developed squeezing substernal chest pain while moving bags of sand in his home. He took sublingual nitroglycerin x1 with little effect, then took a second dose with resolution of symptoms. Pt states he had never taken his PRN nitroglycerin prior to this. The following day, he developed gradual swelling of the lips, about equal on both sides, which worsened over time. His tongue also felt to be swollen making his speech slur. There is some itchiness around his mouth but otherwise no other rashes or itchy skin. He denies any dysphagia or odynophagia no shortness of breath. He never experienced similar symptoms in the past. Other than the nitroglycerin, there are no new medications. Of note, he is on losartan.     ED interventions: solumedrol 125mg IV x1, pepcid 20mg IV x1.    Pt states since ED interventions, he feels improved in that his tongue feels less swollen, by ~80%. Patient was transitioned to prednisone and endocrine was consulted due to steroid use with DM requiring high doses of insulin at baseline. Per endocrine, patient should take his Basaglar 30 units nightly. While patient is on Prednisone at home, patient should increase Humalog to 38 units TID. Once patient is off steroids, continue with Basaglar 30 units and decrease Humalog 32 units TID with meals. Continue with home Jardiance and Trulicity.    Discharge/Dispo/Med rec discussed with attending  ____. Patient medically cleared for discharge ____ with outpatient follow up with PCP in 1 week and Certified Diabetes Educator on 4/21/23 at 3pm at [address] [place] [address]     57 year old male with PMHx of CAD with stent (MI in 12/2021 s/p PCI to the LAD; in 3/2022 s/p PCI to the mRCA), DM2, HTN, and HLD presented to ED with swelling of the lips. Pt was in his typical state of health until about 6 days ago when he developed squeezing substernal chest pain while moving bags of sand in his home. He took sublingual nitroglycerin x1 with little effect, then took a second dose with resolution of symptoms. Pt states he had never taken his PRN nitroglycerin prior to this. The following day, he developed gradual swelling of the lips, about equal on both sides, which worsened over time. His tongue also felt to be swollen making his speech slur. There is some itchiness around his mouth but otherwise no other rashes or itchy skin. He denies any dysphagia or odynophagia no shortness of breath. He never experienced similar symptoms in the past. Other than the nitroglycerin, there are no new medications. Of note, he is on losartan.     ED interventions: solumedrol 125mg IV x1, pepcid 20mg IV x1.    Pt states since ED interventions, he feels improved in that his tongue feels less swollen, by ~80%. Patient was transitioned to prednisone and endocrine was consulted due to steroid use with DM requiring high doses of insulin at baseline. Per endocrine, patient should take his Basaglar 30 units nightly. While patient is on Prednisone at home, patient should increase Humalog to 38 units TID. Once patient is off steroids, continue with Basaglar 30 units and decrease Humalog 32 units TID with meals. Continue with home Jardiance and Trulicity.    Discharge/Dispo/Med rec discussed with attending Dr. Garcia. Patient medically cleared for discharge home with outpatient follow up with PCP in 1 week and Certified Diabetes Educator on 4/21/23 at 3pm at [address] [place] [address]

## 2023-04-13 NOTE — H&P ADULT - MOUTH
+angioedema on the upper and lower lips, about symmetric bilaterally. Angles of the mouth are cracked. Tongue appears grossly normal./moist

## 2023-04-13 NOTE — DISCHARGE NOTE PROVIDER - CARE PROVIDER_API CALL
SIOBHAN WOODS  Internal Medicine  N  PEGGY MCCANN, Phys,    Phone: [phone]  Fax: ([phone]  Follow Up Time: 1 week

## 2023-04-13 NOTE — DISCHARGE NOTE PROVIDER - NSFOLLOWUPCLINICSTOKEN_GEN_ALL_ED_FT
[id number]: ||4/21/2023||15\00\00||False; [id number]: ||4/21/2023||15\00\00||False;[id number]: || ||00\01||False;

## 2023-04-13 NOTE — H&P ADULT - PROBLEM SELECTOR PLAN 1
Angioedema of the lips and tongue, now improved after solumedrol and pepcid in the ED.  No associated urticaria.   No eosinophils on differential.  C/f possible drug induced (from nitroglycerin versus losartan)  - hold both nitroglycerin and losartan  - prednisone 40mg daily x 5 days  - monitor for symptom improvement

## 2023-04-13 NOTE — CONSULT NOTE ADULT - SUBJECTIVE AND OBJECTIVE BOX
HPI:  57 year old male with PMHx of CAD with stent (MI in 12/2021 s/p PCI to the LAD; in 3/2022 s/p PCI to the mRCA), DM2, HTN, and HLD who presents with swelling of the lips. Pt was in his typical state of health until about 6 days ago when he developed squeezing substernal chest pain while moving bags of sand in his home. He took sublingual nitroglycerin x1 with little effect, then took a second dose with resolution of symptoms. Pt states he had never taken his PRN nitroglycerin prior to this. The following day, he developed gradual swelling of the lips, about equal on both sides, which worsened over time. His tongue also felt to be swollen making his speech slur. There is some itchiness around his mouth but otherwise no other rashes or itchy skin. He denies any dysphagia or odynophagia no shortness of breath. He never experienced similar symptoms in the past. Other than the nitroglycerin, there are no new medications. Of note, he is on losartan.     ED interventions: solumedrol 125mg IV x1, pepcid 20mg IV x1.    Pt states since ED interventions, he feels improved in that his tongue feels less swollen, by like 80%.    (13 Apr 2023 04:42)      Endocrinology HPI    Diabetes Mellitus Type 2  Diagnosis: 34 years ago   Symptoms: denies any polyuria, polydipsia or blurry vision  Outpatient endocrinologist: Dr. Brielle Brown  Last HgbA1c: 10.2% in Dec 2022   Outpatient regimen: Basaglar 30 units nightly (has not been taking it for the past month because he thinks it's not working) + Humalog 33 units TID with meals + Jardiance 10mg daily (also mentions has not been taking it because it bothers his eyes?) + Trulicity 1.5mg weekly (increased one month ago from 0.75mg)  Blood sugars at home:   Patient is using freestyle teresa  Blood sugars have been running high at home mostly in the morning due to not taking basaglar   TIR 30%  high 38%  very high 32%  FH: Strong++ mother, siblings and daughter   Diet: not following up a diabetic diet at home  Exercise: Minimal   History of CAD/MI/Stroke: yes history of MI     Review of Systems:  Constitutional: No fever, good appetite/po intake  Eyes: No blurry vision, diplopia  Neuro: No tremors  HEENT: No pain  Cardiovascular: No chest pain, palpitations  Respiratory: No SOB, no cough  GI: No nausea, vomiting,   : No dysuria, hematuria  Skin: no rash  Psych: no depression  Endocrine: no polyuria, polydipsia  Hem/lymph: no swelling  Osteoporosis: no fractures    ALL OTHER SYSTEMS REVIEWED AND NEGATIVE    PHYSICAL EXAM:  VITALS: T(C): 36.6 (04-13-23 @ 10:57)  T(F): 97.9 (04-13-23 @ 10:57), Max: 98.6 (04-12-23 @ 16:44)  HR: 101 (04-13-23 @ 10:57) (90 - 106)  BP: 124/79 (04-13-23 @ 10:57) (109/79 - 128/82)  RR:  (16 - 20)  SpO2:  (9 4% - 98%)  Wt(kg): --  GENERAL: NAD, well-groomed, well-developed  EYES: No proptosis, extraocular movements intact,  no lid lag, anicteric  HEENT:  Atraumatic, Normocephalic, moist mucous membranes  THYROID: Normal size, no palpable nodules, no thyromegaly  RESPIRATORY: Clear to auscultation bilaterally; No rales, rhonchi, wheezing, or rubs  CARDIOVASCULAR: Regular rate and rhythm; No murmurs; no peripheral edema  GI: Soft, nontender, non distended, normal bowel sounds  SKIN: Dry, intact, No rashes or lesions  EXTREMITIES: No foot ulcers, distal pedal pulses intact bilaterally  NEURO: sensation intact, no tremors  PSYCH: reactive affect, euthymic mood  CUSHING'S SIGNS: no striae or visible bruising                              14.9   9.84  )-----------( 233      ( 12 Apr 2023 19:13 )             47.4       04-13    x   |  x   |  x   ----------------------------<  377<H>  x    |  x   |  x     eGFR: x     Ca    10.2      04-12    TPro  7.0  /  Alb  4.2  /  TBili  0.6  /  DBili  x   /  AST  19  /  ALT  44  /  AlkPhos  98  04-12      Thyroid Function Tests:          Radiology:

## 2023-04-13 NOTE — DISCHARGE NOTE PROVIDER - NSDCFUADDAPPT_GEN_ALL_CORE_FT
APPTS ARE READY TO BE MADE: [x] YES    Best Family or Patient Contact (if needed):    Additional Information about above appointments (if needed):    1:   2:   3:     Other comments or requests:    APPTS ARE READY TO BE MADE: [x] YES    Best Family or Patient Contact (if needed):    Additional Information about above appointments (if needed):    1:   2:   3:     Other comments or requests:   Patient was previously scheduled with Dr. SACHA GUALLPA on 04/21 3:00p at [address] [facility]  [id number]  Patient was previously scheduled with SIOBHAN Gonzalez on 04/13 7:00p at [id number] [place] [id number]. APPTS ARE READY TO BE MADE: [x] YES    Best Family or Patient Contact (if needed):    Additional Information about above appointments (if needed):    1: pcp  2: endo   Advised patient that he needs to start taking his Basaglar 30 units nightly. While patient is on Prednisone at home, patient should increase Humalog to 38 units TID. Once patient is off steroids, continue with Basaglar 30 units and decrease Humalog 32 units TID with meals. Continue with home Jardiance and Trulicity.    Patient has appointment with our Certified Diabetes Educator on 4/21/23 at 3pm at [address] [place] [address]. [place], [place]  3:     Other comments or requests:   Patient was previously scheduled with Dr. SACHA GUALLPA on 04/21 3:00p at [address] [facility]  [id number]  Patient was previously scheduled with SIOBHAN Gonzalez on 04/13 7:00p at [id number] [place] [address]. APPTS ARE READY TO BE MADE: [x] YES    Best Family or Patient Contact (if needed):    Additional Information about above appointments (if needed):    1: pcp  2: endo   Advised patient that he needs to start taking his Basaglar 30 units nightly. While patient is on Prednisone at home, patient should increase Humalog to 38 units TID. Once patient is off steroids, continue with Basaglar 30 units and decrease Humalog 32 units TID with meals. Continue with home Jardiance and Trulicity.    Patient has appointment with our Certified Diabetes Educator on 4/21/23 at 3pm at [address] [place] [address]. [place], [place]  3:     Other comments or requests:   Patient was previously scheduled with Dr. SACHA GUALLPA on 04/21 3:00p at [address] [place] [address]  Patient was previously scheduled with SIOBHAN Gonzalez on 04/13 7:00p at [address] [place] [phone].  Patient was scheduled for [facility] Allergy/Immunology with Dr. Rodgers at 4:00pm on 7/27/23 at [address] [place] [phone] pending sooner availability. APPTS ARE READY TO BE MADE: [x] YES    Best Family or Patient Contact (if needed):    Additional Information about above appointments (if needed):    1: pcp  2: endo   Advised patient that he needs to start taking his Basaglar 30 units nightly. While patient is on Prednisone at home, patient should increase Humalog to 38 units TID. Once patient is off steroids, continue with Basaglar 30 units and decrease Humalog 32 units TID with meals. Continue with home Jardiance and Trulicity.    Patient has appointment with our Certified Diabetes Educator on 4/21/23 at 3pm at [address] [place] [address]  3:     Other comments or requests:   Patient was previously scheduled with Dr. SACHA GUALLPA on 04/21 3:00p at [address] [place] [address]  [id number]  Patient was previously scheduled with SIOBHAN Gonzalez on 04/13 7:00p at [address] [place] [id number].  Patient was scheduled for [facility] Allergy/Immunology with Dr. Boxer at 4:30pm on 5/8/23 at [address] [place] [address]

## 2023-04-13 NOTE — DISCHARGE NOTE NURSING/CASE MANAGEMENT/SOCIAL WORK - NSDCPEFALRISK_GEN_ALL_CORE
For information on Fall & Injury Prevention, visit: [email or web address] OR  [email or web address] OR  https://www.cdc.gov/steadi/patient.html

## 2023-04-13 NOTE — CONSULT NOTE ADULT - ASSESSMENT
A/P:57 year old male with PMHx of CAD with stent (MI in 12/2021 s/p PCI to the LAD; in 3/2022 s/p PCI to the mRCA), DM2, HTN, and HLD who presents with swelling of the lips found to have angioedema treated with steroids.  Endocrinology was consulted for management of diabetes mellitus.    #Type 2 Diabetes Mellitus  - Patient with long standing type 2 DM, follows with Dr. Brown outaptient   - Admits to not taking his Basaglar at home because he thinks it's not working   - HbA1c 10.2% in Dec 2022   ; home regimen: Basaglar 30 units nightly (has not been taking it for the past month because he thinks it's not working) + Humalog 33 units TID with meals + Jardiance 10mg daily (also mentions has not been taking it because it bothers his eyes?) + Trulicity 1.5mg weekly (increased one month ago from 0.75mg)  -egfr: 72  - He received a dose of methylprednisone and is now on prednisone 40mg which is likely worsening his blood sugars.     Plan:   While patient is in the hospital:  - Recommend 30 units of lantus QHS  - Recommend 35 units of Admelog TIDQAC  - Recommend  moderate Admelog correction scale TIDQAC and QHS  - Please check FSG before meals and QHS, or q6h while NPO  - Inpatient glucose goal 100-180   - Please keep patient on a diabetic, carb controlled diet   - hypoglycemia orderset prn    - Discharge planning: Advised patient that he needs to start taking his Basaglar 30 units nightly. While patient is on Prednisone at home, patient should increase Humalog to 38 units TID. Once patient is off steroids, continue with Basaglar 30 units and decrease Humalog 32 units TID with meals. Continue with home Jardiance and Trulicity.    Patient has appointment with our Certified Diabetes Educator on 4/21/23 at 3pm at [address] [place] [address]    #Hypertension  - BP goal <130/80  - Management as per primary team  - Patient is on Losartan outpatient     #Hyperlipidemia  - Patient is on Rosuvastatin 10mg daily at home  - Can resume inpatient unless any contraindications  - LDL goal <70    Love Dove DO  Attending Physician   Department of Endocrinology, Diabetes and Metabolism     If before 9AM or after 5PM, or on weekends/holidays, please call the Endocrine answering service for assistance ([phone]).  For nonurgent matters, please email [email or web address] for assistance.     Please note that a different provider may be following this patient each day.

## 2023-04-13 NOTE — DISCHARGE NOTE NURSING/CASE MANAGEMENT/SOCIAL WORK - NSDCFUADDAPPT_GEN_ALL_CORE_FT
APPTS ARE READY TO BE MADE: [x] YES    Best Family or Patient Contact (if needed):    Additional Information about above appointments (if needed):    1: pcp  2: endo   Advised patient that he needs to start taking his Basaglar 30 units nightly. While patient is on Prednisone at home, patient should increase Humalog to 38 units TID. Once patient is off steroids, continue with Basaglar 30 units and decrease Humalog 32 units TID with meals. Continue with home Jardiance and Trulicity.    Patient has appointment with our Certified Diabetes Educator on 4/21/23 at 3pm at [address] [place] [address]. [place], [place]  3:     Other comments or requests:   Patient was previously scheduled with Dr. SACHA GUALLPA on 04/21 3:00p at [address] [facility]  [id number]  Patient was previously scheduled with SIOBHAN Gonzalez on 04/13 7:00p at [id number] [place] [phone].

## 2023-04-13 NOTE — DISCHARGE NOTE PROVIDER - NSDCCPCAREPLAN_GEN_ALL_CORE_FT
PRINCIPAL DISCHARGE DIAGNOSIS  Diagnosis: Angioedema of lips  Assessment and Plan of Treatment: Please continue steroids as prescribed along with diabetic regimen as advised during and after steriod course:  Start taking Basaglar 30 units nightly.   While patient is on Prednisone at home, increase Humalog to 38 units three times a day with meals.    Once finished with prednisone, continue with Basaglar 30 units nightly and decrease Humalog 32 units three times a day with meals.      SECONDARY DISCHARGE DIAGNOSES  Diagnosis: Hyperlipidemia  Assessment and Plan of Treatment: Low salt, low fat, low cholesterol, diabetic diet   Continue medication as prescribed  Exercise, increase your activity level      Diagnosis: Hypertension  Assessment and Plan of Treatment: Continue to follow a low salt/sodium diet.  Perform physical activities as tolerated in consultation with your Primary Care Provider and physical therapist.  Take all medications as prescribed.  Follow up with your medical doctor for routine blood pressure monitoring at your next visit.  Notify your doctor if you have any of the following symptoms:  Dizziness, lightheadedness, blurry vision, headache, chest pain, or shortness of breath.      Diagnosis: Type 2 diabetes mellitus with hyperglycemia  Assessment and Plan of Treatment: Please follow up with your appointment with Certified Diabetes Educator on 4/21/23 at 3pm at [address] [place] [address]  Make sure you get your HgA1c checked every three months.  If you take oral diabetes medications, check your blood glucose at least two times a day.  If you take short-acting insulin, check your blood glucose before meals and at bedtime.  It's important not to skip any meals.  Keep a log of your blood glucose results and always take it with you to your doctor appointments.  Keep a list of your current medications including over the counter medications and bring this medication list with you to all your doctor appointments.  If you have not seen your ophthalmologist this year, call for appointment.  Check your feet daily for redness, sores, or openings.  Do not self treat.  If there is no improvement in two days, call your primary care physician for an appointment.  HgA1c on 12/22/21 was 9.1      Diagnosis: CAD (coronary artery disease)  Assessment and Plan of Treatment: Coronary artery disease is a condition where the arteries the supply the heart muscle get clogged with fatty deposits & puts you at risk for a heart attack.  Call your doctor if you have any new pain, pressure, or discomfort in the center of your chest, pain, tingling or discomfort in arms, back, neck, jaw, or stomach, shortness of breath, nausea, vomiting, burping or heartburn, sweating, cold and clammy skin, racing or abnormal heartbeat for more than 10 minutes or if they keep coming & going.  Call 911 and do not try to get to hospital by car.  You can help yourself with lifestyle changes (quitting smoking if you If you are on medication to help you quit smoking, be sure to take it as prescribed. Find healthy ways to deal with stress, such as exercise (check with your healthcare provider first), deep breathing, meditation, or enjoyable healthy hobbies.  Avoid situations that may cause you to smoke a cigarette.  Look for help with quitting; you are not alone. A resource to help you stop smoking is the [facility] – phone number 062-321-9288.), eat lots of fruits & vegetables & low fat dairy products, not a lot of meat & fatty foods, walk or some form of physical activity most days of the week, lose weight if you are overweight.  Take your cardiac medication as prescribed to lower cholesterol, to lower blood pressure, and control your blood sugar.

## 2023-04-13 NOTE — H&P ADULT - HISTORY OF PRESENT ILLNESS
57 year old male with PMHx of CAD with stent, DM2, HTN, and HLD who presents with  57 year old male with PMHx of CAD with stent (MI in 12/2021 s/p PCI to the LAD; in 3/2022 s/p PCI to the mRCA), DM2, HTN, and HLD who presents with swelling of the lips. Pt was in his typical state of health until about 6 days ago when he developed squeezing substernal chest pain while moving bags of sand in his home. He took sublingual nitroglycerin x1 with little effect, then took a second dose with resolution of symptoms. Pt states he had never taken his PRN nitroglycerin prior to this. The following day, he developed gradual swelling of the lips, about equal on both sides, which worsened over time. His tongue also felt to be swollen making his speech slur. There is some itchiness around his mouth but otherwise no other rashes or itchy skin. He denies any dysphagia or odonophagia, no shortness of breath. He never experienced similar symptoms in the past. Other than the nitroglycerin, there are no new medications. Of note, he is on losartan.     ED interventions: solumedrol 125mg IV x1, pepcid 20mg IV x1.    Pt states since ED interventions, he feels improved in that his tongue feels less swollen, by like 80%.    57 year old male with PMHx of CAD with stent (MI in 12/2021 s/p PCI to the LAD; in 3/2022 s/p PCI to the mRCA), DM2, HTN, and HLD who presents with swelling of the lips. Pt was in his typical state of health until about 6 days ago when he developed squeezing substernal chest pain while moving bags of sand in his home. He took sublingual nitroglycerin x1 with little effect, then took a second dose with resolution of symptoms. Pt states he had never taken his PRN nitroglycerin prior to this. The following day, he developed gradual swelling of the lips, about equal on both sides, which worsened over time. His tongue also felt to be swollen making his speech slur. There is some itchiness around his mouth but otherwise no other rashes or itchy skin. He denies any dysphagia or odynophagia no shortness of breath. He never experienced similar symptoms in the past. Other than the nitroglycerin, there are no new medications. Of note, he is on losartan.     ED interventions: solumedrol 125mg IV x1, pepcid 20mg IV x1.    Pt states since ED interventions, he feels improved in that his tongue feels less swollen, by like 80%.

## 2023-04-13 NOTE — DISCHARGE NOTE PROVIDER - NSDCMRMEDTOKEN_GEN_ALL_CORE_FT
aspirin 81 mg oral tablet, chewable: 1 tab(s) orally once a day  insulin lispro 100 units/mL subcutaneous solution: 35 subcutaneous 3 times a day (with meals)  losartan 25 mg oral tablet: 1 tab(s) orally once a day  Metoprolol Succinate ER 50 mg oral tablet, extended release: 1 tab(s) orally once a day  rosuvastatin 10 mg oral tablet: 1 tab(s) orally once a day  Trulicity Pen 0.75 mg/0.5 mL subcutaneous solution: subcutaneous once a week  Vitamin D3 25 mcg (1000 intl units) oral tablet: 1 tab(s) orally once a day   aspirin 81 mg oral tablet, chewable: 1 tab(s) orally once a day  Basaglar KwikPen 100 units/mL subcutaneous solution: 38 unit(s) subcutaneous once a day (at bedtime) 38 unit(s) subcutaneous once a day return to home dose 30u when completed steriods  HumaLOG KwikPen 100 units/mL injectable solution: 32 unit(s) injectable 3 times a day (before meals) 32 unit(s) subcutaneous 3 times a day (with meals) after steriods  losartan 25 mg oral tablet: 1 tab(s) orally once a day  Metoprolol Succinate ER 50 mg oral tablet, extended release: 1 tab(s) orally once a day  predniSONE 20 mg oral tablet: 2 tab(s) orally once a day  rosuvastatin 10 mg oral tablet: 1 tab(s) orally once a day  Trulicity Pen 0.75 mg/0.5 mL subcutaneous solution: subcutaneous once a week  Vitamin D3 25 mcg (1000 intl units) oral tablet: 1 tab(s) orally once a day

## 2023-04-13 NOTE — DISCHARGE NOTE PROVIDER - NSFOLLOWUPCLINICS_GEN_ALL_ED_FT
[facility] Endocrinology  Endocrinology  [address] [place] [address]  Phone: [phone]  Fax:   Scheduled Appointment: 4/21/2023 3:00 PM     [facility] Endocrinology  Endocrinology  [address] [place] [address]  Phone: [phone]  Fax:   Scheduled Appointment: 4/21/2023 3:00 PM    [facility] Allergy and Immunology  Allergy  [address] [place] [address]  Phone: [phone]  Fax:

## 2023-04-13 NOTE — H&P ADULT - PROBLEM SELECTOR PLAN 2
With hyperglycemia, in the setting of steroids  - pt is on a regimen of insulin lispro 35units TID with meals. Previously on lantus but pt self-discontinued  - start lantus 20 units qAM and admelog 20 units TID with meals with admelog sliding scale qAC and qHS  - f/u hgb a1c  - endocrine consult (consult team is emailed)

## 2023-04-13 NOTE — DISCHARGE NOTE NURSING/CASE MANAGEMENT/SOCIAL WORK - PATIENT PORTAL LINK FT
You can access the FollowMyHealth Patient Portal offered by [facility] by registering at the following website: [email or web address]. By joining [facility]’s FollowMyHealth portal, you will also be able to view your health information using other applications (apps) compatible with our system.

## 2023-04-13 NOTE — CHART NOTE - NSCHARTNOTEFT_GEN_A_CORE
Second touch note    Briefly, patient is a 57M PMH CAD, DM2 on insulin, HTN, HLD, NSTEMI, presented to the ED with lip swelling following taking nitroglycerin, which is now improving after initiation of steroids.    Plan  - Continue with prednisone as ordered  - Endocrine consulted due to steroid use with DM requiring high doses of insulin at baseline, will follow up recommendations  - If BGL under control, plan for discharge later today    Remainder as per H/P

## 2023-04-13 NOTE — H&P ADULT - NSHPADDITIONALINFOADULT_GEN_ALL_CORE
Pt did not know his full medication list. Meds started on discharge med list from 12/2022. Pt's daughter to bring in list in the AM; please follow up.

## 2023-04-13 NOTE — H&P ADULT - NSHPPHYSICALEXAM_GEN_ALL_CORE
Vital Signs Last 24 Hrs  T(C): 36.6 (13 Apr 2023 04:33), Max: 37 (12 Apr 2023 16:44)  T(F): 97.8 (13 Apr 2023 04:33), Max: 98.6 (12 Apr 2023 16:44)  HR: 106 (13 Apr 2023 04:33) (90 - 106)  BP: 120/65 (13 Apr 2023 04:33) (109/79 - 122/73)  BP(mean): --  RR: 18 (13 Apr 2023 04:33) (16 - 20)  SpO2: 98% (13 Apr 2023 04:33) (97% - 98%)    Parameters below as of 13 Apr 2023 04:33  Patient On (Oxygen Delivery Method): room air

## 2023-04-25 ENCOUNTER — APPOINTMENT (OUTPATIENT)
Dept: CARDIOLOGY | Facility: CLINIC | Age: 58
End: 2023-04-25
Payer: COMMERCIAL

## 2023-04-25 ENCOUNTER — NON-APPOINTMENT (OUTPATIENT)
Age: 58
End: 2023-04-25

## 2023-04-25 VITALS
SYSTOLIC BLOOD PRESSURE: 130 MMHG | HEART RATE: 113 BPM | BODY MASS INDEX: 31.5 KG/M2 | HEIGHT: 71 IN | WEIGHT: 225 LBS | DIASTOLIC BLOOD PRESSURE: 85 MMHG | OXYGEN SATURATION: 97 %

## 2023-04-25 PROCEDURE — 93000 ELECTROCARDIOGRAM COMPLETE: CPT

## 2023-04-25 PROCEDURE — 99215 OFFICE O/P EST HI 40 MIN: CPT

## 2023-05-01 ENCOUNTER — APPOINTMENT (OUTPATIENT)
Dept: CARDIOLOGY | Facility: CLINIC | Age: 58
End: 2023-05-01
Payer: COMMERCIAL

## 2023-05-01 PROCEDURE — 93797 PHYS/QHP OP CAR RHAB WO ECG: CPT

## 2023-05-03 ENCOUNTER — APPOINTMENT (OUTPATIENT)
Dept: CARDIOLOGY | Facility: CLINIC | Age: 58
End: 2023-05-03
Payer: COMMERCIAL

## 2023-05-03 PROCEDURE — 93797 PHYS/QHP OP CAR RHAB WO ECG: CPT

## 2023-05-04 ENCOUNTER — APPOINTMENT (OUTPATIENT)
Dept: CARDIOLOGY | Facility: CLINIC | Age: 58
End: 2023-05-04
Payer: COMMERCIAL

## 2023-05-04 PROCEDURE — 93797 PHYS/QHP OP CAR RHAB WO ECG: CPT

## 2023-05-04 RX ORDER — SEMAGLUTIDE 0.68 MG/ML
2 INJECTION, SOLUTION SUBCUTANEOUS
Qty: 5 | Refills: 3 | Status: DISCONTINUED | COMMUNITY
Start: 2023-04-27 | End: 2023-05-04

## 2023-05-04 RX ORDER — DULAGLUTIDE 1.5 MG/.5ML
1.5 INJECTION, SOLUTION SUBCUTANEOUS
Qty: 5 | Refills: 3 | Status: DISCONTINUED | COMMUNITY
Start: 2022-09-09 | End: 2023-05-04

## 2023-05-07 ENCOUNTER — NON-APPOINTMENT (OUTPATIENT)
Age: 58
End: 2023-05-07

## 2023-05-08 ENCOUNTER — NON-APPOINTMENT (OUTPATIENT)
Age: 58
End: 2023-05-08

## 2023-05-08 ENCOUNTER — APPOINTMENT (OUTPATIENT)
Dept: ALLERGY | Facility: CLINIC | Age: 58
End: 2023-05-08
Payer: COMMERCIAL

## 2023-05-08 VITALS
BODY MASS INDEX: 31.5 KG/M2 | RESPIRATION RATE: 16 BRPM | HEIGHT: 71 IN | SYSTOLIC BLOOD PRESSURE: 134 MMHG | WEIGHT: 225 LBS | TEMPERATURE: 98.1 F | HEART RATE: 116 BPM | DIASTOLIC BLOOD PRESSURE: 80 MMHG | OXYGEN SATURATION: 97 %

## 2023-05-08 PROCEDURE — 99204 OFFICE O/P NEW MOD 45 MIN: CPT

## 2023-05-08 RX ORDER — TICAGRELOR 90 MG/1
90 TABLET ORAL
Qty: 180 | Refills: 2 | Status: DISCONTINUED | COMMUNITY
Start: 2021-12-28 | End: 2023-05-08

## 2023-05-08 NOTE — SOCIAL HISTORY
[House] : [unfilled] lives in a house  [Cat] : cat [] :  [FreeTextEntry1] : Lives with spouse\par Unemployed  [Smokers in Household] : there are no smokers in the home [de-identified] : x2

## 2023-05-08 NOTE — PHYSICAL EXAM
[Alert] : alert [Well Nourished] : well nourished [Healthy Appearance] : healthy appearance [No Acute Distress] : no acute distress [Well Developed] : well developed [Normal Voice/Communication] : normal voice communication [Normal Lips/Tongue] : the lips and tongue were normal [Normal Tonsils] : normal tonsils [No Neck Mass] : no neck mass was observed [No LAD] : no lymphadenopathy [No Thyroid Mass] : no thyroid mass [Supple] : the neck was supple [Normal Rate and Effort] : normal respiratory rhythm and effort [No Crackles] : no crackles [No Retractions] : no retractions [Normal Rate] : heart rate was normal  [Normal S1, S2] : normal S1 and S2 [Regular Rhythm] : with a regular rhythm [Normal Cervical Lymph Nodes] : cervical [No Cyanosis] : no cyanosis [Normal Mood] : mood was normal [Normal Affect] : affect was normal [Judgment and Insight Age Appropriate] : judgement and insight is age appropriate [Alert, Awake, Oriented as Age-Appropriate] : alert, awake, oriented as age appropriate [Wheezing] : no wheezing was heard

## 2023-05-08 NOTE — REVIEW OF SYSTEMS
[Nl] : Genitourinary [FreeTextEntry3] : retinopathy  [FreeTextEntry5] : coronary artery disease - HTN - elevated cholesterol  [de-identified] : diabetes

## 2023-05-08 NOTE — ASSESSMENT
[FreeTextEntry1] : Angioedema most likely secondary to Losartan.   I have recommended that he discontinue this medical therapy and be switched to an alternative blood pressure medication (no ACE or ARB medications).  He will contact Dr. Kay tomorrow for a change in his medical therapy.   The episode of angioedema was not secondary to nitroglycerin, since his symptoms started almost 24 hours after taking this medical therapy.  \par \par I have advised patient and his daughter - that he may continue to experience episodes of angioedema despite stopping Losartan for up to a few months - if he has any further upper airway swelling episodes he has been advised to go to the emergency room for evaluation.

## 2023-05-09 ENCOUNTER — APPOINTMENT (OUTPATIENT)
Dept: CARDIOLOGY | Facility: CLINIC | Age: 58
End: 2023-05-09
Payer: COMMERCIAL

## 2023-05-09 PROCEDURE — 99214 OFFICE O/P EST MOD 30 MIN: CPT | Mod: 95

## 2023-05-16 RX ORDER — METOPROLOL SUCCINATE 50 MG/1
50 TABLET, EXTENDED RELEASE ORAL
Qty: 90 | Refills: 3 | Status: DISCONTINUED | COMMUNITY
Start: 2021-12-28 | End: 2023-05-16

## 2023-05-18 ENCOUNTER — APPOINTMENT (OUTPATIENT)
Dept: CARDIOLOGY | Facility: CLINIC | Age: 58
End: 2023-05-18

## 2023-05-22 ENCOUNTER — APPOINTMENT (OUTPATIENT)
Dept: CARDIOLOGY | Facility: CLINIC | Age: 58
End: 2023-05-22
Payer: COMMERCIAL

## 2023-05-22 PROCEDURE — 93797 PHYS/QHP OP CAR RHAB WO ECG: CPT

## 2023-05-24 ENCOUNTER — APPOINTMENT (OUTPATIENT)
Dept: CARDIOLOGY | Facility: CLINIC | Age: 58
End: 2023-05-24
Payer: COMMERCIAL

## 2023-05-24 PROCEDURE — 93797 PHYS/QHP OP CAR RHAB WO ECG: CPT

## 2023-05-25 ENCOUNTER — APPOINTMENT (OUTPATIENT)
Dept: CARDIOLOGY | Facility: CLINIC | Age: 58
End: 2023-05-25
Payer: COMMERCIAL

## 2023-05-25 PROCEDURE — 93797 PHYS/QHP OP CAR RHAB WO ECG: CPT

## 2023-05-31 NOTE — ED PROVIDER NOTE - IV ALTEPLASE INCLUSION HIDDEN
ALENDRONATE 70MG TAB:   aspirin 81 mg oral tablet: 1 orally once a day  GABAPENTIN 100MG CAP:   HYDROCHLOROT 25MG TAB:   LOSARTAN 100MG TAB:   METOPROL TAR 25MG TAB:   SIMVASTATIN 20MG TAB: 1 tab(s) orally once a day   show

## 2023-06-01 ENCOUNTER — APPOINTMENT (OUTPATIENT)
Dept: CARDIOLOGY | Facility: CLINIC | Age: 58
End: 2023-06-01
Payer: COMMERCIAL

## 2023-06-04 ENCOUNTER — NON-APPOINTMENT (OUTPATIENT)
Age: 58
End: 2023-06-04

## 2023-06-06 ENCOUNTER — NON-APPOINTMENT (OUTPATIENT)
Age: 58
End: 2023-06-06

## 2023-06-14 ENCOUNTER — APPOINTMENT (OUTPATIENT)
Dept: CARDIOLOGY | Facility: CLINIC | Age: 58
End: 2023-06-14

## 2023-06-15 ENCOUNTER — APPOINTMENT (OUTPATIENT)
Dept: CARDIOLOGY | Facility: CLINIC | Age: 58
End: 2023-06-15
Payer: COMMERCIAL

## 2023-06-15 VITALS
HEIGHT: 71 IN | HEART RATE: 89 BPM | DIASTOLIC BLOOD PRESSURE: 76 MMHG | BODY MASS INDEX: 31.78 KG/M2 | OXYGEN SATURATION: 98 % | SYSTOLIC BLOOD PRESSURE: 120 MMHG | WEIGHT: 227 LBS

## 2023-06-15 PROCEDURE — 99215 OFFICE O/P EST HI 40 MIN: CPT

## 2023-06-15 RX ORDER — EMPAGLIFLOZIN 10 MG/1
10 TABLET, FILM COATED ORAL DAILY
Qty: 90 | Refills: 3 | Status: DISCONTINUED | COMMUNITY
Start: 2022-10-06 | End: 2023-06-15

## 2023-06-15 RX ORDER — EPINEPHRINE 1 MG/ML
1 INJECTION, SOLUTION INTRAMUSCULAR; SUBCUTANEOUS
Qty: 1 | Refills: 0 | Status: ACTIVE | COMMUNITY
Start: 2023-06-15 | End: 1900-01-01

## 2023-06-15 RX ORDER — ISOSORBIDE MONONITRATE 30 MG/1
30 TABLET, EXTENDED RELEASE ORAL DAILY
Qty: 30 | Refills: 5 | Status: DISCONTINUED | COMMUNITY
Start: 2023-05-09 | End: 2023-06-15

## 2023-06-22 ENCOUNTER — APPOINTMENT (OUTPATIENT)
Dept: CARDIOLOGY | Facility: CLINIC | Age: 58
End: 2023-06-22

## 2023-06-22 ENCOUNTER — APPOINTMENT (OUTPATIENT)
Dept: ENDOCRINOLOGY | Facility: CLINIC | Age: 58
End: 2023-06-22
Payer: COMMERCIAL

## 2023-06-22 ENCOUNTER — APPOINTMENT (OUTPATIENT)
Dept: ENDOCRINOLOGY | Facility: CLINIC | Age: 58
End: 2023-06-22

## 2023-06-22 VITALS
OXYGEN SATURATION: 98 % | HEIGHT: 71 IN | DIASTOLIC BLOOD PRESSURE: 70 MMHG | HEART RATE: 97 BPM | BODY MASS INDEX: 31.78 KG/M2 | WEIGHT: 227 LBS | SYSTOLIC BLOOD PRESSURE: 118 MMHG

## 2023-06-22 LAB
GLUCOSE BLDC GLUCOMTR-MCNC: 212
HBA1C MFR BLD HPLC: 8.8

## 2023-06-22 PROCEDURE — 82962 GLUCOSE BLOOD TEST: CPT

## 2023-06-22 PROCEDURE — 83036 HEMOGLOBIN GLYCOSYLATED A1C: CPT | Mod: QW

## 2023-06-22 PROCEDURE — 95251 CONT GLUC MNTR ANALYSIS I&R: CPT

## 2023-06-22 PROCEDURE — 99214 OFFICE O/P EST MOD 30 MIN: CPT | Mod: 25

## 2023-06-22 RX ORDER — BLOOD-GLUCOSE METER
W/DEVICE KIT MISCELLANEOUS
Qty: 1 | Refills: 0 | Status: ACTIVE | COMMUNITY
Start: 2023-06-22 | End: 1900-01-01

## 2023-06-22 RX ORDER — INSULIN GLARGINE 100 [IU]/ML
100 INJECTION, SOLUTION SUBCUTANEOUS
Qty: 5 | Refills: 3 | Status: DISCONTINUED | COMMUNITY
Start: 2022-09-02 | End: 2023-06-22

## 2023-06-22 RX ORDER — LANCETS 33 GAUGE
EACH MISCELLANEOUS
Qty: 1 | Refills: 3 | Status: ACTIVE | COMMUNITY
Start: 2023-06-22 | End: 1900-01-01

## 2023-06-22 NOTE — ASSESSMENT
[FreeTextEntry1] : Patient is a 58 M with history of HTN, HL,T2DM, CAD S/p MI in [facility] 12/2021, s/p PCI>pLAD (residual pRCA 70% and Diag 70%), s/p PCI mRCA in , TTE-, EF 30-35% HFrEF here for follow up. Patient is accompanied by his daughter. \par \par 1. T2D complicated by CAD, retinopathy \par - HgB A1C: 10.2 (improved from 11.6 last visit), we discussed improvement, but not yet reached goal of 7%\par - Complications: CAD, retinopathy\par - Aspirin: yes\par - Most recent urine microalbumin 156 09/2022   . ACE-I/ARB: yes losartan 25 mg daily and Jardiance 10 mg daily \par - Most recent LDL: 91  . On statin: yes rosuvastatin 10 mg daily, cardiology started PCSK9 inhibitor \par - Opthalmology up to date: yes has retinopathy,  advised for yearly check up\par - Podiatry up to date: no advised for yearly check up\par - Patient to call for persistent glucose < 70 or > 300\par - Patient counseled on the importance of consistent carbohydrate diet and regular physical activity \par - Advised patient to continue checking FSG and to bring meter to all visits \par - Symptoms of hypoglycemia discussed\par - Medications changes:\par - Overall glucose has improved, but not at goal. Based on his CGM report, he appears to have fasting hyperglycemia which likely related to him stopped taking basaglar. Resume lantus at 40 units QHS \par - In terms of prandial glucose, humalog 40 units usually maintain his glucose for breakfast and lunch, would continue. Increase humalog to 45 units before dinner. \par - Increase Mounjaro to 7.5 mg weekly (with history of retinopathy, will need slow titration to avoid worsening retinopathy) \par - Encourage the continued use of CGM \par  \par 2. HTN\par - BP today 118/70\par - Continue with Losartan 25 mg daily and Metoprolol 25 mg BID\par  \par 3. HLD\par - Most recent LDL 91, goal should  be <50-70 in the setting of history of MI\par - Continue with rosuvastatin 10 mg QOD due to history of statin intolerance \par - Managing by Dr. Kay from cardiology, starting on PCSK9 inhibitor due to high ASCVD risk \par \par 4. Obesity with BMI 30\par - Mild weight loss from low dose Trulicity \par - Thus switched to Mounarjo, titrate up dose as above \par \par FOLLOW UP: I recommend that next visit for diabetes care be in 4 month\par \par To do at next clinic visit\par - Titrate insulin based on finger stick. Current insulin requirement is quite high, may consider U500 soon if >200 TDD of insulin \par - SGLT-2, or GLP1 agonist titration \par \par Brielle Brown MD\par Endocrinology, diabetes and metabolism

## 2023-06-22 NOTE — HISTORY OF PRESENT ILLNESS
[FreeTextEntry1] : Patient is a 58 M with history of HTN, HL,T2DM, CAD S/p MI in [facility] 12/2021, s/p PCI>pLAD (residual pRCA 70% and Diag 70%), s/p PCI mRCA in , TTE-, EF 30-35% HFrEF here for follow up. Last visit 03/03/2023. Patient is accompanied by his daughter. \par \par FH: mother and siblings have diabetes\par SH: smokes weed, not cigarettes, does not drink alcohol \par Retired , lives with his wife who does most of the cooking\par \par # T2DM:\par Diabetes history:\par Diagnosed when he was 25 years old. Diagnosed with T2D when he went to his PCP, had blurry vision, polyuria, polydipsia. Has been on insulin for 30 years. \par \par Most recent A1C 11.5 09/2022-->10.2 12/9/2022 --> 8.8 06/22/2023\par \par Diabetes complications:\par Neuropathy: occasional numbness in finger tips\par Retinopathy:  proliferative retinopathy in both eyes, seeing optho regularly, got injection in both eyes \par Nephropathy: unknown, last CR 1.14, GFR 76 03/2022 \par CAD/MI/CVA: yes had MI in 12/2021\par \par Current DM medications: \par - Basaglar 30 units QHS does not take it\par - Mounjaro 5 mg weekly \par - Humalog 40 units before each meal. Most often skip lunch doses because he does not eat a big lunch\par \par Past DM medications: \par - Told to stop Metformin because of fatty liver by his PCP 20 years ago, never had GI issue with metformin \par - Previously on lantus and admelog\par \par Finger sticks:\par CGM interpretation for dates: 06/09/2023-06/22/2023\par TIR 40% \par High 49%\par Very high 11 %\par Low 0%\par Very low 0%\par Glucose variabiltiy 26.9%\par GMI 7.9%\par Average glucose: 193\par Pattern: fasting hyperglycemia and postprandial hyperglycemia with dinner. \par \par Diet: \par Wife is cooking now but patient feels that when daughter is around, he usually has better diet \par Breakfast 8-9 am: spread bread and jelly, oatmeal or eggs \par Lunch 4-5 pm: rice with some tafoya, sometimes skips\par Dinner 9-10 pm: rice/spaghetti/roti, protein and vegetables, tafoya\par Snacks: protein bars, almonds/walnuts/pistachios, sugar free cookies. \par Drinks: diet pepsi, water, coffee with no sugar and almond milk, no juice\par Exercise: no exercise. used to exercise lifting weight before MI but now has pain when he does that\par \par \par # HTN\par BP today 118/70\par On losartan 25 mg daily and Metoprolol 25 mg BID\par \par # HLD\par - LDL 91 09/2022\par - On rosuvastatin 10 mg daily \par - Started PCSK9 inhibitor by cardiology \par \par #CAD\par - On aspirin and brilinta \par - Denies chest pain or palpitations\par \par 9/2/2022 visit events: after having MI, feels unmotivated to do anything. Stopped cooking his meals and stopped exercise. Now wishes to get back on track. \par \par 12/9/2022 visit events: eats dinner around 8-9 pm every day. Patient lost a little weight since last visit. He says for [place] and world cup, he has been snacking a lot mostly on chocolates, cakes and other junk food. Switched to humulin because he feels like basaglar is not working for him. \par \par 3/3/2023 visit events: overall feeling better. Lost about 5 pounds since last time. Feels like basaglar does not work for him because he wakes up with fasting glucose in the 200s. He usually eats a very late dinner around 10 pm (and dinner is a decent size meal). Occasional would eat a cake for snack at bed time. Lunch is usually small and is around 3-4 pm. \par \par 6/22/2023 visit events: feels like basaglar is not working, so stopped taking basaglar.

## 2023-06-27 ENCOUNTER — APPOINTMENT (OUTPATIENT)
Dept: CARDIOLOGY | Facility: CLINIC | Age: 58
End: 2023-06-27
Payer: COMMERCIAL

## 2023-06-28 RX ORDER — BLOOD-GLUCOSE METER
W/DEVICE EACH MISCELLANEOUS
Qty: 1 | Refills: 0 | Status: ACTIVE | COMMUNITY
Start: 2023-06-28 | End: 1900-01-01

## 2023-06-28 RX ORDER — BLOOD SUGAR DIAGNOSTIC
STRIP MISCELLANEOUS
Qty: 400 | Refills: 3 | Status: ACTIVE | COMMUNITY
Start: 2023-06-22 | End: 1900-01-01

## 2023-07-03 ENCOUNTER — APPOINTMENT (OUTPATIENT)
Dept: CARDIOLOGY | Facility: CLINIC | Age: 58
End: 2023-07-03
Payer: COMMERCIAL

## 2023-07-06 ENCOUNTER — NON-APPOINTMENT (OUTPATIENT)
Age: 58
End: 2023-07-06

## 2023-07-06 ENCOUNTER — APPOINTMENT (OUTPATIENT)
Dept: ENDOCRINOLOGY | Facility: CLINIC | Age: 58
End: 2023-07-06

## 2023-07-13 ENCOUNTER — APPOINTMENT (OUTPATIENT)
Dept: CARDIOLOGY | Facility: CLINIC | Age: 58
End: 2023-07-13

## 2023-07-14 ENCOUNTER — APPOINTMENT (OUTPATIENT)
Dept: ENDOCRINOLOGY | Facility: CLINIC | Age: 58
End: 2023-07-14

## 2023-07-17 ENCOUNTER — APPOINTMENT (OUTPATIENT)
Dept: CARDIOLOGY | Facility: CLINIC | Age: 58
End: 2023-07-17

## 2023-07-19 ENCOUNTER — APPOINTMENT (OUTPATIENT)
Dept: CARDIOLOGY | Facility: CLINIC | Age: 58
End: 2023-07-19

## 2023-07-20 ENCOUNTER — APPOINTMENT (OUTPATIENT)
Dept: CARDIOLOGY | Facility: CLINIC | Age: 58
End: 2023-07-20

## 2023-07-20 ENCOUNTER — RX RENEWAL (OUTPATIENT)
Age: 58
End: 2023-07-20

## 2023-07-24 ENCOUNTER — APPOINTMENT (OUTPATIENT)
Dept: CARDIOLOGY | Facility: CLINIC | Age: 58
End: 2023-07-24

## 2023-07-25 ENCOUNTER — APPOINTMENT (OUTPATIENT)
Dept: CARDIOLOGY | Facility: CLINIC | Age: 58
End: 2023-07-25
Payer: COMMERCIAL

## 2023-07-25 VITALS
HEART RATE: 96 BPM | WEIGHT: 221 LBS | OXYGEN SATURATION: 99 % | BODY MASS INDEX: 30.94 KG/M2 | SYSTOLIC BLOOD PRESSURE: 114 MMHG | DIASTOLIC BLOOD PRESSURE: 76 MMHG | HEIGHT: 71 IN

## 2023-07-25 PROCEDURE — 99215 OFFICE O/P EST HI 40 MIN: CPT

## 2023-07-26 ENCOUNTER — APPOINTMENT (OUTPATIENT)
Dept: CARDIOLOGY | Facility: CLINIC | Age: 58
End: 2023-07-26

## 2023-07-27 ENCOUNTER — APPOINTMENT (OUTPATIENT)
Dept: PEDIATRIC ALLERGY IMMUNOLOGY | Facility: CLINIC | Age: 58
End: 2023-07-27

## 2023-07-31 ENCOUNTER — APPOINTMENT (OUTPATIENT)
Dept: CARDIOLOGY | Facility: CLINIC | Age: 58
End: 2023-07-31

## 2023-08-02 ENCOUNTER — APPOINTMENT (OUTPATIENT)
Dept: CARDIOLOGY | Facility: CLINIC | Age: 58
End: 2023-08-02

## 2023-08-03 ENCOUNTER — APPOINTMENT (OUTPATIENT)
Dept: CARDIOLOGY | Facility: CLINIC | Age: 58
End: 2023-08-03

## 2023-08-04 ENCOUNTER — RX RENEWAL (OUTPATIENT)
Age: 58
End: 2023-08-04

## 2023-08-07 ENCOUNTER — RX RENEWAL (OUTPATIENT)
Age: 58
End: 2023-08-07

## 2023-08-07 ENCOUNTER — APPOINTMENT (OUTPATIENT)
Dept: CARDIOLOGY | Facility: CLINIC | Age: 58
End: 2023-08-07

## 2023-08-07 RX ORDER — INSULIN LISPRO 100 [IU]/ML
100 INJECTION, SOLUTION INTRAVENOUS; SUBCUTANEOUS
Qty: 1 | Refills: 3 | Status: ACTIVE | COMMUNITY
Start: 2022-09-08 | End: 1900-01-01

## 2023-08-09 ENCOUNTER — APPOINTMENT (OUTPATIENT)
Dept: CARDIOLOGY | Facility: CLINIC | Age: 58
End: 2023-08-09

## 2023-08-10 ENCOUNTER — APPOINTMENT (OUTPATIENT)
Dept: CARDIOLOGY | Facility: CLINIC | Age: 58
End: 2023-08-10

## 2023-08-14 ENCOUNTER — APPOINTMENT (OUTPATIENT)
Dept: CARDIOLOGY | Facility: CLINIC | Age: 58
End: 2023-08-14

## 2023-08-16 ENCOUNTER — APPOINTMENT (OUTPATIENT)
Dept: CARDIOLOGY | Facility: CLINIC | Age: 58
End: 2023-08-16

## 2023-08-17 ENCOUNTER — APPOINTMENT (OUTPATIENT)
Dept: CARDIOLOGY | Facility: CLINIC | Age: 58
End: 2023-08-17

## 2023-08-21 ENCOUNTER — APPOINTMENT (OUTPATIENT)
Dept: CARDIOLOGY | Facility: CLINIC | Age: 58
End: 2023-08-21

## 2023-08-23 ENCOUNTER — APPOINTMENT (OUTPATIENT)
Dept: CARDIOLOGY | Facility: CLINIC | Age: 58
End: 2023-08-23

## 2023-08-24 ENCOUNTER — APPOINTMENT (OUTPATIENT)
Dept: CARDIOLOGY | Facility: CLINIC | Age: 58
End: 2023-08-24

## 2023-08-28 ENCOUNTER — APPOINTMENT (OUTPATIENT)
Dept: CARDIOLOGY | Facility: CLINIC | Age: 58
End: 2023-08-28

## 2023-08-30 ENCOUNTER — APPOINTMENT (OUTPATIENT)
Dept: CARDIOLOGY | Facility: CLINIC | Age: 58
End: 2023-08-30

## 2023-08-31 ENCOUNTER — APPOINTMENT (OUTPATIENT)
Dept: CARDIOLOGY | Facility: CLINIC | Age: 58
End: 2023-08-31

## 2023-09-06 ENCOUNTER — APPOINTMENT (OUTPATIENT)
Dept: CARDIOLOGY | Facility: CLINIC | Age: 58
End: 2023-09-06

## 2023-09-06 ENCOUNTER — RX RENEWAL (OUTPATIENT)
Age: 58
End: 2023-09-06

## 2023-09-07 ENCOUNTER — APPOINTMENT (OUTPATIENT)
Dept: CARDIOLOGY | Facility: CLINIC | Age: 58
End: 2023-09-07

## 2023-09-11 ENCOUNTER — APPOINTMENT (OUTPATIENT)
Dept: CARDIOLOGY | Facility: CLINIC | Age: 58
End: 2023-09-11

## 2023-09-13 ENCOUNTER — APPOINTMENT (OUTPATIENT)
Dept: CARDIOLOGY | Facility: CLINIC | Age: 58
End: 2023-09-13

## 2023-09-14 ENCOUNTER — APPOINTMENT (OUTPATIENT)
Dept: CARDIOLOGY | Facility: CLINIC | Age: 58
End: 2023-09-14
Payer: COMMERCIAL

## 2023-09-14 PROCEDURE — 99215 OFFICE O/P EST HI 40 MIN: CPT | Mod: 95

## 2023-09-18 ENCOUNTER — APPOINTMENT (OUTPATIENT)
Dept: CARDIOLOGY | Facility: CLINIC | Age: 58
End: 2023-09-18

## 2023-09-20 ENCOUNTER — APPOINTMENT (OUTPATIENT)
Dept: CARDIOLOGY | Facility: CLINIC | Age: 58
End: 2023-09-20

## 2023-09-21 ENCOUNTER — APPOINTMENT (OUTPATIENT)
Dept: CARDIOLOGY | Facility: CLINIC | Age: 58
End: 2023-09-21

## 2023-09-25 ENCOUNTER — APPOINTMENT (OUTPATIENT)
Dept: CARDIOLOGY | Facility: CLINIC | Age: 58
End: 2023-09-25

## 2023-09-27 ENCOUNTER — APPOINTMENT (OUTPATIENT)
Dept: CARDIOLOGY | Facility: CLINIC | Age: 58
End: 2023-09-27

## 2023-09-28 ENCOUNTER — APPOINTMENT (OUTPATIENT)
Dept: CARDIOLOGY | Facility: CLINIC | Age: 58
End: 2023-09-28

## 2023-10-02 ENCOUNTER — APPOINTMENT (OUTPATIENT)
Dept: ENDOCRINOLOGY | Facility: CLINIC | Age: 58
End: 2023-10-02

## 2023-10-02 ENCOUNTER — APPOINTMENT (OUTPATIENT)
Dept: CARDIOLOGY | Facility: CLINIC | Age: 58
End: 2023-10-02

## 2023-10-04 ENCOUNTER — NON-APPOINTMENT (OUTPATIENT)
Age: 58
End: 2023-10-04

## 2023-10-04 ENCOUNTER — APPOINTMENT (OUTPATIENT)
Dept: CARDIOLOGY | Facility: CLINIC | Age: 58
End: 2023-10-04
Payer: COMMERCIAL

## 2023-10-04 ENCOUNTER — APPOINTMENT (OUTPATIENT)
Dept: CARDIOLOGY | Facility: CLINIC | Age: 58
End: 2023-10-04

## 2023-10-04 VITALS
OXYGEN SATURATION: 97 % | HEIGHT: 71 IN | DIASTOLIC BLOOD PRESSURE: 70 MMHG | HEART RATE: 93 BPM | BODY MASS INDEX: 29.4 KG/M2 | SYSTOLIC BLOOD PRESSURE: 100 MMHG | WEIGHT: 210 LBS

## 2023-10-04 DIAGNOSIS — R07.9 CHEST PAIN, UNSPECIFIED: ICD-10-CM

## 2023-10-04 PROCEDURE — 93000 ELECTROCARDIOGRAM COMPLETE: CPT

## 2023-10-04 PROCEDURE — 99215 OFFICE O/P EST HI 40 MIN: CPT

## 2023-10-04 RX ORDER — LOSARTAN POTASSIUM 25 MG/1
25 TABLET, FILM COATED ORAL DAILY
Qty: 90 | Refills: 3 | Status: DISCONTINUED | COMMUNITY
Start: 2021-12-28 | End: 2023-10-04

## 2023-10-04 RX ORDER — ALIROCUMAB 150 MG/ML
150 INJECTION, SOLUTION SUBCUTANEOUS
Qty: 6 | Refills: 3 | Status: DISCONTINUED | COMMUNITY
Start: 2023-07-25 | End: 2023-10-04

## 2023-10-04 RX ORDER — FUROSEMIDE 20 MG/1
20 TABLET ORAL
Qty: 15 | Refills: 2 | Status: DISCONTINUED | COMMUNITY
Start: 2023-06-19 | End: 2023-10-04

## 2023-10-04 RX ORDER — ALIROCUMAB 75 MG/ML
75 INJECTION, SOLUTION SUBCUTANEOUS
Qty: 1 | Refills: 6 | Status: DISCONTINUED | COMMUNITY
Start: 2023-01-23 | End: 2023-10-04

## 2023-10-05 ENCOUNTER — APPOINTMENT (OUTPATIENT)
Dept: CARDIOLOGY | Facility: CLINIC | Age: 58
End: 2023-10-05

## 2023-10-09 ENCOUNTER — APPOINTMENT (OUTPATIENT)
Dept: CARDIOLOGY | Facility: CLINIC | Age: 58
End: 2023-10-09

## 2023-10-11 ENCOUNTER — APPOINTMENT (OUTPATIENT)
Dept: CARDIOLOGY | Facility: CLINIC | Age: 58
End: 2023-10-11

## 2023-10-12 ENCOUNTER — OUTPATIENT (OUTPATIENT)
Dept: OUTPATIENT SERVICES | Facility: HOSPITAL | Age: 58
LOS: 1 days | End: 2023-10-12
Payer: COMMERCIAL

## 2023-10-12 ENCOUNTER — APPOINTMENT (OUTPATIENT)
Dept: CARDIOLOGY | Facility: CLINIC | Age: 58
End: 2023-10-12

## 2023-10-12 ENCOUNTER — TRANSCRIPTION ENCOUNTER (OUTPATIENT)
Age: 58
End: 2023-10-12

## 2023-10-12 VITALS
HEIGHT: 71 IN | WEIGHT: 212.08 LBS | TEMPERATURE: 97 F | HEART RATE: 79 BPM | OXYGEN SATURATION: 99 % | SYSTOLIC BLOOD PRESSURE: 139 MMHG | RESPIRATION RATE: 16 BRPM | DIASTOLIC BLOOD PRESSURE: 86 MMHG

## 2023-10-12 VITALS
OXYGEN SATURATION: 96 % | DIASTOLIC BLOOD PRESSURE: 78 MMHG | TEMPERATURE: 98 F | RESPIRATION RATE: 18 BRPM | SYSTOLIC BLOOD PRESSURE: 134 MMHG | HEART RATE: 87 BPM

## 2023-10-12 DIAGNOSIS — Z95.5 PRESENCE OF CORONARY ANGIOPLASTY IMPLANT AND GRAFT: Chronic | ICD-10-CM

## 2023-10-12 DIAGNOSIS — R94.39 ABNORMAL RESULT OF OTHER CARDIOVASCULAR FUNCTION STUDY: ICD-10-CM

## 2023-10-12 LAB
ANION GAP SERPL CALC-SCNC: 11 MMOL/L — SIGNIFICANT CHANGE UP (ref 5–17)
ANION GAP SERPL CALC-SCNC: 11 MMOL/L — SIGNIFICANT CHANGE UP (ref 5–17)
BUN SERPL-MCNC: 12 MG/DL — SIGNIFICANT CHANGE UP (ref 7–23)
BUN SERPL-MCNC: 12 MG/DL — SIGNIFICANT CHANGE UP (ref 7–23)
CALCIUM SERPL-MCNC: 10.1 MG/DL — SIGNIFICANT CHANGE UP (ref 8.4–10.5)
CALCIUM SERPL-MCNC: 10.1 MG/DL — SIGNIFICANT CHANGE UP (ref 8.4–10.5)
CHLORIDE SERPL-SCNC: 105 MMOL/L — SIGNIFICANT CHANGE UP (ref 96–108)
CHLORIDE SERPL-SCNC: 105 MMOL/L — SIGNIFICANT CHANGE UP (ref 96–108)
CO2 SERPL-SCNC: 26 MMOL/L — SIGNIFICANT CHANGE UP (ref 22–31)
CO2 SERPL-SCNC: 26 MMOL/L — SIGNIFICANT CHANGE UP (ref 22–31)
CREAT SERPL-MCNC: 1.06 MG/DL — SIGNIFICANT CHANGE UP (ref 0.5–1.3)
CREAT SERPL-MCNC: 1.06 MG/DL — SIGNIFICANT CHANGE UP (ref 0.5–1.3)
EGFR: 82 ML/MIN/1.73M2 — SIGNIFICANT CHANGE UP
EGFR: 82 ML/MIN/1.73M2 — SIGNIFICANT CHANGE UP
GLUCOSE BLDC GLUCOMTR-MCNC: 157 MG/DL — HIGH (ref 70–99)
GLUCOSE BLDC GLUCOMTR-MCNC: 157 MG/DL — HIGH (ref 70–99)
GLUCOSE BLDC GLUCOMTR-MCNC: 190 MG/DL — HIGH (ref 70–99)
GLUCOSE SERPL-MCNC: 161 MG/DL — HIGH (ref 70–99)
GLUCOSE SERPL-MCNC: 161 MG/DL — HIGH (ref 70–99)
HCT VFR BLD CALC: 47.5 % — SIGNIFICANT CHANGE UP (ref 39–50)
HCT VFR BLD CALC: 47.5 % — SIGNIFICANT CHANGE UP (ref 39–50)
HGB BLD-MCNC: 14.9 G/DL — SIGNIFICANT CHANGE UP (ref 13–17)
HGB BLD-MCNC: 14.9 G/DL — SIGNIFICANT CHANGE UP (ref 13–17)
MCHC RBC-ENTMCNC: 25.5 PG — LOW (ref 27–34)
MCHC RBC-ENTMCNC: 25.5 PG — LOW (ref 27–34)
MCHC RBC-ENTMCNC: 31.4 GM/DL — LOW (ref 32–36)
MCHC RBC-ENTMCNC: 31.4 GM/DL — LOW (ref 32–36)
MCV RBC AUTO: 81.3 FL — SIGNIFICANT CHANGE UP (ref 80–100)
MCV RBC AUTO: 81.3 FL — SIGNIFICANT CHANGE UP (ref 80–100)
NRBC # BLD: 0 /100 WBCS — SIGNIFICANT CHANGE UP (ref 0–0)
NRBC # BLD: 0 /100 WBCS — SIGNIFICANT CHANGE UP (ref 0–0)
PLATELET # BLD AUTO: 249 K/UL — SIGNIFICANT CHANGE UP (ref 150–400)
PLATELET # BLD AUTO: 249 K/UL — SIGNIFICANT CHANGE UP (ref 150–400)
POTASSIUM SERPL-MCNC: 5.3 MMOL/L — SIGNIFICANT CHANGE UP (ref 3.5–5.3)
POTASSIUM SERPL-MCNC: 5.3 MMOL/L — SIGNIFICANT CHANGE UP (ref 3.5–5.3)
POTASSIUM SERPL-SCNC: 5.3 MMOL/L — SIGNIFICANT CHANGE UP (ref 3.5–5.3)
POTASSIUM SERPL-SCNC: 5.3 MMOL/L — SIGNIFICANT CHANGE UP (ref 3.5–5.3)
RBC # BLD: 5.84 M/UL — HIGH (ref 4.2–5.8)
RBC # BLD: 5.84 M/UL — HIGH (ref 4.2–5.8)
RBC # FLD: 14.9 % — HIGH (ref 10.3–14.5)
RBC # FLD: 14.9 % — HIGH (ref 10.3–14.5)
SODIUM SERPL-SCNC: 142 MMOL/L — SIGNIFICANT CHANGE UP (ref 135–145)
SODIUM SERPL-SCNC: 142 MMOL/L — SIGNIFICANT CHANGE UP (ref 135–145)
WBC # BLD: 11.45 K/UL — HIGH (ref 3.8–10.5)
WBC # BLD: 11.45 K/UL — HIGH (ref 3.8–10.5)
WBC # FLD AUTO: 11.45 K/UL — HIGH (ref 3.8–10.5)
WBC # FLD AUTO: 11.45 K/UL — HIGH (ref 3.8–10.5)

## 2023-10-12 PROCEDURE — C9600: CPT | Mod: LC

## 2023-10-12 PROCEDURE — 99152 MOD SED SAME PHYS/QHP 5/>YRS: CPT

## 2023-10-12 PROCEDURE — C1894: CPT

## 2023-10-12 PROCEDURE — 93010 ELECTROCARDIOGRAM REPORT: CPT | Mod: 76,59

## 2023-10-12 PROCEDURE — 80048 BASIC METABOLIC PNL TOTAL CA: CPT

## 2023-10-12 PROCEDURE — 82962 GLUCOSE BLOOD TEST: CPT

## 2023-10-12 PROCEDURE — 93454 CORONARY ARTERY ANGIO S&I: CPT | Mod: 59

## 2023-10-12 PROCEDURE — C1725: CPT

## 2023-10-12 PROCEDURE — C1887: CPT

## 2023-10-12 PROCEDURE — 85027 COMPLETE CBC AUTOMATED: CPT

## 2023-10-12 PROCEDURE — 93005 ELECTROCARDIOGRAM TRACING: CPT

## 2023-10-12 PROCEDURE — C1769: CPT

## 2023-10-12 PROCEDURE — 93454 CORONARY ARTERY ANGIO S&I: CPT | Mod: 26,59

## 2023-10-12 PROCEDURE — C1874: CPT

## 2023-10-12 PROCEDURE — 92928 PRQ TCAT PLMT NTRAC ST 1 LES: CPT | Mod: LC

## 2023-10-12 RX ORDER — INSULIN LISPRO 100/ML
VIAL (ML) SUBCUTANEOUS
Refills: 0 | Status: DISCONTINUED | OUTPATIENT
Start: 2023-10-12 | End: 2023-10-26

## 2023-10-12 RX ORDER — SODIUM CHLORIDE 9 MG/ML
1000 INJECTION, SOLUTION INTRAVENOUS
Refills: 0 | Status: DISCONTINUED | OUTPATIENT
Start: 2023-10-12 | End: 2023-10-26

## 2023-10-12 RX ORDER — INSULIN LISPRO 100/ML
VIAL (ML) SUBCUTANEOUS AT BEDTIME
Refills: 0 | Status: DISCONTINUED | OUTPATIENT
Start: 2023-10-12 | End: 2023-10-26

## 2023-10-12 RX ORDER — SODIUM CHLORIDE 9 MG/ML
250 INJECTION INTRAMUSCULAR; INTRAVENOUS; SUBCUTANEOUS ONCE
Refills: 0 | Status: COMPLETED | OUTPATIENT
Start: 2023-10-12 | End: 2023-10-12

## 2023-10-12 RX ORDER — INSULIN LISPRO 100/ML
30 VIAL (ML) SUBCUTANEOUS
Refills: 0 | DISCHARGE

## 2023-10-12 RX ORDER — CLOPIDOGREL BISULFATE 75 MG/1
75 TABLET, FILM COATED ORAL DAILY
Refills: 0 | Status: DISCONTINUED | OUTPATIENT
Start: 2023-10-12 | End: 2023-10-12

## 2023-10-12 RX ORDER — INSULIN LISPRO 100/ML
30 VIAL (ML) SUBCUTANEOUS
Qty: 0 | Refills: 0 | DISCHARGE
Start: 2023-10-12

## 2023-10-12 RX ORDER — DEXTROSE 50 % IN WATER 50 %
25 SYRINGE (ML) INTRAVENOUS ONCE
Refills: 0 | Status: DISCONTINUED | OUTPATIENT
Start: 2023-10-12 | End: 2023-10-26

## 2023-10-12 RX ORDER — TIRZEPATIDE 15 MG/.5ML
0 INJECTION, SOLUTION SUBCUTANEOUS
Qty: 0 | Refills: 0 | DISCHARGE
Start: 2023-10-12

## 2023-10-12 RX ORDER — TICAGRELOR 90 MG/1
180 TABLET ORAL ONCE
Refills: 0 | Status: COMPLETED | OUTPATIENT
Start: 2023-10-12 | End: 2023-10-12

## 2023-10-12 RX ORDER — EVOLOCUMAB 140 MG/ML
140 INJECTION, SOLUTION SUBCUTANEOUS
Refills: 0 | DISCHARGE

## 2023-10-12 RX ORDER — TICAGRELOR 90 MG/1
90 TABLET ORAL EVERY 12 HOURS
Refills: 0 | Status: DISCONTINUED | OUTPATIENT
Start: 2023-10-13 | End: 2023-10-26

## 2023-10-12 RX ORDER — SODIUM CHLORIDE 9 MG/ML
1000 INJECTION INTRAMUSCULAR; INTRAVENOUS; SUBCUTANEOUS
Refills: 0 | Status: DISCONTINUED | OUTPATIENT
Start: 2023-10-12 | End: 2023-10-12

## 2023-10-12 RX ORDER — ASPIRIN/CALCIUM CARB/MAGNESIUM 324 MG
81 TABLET ORAL DAILY
Refills: 0 | Status: DISCONTINUED | OUTPATIENT
Start: 2023-10-12 | End: 2023-10-26

## 2023-10-12 RX ORDER — CLOPIDOGREL BISULFATE 75 MG/1
1 TABLET, FILM COATED ORAL
Qty: 90 | Refills: 3
Start: 2023-10-12 | End: 2024-10-05

## 2023-10-12 RX ORDER — GLUCAGON INJECTION, SOLUTION 0.5 MG/.1ML
1 INJECTION, SOLUTION SUBCUTANEOUS ONCE
Refills: 0 | Status: DISCONTINUED | OUTPATIENT
Start: 2023-10-12 | End: 2023-10-26

## 2023-10-12 RX ORDER — DEXTROSE 50 % IN WATER 50 %
12.5 SYRINGE (ML) INTRAVENOUS ONCE
Refills: 0 | Status: DISCONTINUED | OUTPATIENT
Start: 2023-10-12 | End: 2023-10-26

## 2023-10-12 RX ORDER — TICAGRELOR 90 MG/1
1 TABLET ORAL
Qty: 180 | Refills: 3
Start: 2023-10-12 | End: 2024-10-05

## 2023-10-12 RX ORDER — TIRZEPATIDE 15 MG/.5ML
7.5 INJECTION, SOLUTION SUBCUTANEOUS
Refills: 0 | DISCHARGE

## 2023-10-12 RX ORDER — DEXTROSE 50 % IN WATER 50 %
15 SYRINGE (ML) INTRAVENOUS ONCE
Refills: 0 | Status: DISCONTINUED | OUTPATIENT
Start: 2023-10-12 | End: 2023-10-26

## 2023-10-12 RX ADMIN — SODIUM CHLORIDE 750 MILLILITER(S): 9 INJECTION INTRAMUSCULAR; INTRAVENOUS; SUBCUTANEOUS at 13:32

## 2023-10-12 RX ADMIN — TICAGRELOR 180 MILLIGRAM(S): 90 TABLET ORAL at 18:13

## 2023-10-12 NOTE — CHART NOTE - NSCHARTNOTEFT_GEN_A_CORE
10/12 [facility] ANA - OMx1 VIA RRA, pt states he had a reaction to plavix like chills and palpitation, pt was loaded with plavix in the lab but didn't notice any reaction will load with brilinta 180mg & then BID as per Dr Dean.

## 2023-10-12 NOTE — ASU DISCHARGE PLAN (ADULT/PEDIATRIC) - SIGNS AND SYMPTOMS OF INFECTION: FEVER, REDNESS, SWELLING, FOUL SMELLING DISCHARGE
Statement Selected Implemented All Universal Safety Interventions:  [place] to call system. Call bell, personal items and telephone within reach. Instruct patient to call for assistance. Room bathroom lighting operational. Non-slip footwear when patient is off stretcher. Physically safe environment: no spills, clutter or unnecessary equipment. Stretcher in lowest position, wheels locked, appropriate side rails in place.

## 2023-10-12 NOTE — ASU DISCHARGE PLAN (ADULT/PEDIATRIC) - CARE PROVIDER_API CALL
Jad Kay  Cardiovascular Disease  [address]  [place] [address]  Phone: [phone]  Fax: [phone]  Follow Up Time: 2 weeks

## 2023-10-12 NOTE — ASU DISCHARGE PLAN (ADULT/PEDIATRIC) - ASU DC SPECIAL INSTRUCTIONSFT
Wound Care:   the day AFTER your procedure remove bandage GENT, and clean using  mild soap and gentle warm, water stream, pat dry. leave OPEN to air. YOU MAY SHOWER   DO NOT apply lotions, creams, ointments, powder, perfumes to your incision site  DO NOT SOAK your site for 1 week ( no baths, no pools, no tubs, etc...)  Check  your groin and /or wrist daily. A small amount of bruising, and soreness are normal    ACTIVITY: for 24 hours   - DO NOT DRIVE  - DO NOT make any important decisions or sign legal documents   - DO NOT operate heavy machineries   - you may resume sexual activity in 48 hours, unless otherwise instructed by your cardiologist     If your procedure was done through the WRIST: for the NEXT 3DAYS:  - avoid pushing, pulling, with that affected wrist   - avoid repeated movement of that hand and wrist ( eg: typing, hammering)  - DO NOT LIFT anything more than 5 lbs     If your procedure was done through the GROIN: for the NEXT 5 DAYS  - Limit climbing stairs, DO NOT soak in bathtub or pool  - no strenuous activities, pushing, pulling, straining  - Do not lift anything 10lbs or heavier     MEDICATION:   take your medications as explained ( see discharge paperwork)   If you received a STENT, you will be taking antiplatelet medications to KEEP YOUR STENT OPEN ( eg: Aspirin, Plavix, Brilinta, Effient, etc).  Take as prescribed DO NOT STOP taking them without consulting with your cardiologist first.     Follow heart healthy diet recommended by your doctor, , if you smoke STOP SMOKING ( may call [phone] for center of tobacco control if you need assistance)     CALL your doctor to make appointment in 2 WEEKS     ***CALL YOUR DOCTOR***  if you experience: fever, chills, body aches, or severe pain, swelling, redness, heat or yellow discharge at incision site  If you experience Bleeding or excruciating pain at the procedural site, swelling ( golf ball size) at your procedural site  If you experience CHEST PAIN  If you experience extremity numbness, tingling, temperature change ( of your procedural site)   If you are unable to reach your doctor, you may contact:   -Cardiology Office at [facility] at [phone] or   - [facility] [phone]  - [facility] [phone]

## 2023-10-12 NOTE — H&P CARDIOLOGY - HISTORY OF PRESENT ILLNESS
58 year old male with PMHx of HTN, HLD, T2DM on insulin Chronic systolic HFrEF (30%) and known CAD s/p MI in 2021 with subsequent PCI with ANA to pLAD @ that time and PCI ANA to pRCA 70% in 2022 with known D1 70% stenosis who presents today for [facility].  Patient presented to his Cardiologist - Dr. Botello on 10/4 endorsing lightheadedness and occasional chest discomfort.   58 year old male with PMHx of HTN, HLD, T2DM on insulin Chronic systolic HFrEF (30%) and known CAD s/p MI in 2021 with subsequent PCI with ANA to pLAD @ that time and PCI ANA to pRCA 70% in 2022 with known D1 70% stenosis who presents today for [facility].  Patient presented to his Cardiologist - Dr. Botello on 10/4 endorsing episodes of class II anginal exertional chest discomfort x several months.  2/2 patient hx and known d1 stenosis decision was made to proceed with angiogram.

## 2023-10-16 ENCOUNTER — APPOINTMENT (OUTPATIENT)
Dept: CARDIOLOGY | Facility: CLINIC | Age: 58
End: 2023-10-16

## 2023-10-19 ENCOUNTER — NON-APPOINTMENT (OUTPATIENT)
Age: 58
End: 2023-10-19

## 2023-10-19 ENCOUNTER — APPOINTMENT (OUTPATIENT)
Dept: CARDIOLOGY | Facility: CLINIC | Age: 58
End: 2023-10-19
Payer: COMMERCIAL

## 2023-10-19 VITALS
HEIGHT: 71 IN | WEIGHT: 209 LBS | DIASTOLIC BLOOD PRESSURE: 80 MMHG | SYSTOLIC BLOOD PRESSURE: 122 MMHG | HEART RATE: 93 BPM | BODY MASS INDEX: 29.26 KG/M2 | OXYGEN SATURATION: 100 %

## 2023-10-19 PROCEDURE — 93000 ELECTROCARDIOGRAM COMPLETE: CPT

## 2023-10-19 PROCEDURE — 99215 OFFICE O/P EST HI 40 MIN: CPT

## 2023-10-19 RX ORDER — EVOLOCUMAB 140 MG/ML
140 INJECTION, SOLUTION SUBCUTANEOUS
Qty: 2 | Refills: 4 | Status: DISCONTINUED | COMMUNITY
Start: 2023-01-26 | End: 2023-10-19

## 2023-10-19 RX ORDER — ALIROCUMAB 150 MG/ML
150 INJECTION, SOLUTION SUBCUTANEOUS
Qty: 6 | Refills: 3 | Status: ACTIVE | COMMUNITY
Start: 2023-10-19 | End: 1900-01-01

## 2023-10-19 RX ORDER — SACUBITRIL AND VALSARTAN 24; 26 MG/1; MG/1
24-26 TABLET, FILM COATED ORAL
Qty: 30 | Refills: 4 | Status: DISCONTINUED | COMMUNITY
Start: 2023-06-15 | End: 2023-10-19

## 2023-10-19 RX ORDER — CARVEDILOL 6.25 MG/1
6.25 TABLET, FILM COATED ORAL TWICE DAILY
Qty: 60 | Refills: 5 | Status: DISCONTINUED | COMMUNITY
Start: 2023-05-16 | End: 2023-10-19

## 2023-10-26 ENCOUNTER — RX RENEWAL (OUTPATIENT)
Age: 58
End: 2023-10-26

## 2023-10-26 RX ORDER — LANCETS 28 GAUGE
EACH MISCELLANEOUS
Qty: 4 | Refills: 3 | Status: ACTIVE | COMMUNITY
Start: 2023-10-26 | End: 1900-01-01

## 2023-11-03 RX ORDER — BLOOD SUGAR DIAGNOSTIC
STRIP MISCELLANEOUS
Qty: 200 | Refills: 3 | Status: ACTIVE | COMMUNITY
Start: 2023-11-03 | End: 1900-01-01

## 2023-11-07 NOTE — H&P CARDIOLOGY - NSWEIGHTCALCTOOLDRUG2_GEN_A_CORE
used Skin Substitute Text: The defect edges were debeveled with a #15 scalpel blade. Given the location of the defect, shape of the defect and the proximity to free margins a skin substitute graft was deemed most appropriate.  The graft material was trimmed to fit the size of the defect. The graft was then placed in the primary defect and oriented appropriately.

## 2023-11-08 ENCOUNTER — NON-APPOINTMENT (OUTPATIENT)
Age: 58
End: 2023-11-08

## 2023-11-08 ENCOUNTER — APPOINTMENT (OUTPATIENT)
Dept: CARDIOLOGY | Facility: CLINIC | Age: 58
End: 2023-11-08
Payer: COMMERCIAL

## 2023-11-08 VITALS
SYSTOLIC BLOOD PRESSURE: 106 MMHG | OXYGEN SATURATION: 100 % | WEIGHT: 212 LBS | DIASTOLIC BLOOD PRESSURE: 66 MMHG | HEIGHT: 71 IN | HEART RATE: 95 BPM | BODY MASS INDEX: 29.68 KG/M2

## 2023-11-08 PROCEDURE — 93000 ELECTROCARDIOGRAM COMPLETE: CPT

## 2023-11-08 PROCEDURE — 99215 OFFICE O/P EST HI 40 MIN: CPT

## 2023-11-13 ENCOUNTER — APPOINTMENT (OUTPATIENT)
Dept: HEART FAILURE | Facility: CLINIC | Age: 58
End: 2023-11-13
Payer: COMMERCIAL

## 2023-11-13 VITALS
WEIGHT: 212 LBS | OXYGEN SATURATION: 97 % | BODY MASS INDEX: 29.68 KG/M2 | HEIGHT: 71 IN | HEART RATE: 92 BPM | SYSTOLIC BLOOD PRESSURE: 145 MMHG | DIASTOLIC BLOOD PRESSURE: 87 MMHG | TEMPERATURE: 98.8 F

## 2023-11-13 PROCEDURE — 99204 OFFICE O/P NEW MOD 45 MIN: CPT

## 2023-11-13 PROCEDURE — 93000 ELECTROCARDIOGRAM COMPLETE: CPT

## 2023-11-13 RX ORDER — SPIRONOLACTONE 25 MG/1
25 TABLET ORAL
Qty: 45 | Refills: 3 | Status: DISCONTINUED | COMMUNITY
Start: 2023-10-19 | End: 2023-11-13

## 2023-11-13 RX ORDER — NITROGLYCERIN 0.4 MG/1
0.4 TABLET SUBLINGUAL
Qty: 7 | Refills: 0 | Status: DISCONTINUED | COMMUNITY
Start: 2023-03-09 | End: 2023-11-13

## 2023-11-13 RX ORDER — NITROGLYCERIN 0.4 MG/1
0.4 TABLET SUBLINGUAL
Qty: 4 | Refills: 0 | Status: DISCONTINUED | COMMUNITY
Start: 2022-12-12 | End: 2023-11-13

## 2023-11-13 RX ORDER — TICAGRELOR 90 MG/1
90 TABLET ORAL
Qty: 60 | Refills: 0 | Status: ACTIVE | COMMUNITY
Start: 2023-11-13

## 2023-11-15 ENCOUNTER — RX RENEWAL (OUTPATIENT)
Age: 58
End: 2023-11-15

## 2023-11-15 ENCOUNTER — APPOINTMENT (OUTPATIENT)
Dept: CARDIOLOGY | Facility: CLINIC | Age: 58
End: 2023-11-15
Payer: COMMERCIAL

## 2023-11-15 DIAGNOSIS — T78.40XA ALLERGY, UNSPECIFIED, INITIAL ENCOUNTER: ICD-10-CM

## 2023-11-15 PROCEDURE — 99215 OFFICE O/P EST HI 40 MIN: CPT | Mod: 95

## 2023-11-15 RX ORDER — FLASH GLUCOSE SENSOR
KIT MISCELLANEOUS
Qty: 7 | Refills: 3 | Status: ACTIVE | COMMUNITY
Start: 2022-09-02 | End: 1900-01-01

## 2023-11-15 RX ORDER — EPINEPHRINE 1 MG/ML
1 INJECTION, SOLUTION INTRAMUSCULAR; SUBCUTANEOUS
Qty: 1 | Refills: 0 | Status: ACTIVE | COMMUNITY
Start: 2023-11-15 | End: 1900-01-01

## 2023-12-05 ENCOUNTER — TRANSCRIPTION ENCOUNTER (OUTPATIENT)
Age: 58
End: 2023-12-05

## 2023-12-06 ENCOUNTER — OUTPATIENT (OUTPATIENT)
Dept: OUTPATIENT SERVICES | Facility: HOSPITAL | Age: 58
LOS: 1 days | End: 2023-12-06
Payer: COMMERCIAL

## 2023-12-06 ENCOUNTER — APPOINTMENT (OUTPATIENT)
Dept: CV DIAGNOSITCS | Facility: HOSPITAL | Age: 58
End: 2023-12-06

## 2023-12-06 DIAGNOSIS — Z95.5 PRESENCE OF CORONARY ANGIOPLASTY IMPLANT AND GRAFT: Chronic | ICD-10-CM

## 2023-12-06 DIAGNOSIS — I50.20 UNSPECIFIED SYSTOLIC (CONGESTIVE) HEART FAILURE: ICD-10-CM

## 2023-12-06 PROCEDURE — 93356 MYOCRD STRAIN IMG SPCKL TRCK: CPT

## 2023-12-06 PROCEDURE — 93306 TTE W/DOPPLER COMPLETE: CPT | Mod: 26

## 2023-12-06 PROCEDURE — C8929: CPT

## 2023-12-08 ENCOUNTER — APPOINTMENT (OUTPATIENT)
Dept: ENDOCRINOLOGY | Facility: CLINIC | Age: 58
End: 2023-12-08
Payer: COMMERCIAL

## 2023-12-08 VITALS
SYSTOLIC BLOOD PRESSURE: 126 MMHG | OXYGEN SATURATION: 96 % | WEIGHT: 211 LBS | DIASTOLIC BLOOD PRESSURE: 68 MMHG | BODY MASS INDEX: 29.54 KG/M2 | HEIGHT: 71 IN | HEART RATE: 126 BPM

## 2023-12-08 LAB — GLUCOSE BLDC GLUCOMTR-MCNC: 231

## 2023-12-08 PROCEDURE — 99214 OFFICE O/P EST MOD 30 MIN: CPT | Mod: 25

## 2023-12-08 PROCEDURE — 83036 HEMOGLOBIN GLYCOSYLATED A1C: CPT | Mod: QW

## 2023-12-08 PROCEDURE — 95251 CONT GLUC MNTR ANALYSIS I&R: CPT

## 2023-12-08 PROCEDURE — 82962 GLUCOSE BLOOD TEST: CPT

## 2023-12-08 RX ORDER — INSULIN GLARGINE 100 [IU]/ML
100 INJECTION, SOLUTION SUBCUTANEOUS
Qty: 1 | Refills: 3 | Status: ACTIVE | COMMUNITY
Start: 2023-12-08 | End: 1900-01-01

## 2023-12-08 RX ORDER — INSULIN GLARGINE 300 U/ML
300 INJECTION, SOLUTION SUBCUTANEOUS
Qty: 1 | Refills: 3 | Status: ACTIVE | COMMUNITY
Start: 2023-12-08 | End: 1900-01-01

## 2023-12-12 LAB
ALBUMIN SERPL ELPH-MCNC: 4.7 G/DL
ALP BLD-CCNC: 106 U/L
ALT SERPL-CCNC: 21 U/L
ANION GAP SERPL CALC-SCNC: 13 MMOL/L
AST SERPL-CCNC: 18 U/L
BILIRUB SERPL-MCNC: 0.7 MG/DL
BUN SERPL-MCNC: 16 MG/DL
CALCIUM SERPL-MCNC: 9.8 MG/DL
CHLORIDE SERPL-SCNC: 102 MMOL/L
CO2 SERPL-SCNC: 25 MMOL/L
CREAT SERPL-MCNC: 1.24 MG/DL
EGFR: 68 ML/MIN/1.73M2
GLUCOSE SERPL-MCNC: 268 MG/DL
HCT VFR BLD CALC: 49.7 %
HGB BLD-MCNC: 15.5 G/DL
MAGNESIUM SERPL-MCNC: 2 MG/DL
MCHC RBC-ENTMCNC: 25.5 PG
MCHC RBC-ENTMCNC: 31.2 GM/DL
MCV RBC AUTO: 81.9 FL
NT-PROBNP SERPL-MCNC: 56 PG/ML
PLATELET # BLD AUTO: 242 K/UL
POTASSIUM SERPL-SCNC: 5.1 MMOL/L
PROT SERPL-MCNC: 7.6 G/DL
RBC # BLD: 6.07 M/UL
RBC # FLD: 15.5 %
SODIUM SERPL-SCNC: 140 MMOL/L
WBC # FLD AUTO: 9.32 K/UL

## 2023-12-14 ENCOUNTER — APPOINTMENT (OUTPATIENT)
Dept: HEART FAILURE | Facility: CLINIC | Age: 58
End: 2023-12-14
Payer: COMMERCIAL

## 2023-12-14 VITALS
RESPIRATION RATE: 20 BRPM | OXYGEN SATURATION: 99 % | HEIGHT: 71 IN | DIASTOLIC BLOOD PRESSURE: 89 MMHG | HEART RATE: 84 BPM | SYSTOLIC BLOOD PRESSURE: 143 MMHG | WEIGHT: 212 LBS | BODY MASS INDEX: 29.68 KG/M2

## 2023-12-14 PROCEDURE — 99214 OFFICE O/P EST MOD 30 MIN: CPT

## 2023-12-14 RX ORDER — METOPROLOL SUCCINATE 50 MG/1
50 TABLET, EXTENDED RELEASE ORAL DAILY
Qty: 45 | Refills: 1 | Status: DISCONTINUED | COMMUNITY
Start: 2023-10-19 | End: 2023-12-14

## 2023-12-14 NOTE — HISTORY OF PRESENT ILLNESS
[FreeTextEntry1] : Mr. Iglesias 58M HTN, HLD, IDDM, JOE, CAD s/p MI (PCI pLAD 12/2021, PCI mRCA 3/2022, PCI to OM2 10/23), ICM HFrEF (LVEF 40%, LVIDd 4.6cm 2/2022, from 30-35% 12/2021) who presents today for follow up of his Cardiomyopathy. Primary cardiologist, Dr. Stephen Kay.  He presents to clinic today accompanied by his daughter. He states since last seen he has not started Farxiga or Losartan b/c he contracted the Flu shortly after visit. He endorses unchanged AT can walk on flat ground for 10mins before feeling SOB and does not climb stairs. States he walked to clinic today and did not stop but felt slight SOB once he arrived.  He remains with +2 pillows orthopnea, occasional PND. His home SBP readings range 120-130s and weight has ranged 204-205lbs with Lasix 20mg QOD, with good UOP.   He other wise, denies SOB at rest, LH/dizziness, abdominal discomfort, LE edema, palpitations, and syncope. His bowel and bladder habits are unchanged. He has been limiting fluid and sodium in his diet and taking his medications as directed. He does not use NSAIDs. He has not been admitted to the hospital or seen in the ER for HF in the interim

## 2023-12-14 NOTE — PHYSICAL EXAM
[Well Nourished] : well nourished [No Acute Distress] : no acute distress [No Xanthelasma] : no xanthelasma [Clear Lung Fields] : clear lung fields [Good Air Entry] : good air entry [Soft] : abdomen soft [Non Tender] : non-tender [Normal Gait] : normal gait [No Edema] : no edema [No Cyanosis] : no cyanosis [No Rash] : no rash [Moves all extremities] : moves all extremities [Alert and Oriented] : alert and oriented [de-identified] : JVP ~8 cm of H2O  [de-identified] : warm peripherally

## 2023-12-14 NOTE — ASSESSMENT
[FreeTextEntry1] :  58M HTN, HLD, IDDM, JOE, CAD s/p MI (PCI pLAD 12/2021, PCI mRCA 3/2022, PCI to OM2 10/23), ICM HFrEF (LVEF 40%, LVIDd 4.6cm 2/2022, from 30-35% 12/2021) who presents today for follow up of his Cardiomyopathy. He is ACC/AHA Stage C HF, endorsing NYHA Class II-III symptoms. He appears euvolemic on exam and is relatively hypertensive given his degree of LVD.    #ICM HFrEF  Repeat TTE reveals persistent LVD EF 42% on suboptimal therapies.  - Currently on Toprol 75mg QD; Will transition to Coreg 6.25mg BID for better BP control. Instructed to begin Farxiga 10mg QD today. Will reach out to Allergist Dr. Boxer for possible ARB challenge as his repeat TTE reveal persistent LVD in favor of inducting/optimizing neurohormonal blockade.  - Continue Furosemide 20mg QOD for now - Labs 12/8 reveal normal renal and hepatic chemistries and ProBNP 56; Will repeat in 7-10 days.  - No indication for primary prevention device at this time.   #CAD - BB as above, ASA and Brillinta - follows Dr. Vidales  #DM - Currently on insulin  - Follows Endocrinologist Dr. Brown   RTC in 3-4 weeks with HF NP and with Dr. Allen

## 2023-12-14 NOTE — CARDIOLOGY SUMMARY
[de-identified] :  3/16/23 barbara 7 min 8 METS. target HR achieved. NO CP. EKG changes. LVEF 60 post stress. Hypo ant/apic/sept. medium fixed defects ant.  [de-identified] :  12/6/23 TTE: LVIDd 4.6, LVEF 42%, Regional WMA, grade I DD, normal RVSF, normal BiAtrium, trace AR, trace MR, trace TR  TTE March 2022; LVEF 40, LVEDd 4.6, LA 3.4, normal wall thick. RV normal. no MR. min TR. RVSP 26  [de-identified] :  10/12/23 LHC: 30% LAD, 95% circ, s/p ANA to OM2, patent RCA and LAD stents

## 2023-12-14 NOTE — REVIEW OF SYSTEMS
[FreeTextEntry1] : 14 point ROS done and found to be negative or noncontributory other than noted in HPI.

## 2023-12-18 LAB — HBA1C MFR BLD HPLC: 7.7

## 2023-12-19 ENCOUNTER — APPOINTMENT (OUTPATIENT)
Dept: CARDIOLOGY | Facility: CLINIC | Age: 58
End: 2023-12-19
Payer: COMMERCIAL

## 2023-12-19 PROCEDURE — 99215 OFFICE O/P EST HI 40 MIN: CPT | Mod: 95

## 2024-01-05 ENCOUNTER — RX RENEWAL (OUTPATIENT)
Age: 59
End: 2024-01-05

## 2024-01-10 ENCOUNTER — APPOINTMENT (OUTPATIENT)
Dept: CARDIOLOGY | Facility: CLINIC | Age: 59
End: 2024-01-10
Payer: COMMERCIAL

## 2024-01-10 PROCEDURE — A9500: CPT

## 2024-01-10 PROCEDURE — 93015 CV STRESS TEST SUPVJ I&R: CPT

## 2024-01-10 PROCEDURE — 78452 HT MUSCLE IMAGE SPECT MULT: CPT

## 2024-01-18 ENCOUNTER — APPOINTMENT (OUTPATIENT)
Dept: ENDOCRINOLOGY | Facility: CLINIC | Age: 59
End: 2024-01-18
Payer: COMMERCIAL

## 2024-01-18 PROCEDURE — G0108 DIAB MANAGE TRN  PER INDIV: CPT

## 2024-01-18 RX ORDER — INSULIN DEGLUDEC INJECTION 100 U/ML
100 INJECTION, SOLUTION SUBCUTANEOUS DAILY
Qty: 300 | Refills: 0 | Status: ACTIVE | COMMUNITY
Start: 2024-01-18 | End: 1900-01-01

## 2024-01-29 ENCOUNTER — APPOINTMENT (OUTPATIENT)
Dept: HEART FAILURE | Facility: CLINIC | Age: 59
End: 2024-01-29
Payer: COMMERCIAL

## 2024-01-29 PROCEDURE — 99443: CPT

## 2024-02-01 ENCOUNTER — APPOINTMENT (OUTPATIENT)
Dept: PEDIATRIC ALLERGY IMMUNOLOGY | Facility: CLINIC | Age: 59
End: 2024-02-01
Payer: COMMERCIAL

## 2024-02-01 ENCOUNTER — NON-APPOINTMENT (OUTPATIENT)
Age: 59
End: 2024-02-01

## 2024-02-01 ENCOUNTER — APPOINTMENT (OUTPATIENT)
Dept: ALLERGY | Facility: CLINIC | Age: 59
End: 2024-02-01

## 2024-02-01 DIAGNOSIS — T88.7XXA UNSPECIFIED ADVERSE EFFECT OF DRUG OR MEDICAMENT, INITIAL ENCOUNTER: ICD-10-CM

## 2024-02-01 DIAGNOSIS — R22.0 LOCALIZED SWELLING, MASS AND LUMP, HEAD: ICD-10-CM

## 2024-02-01 DIAGNOSIS — T46.5X5A UNSPECIFIED ADVERSE EFFECT OF DRUG OR MEDICAMENT, INITIAL ENCOUNTER: ICD-10-CM

## 2024-02-01 DIAGNOSIS — Z53.09 PROCEDURE AND TREATMENT NOT CARRIED OUT BECAUSE OF OTHER CONTRAINDICATION: ICD-10-CM

## 2024-02-01 PROCEDURE — 99203 OFFICE O/P NEW LOW 30 MIN: CPT

## 2024-02-01 NOTE — ASSESSMENT
[FreeTextEntry1] :   58 year old  male with HTN, HL, IDDM, CAD, s/p MI, presents for evaluation of drug-induced angioedema.    History of  ANGIOEDEMA on angiotensin receptor blockers (ARBs): Timeline carefully reviewed with the patient and information from the chart. Patient started having episodes of mild facial swelling after starting Losartan, PRIOR  to taking Nitroglycerin and these episodes resolved after stopping ARB. Angioedema secondary to Nitroglycerin is extremely rare but has been reported with ARB, although much less commonly compared to  angiotensin-converting enzyme inhibitor (ACEi).  There is no available testing to confirm causality or predict reoccurrence since this is not a hypersensitivity, but a bradykinin-driven reaction. Furthermore, subsequent angioedema, may be more severe than prior.  Recommendations: - Strict avoidance of ARBs due to history of losartan-associated angioedema - Strict avoidance of ACEi  since angioedema is a known severe adverse effect of ACEi

## 2024-02-01 NOTE — REVIEW OF SYSTEMS
[Fever] : no fever [Urticaria] : no urticaria [Atopic Dermatitis] : no atopic dermatitis [FreeTextEntry5] : leg swelling

## 2024-02-01 NOTE — REASON FOR VISIT
[Home] : at home, [unfilled] , at the time of the visit. [Medical Office: ([facility])___] : at the medical office located in  [Other:____] : [unfilled] [Patient] : the patient

## 2024-02-01 NOTE — CONSULT LETTER
[Dear  ___] : Dear  [unfilled], [Consult Letter:] : I had the pleasure of evaluating your patient, [unfilled]. [Please see my note below.] : Please see my note below. [Consult Closing:] : Thank you very much for allowing me to participate in the care of this patient.  If you have any questions, please do not hesitate to contact me. [Sincerely,] : Sincerely, [FreeTextEntry3] : MD TRESSA Jimenez, SERENA Adult and Pediatric Allergy, Asthma and Clinical Immunology Associate Professor of Medicine and Pediatrics at   [facility], Adult Allergy and Immunology   [facility]   Division of Allergy, Asthma and Immunology   [address] [place] [address], [place] [address]   Phone [phone]  Fax [phone]

## 2024-02-01 NOTE — HISTORY OF PRESENT ILLNESS
[Asthma] : asthma [Allergic Rhinitis] : allergic rhinitis [Eczematous rashes] : eczematous rashes [Venom Reactions] : venom reactions [Food Allergies] : food allergies [de-identified] : MARISSA MACHADO  is a 58 year old  male with HTN, HL, IDDM, CAD, s/p MI  [facility] his medication reaction(s).  His cardiologist Dr Kay wants to start Entresto, but he had prior reaction to Losartan.   - In the spring of 2023, he developed angioedema after being on daily Losartan since 12/2021. He developed lip and tongue swelling, with his symptoms worsening over the following two days and later, developed some SOB.  His daughter took him to the [facility]  ER and he was admitted for 24 hours.   He was treated with famotidine and IV Solumedrol.   His symptoms gradually lessened over the 24 hours.   ~ 24 hours prior to that episode he took 2 nitroglycerin tablets under his tongue  - In ER nitroglycerin was stopped  and he continued with Losartan   - He was subsequently seen by Dr Boxer 5/8/23 who recommended stopping Losartan as the most likely culprit.   - Importantly, patient and his daughter report that he started having  milder episodes of lip swelling ~ 1/week since he started Losartan. Patient self-treated these episodes with topical medications. Patient denies having any facial swelling episodes since stopping losartan.  - Patient previously was treated with enalapril x 15 years. After he had an MI  in 12/21 - medication was changed to Losartan.

## 2024-02-06 ENCOUNTER — APPOINTMENT (OUTPATIENT)
Dept: PEDIATRIC ALLERGY IMMUNOLOGY | Facility: CLINIC | Age: 59
End: 2024-02-06

## 2024-02-20 ENCOUNTER — APPOINTMENT (OUTPATIENT)
Dept: HEART FAILURE | Facility: CLINIC | Age: 59
End: 2024-02-20
Payer: COMMERCIAL

## 2024-02-20 ENCOUNTER — APPOINTMENT (OUTPATIENT)
Dept: ENDOCRINOLOGY | Facility: CLINIC | Age: 59
End: 2024-02-20

## 2024-02-20 PROCEDURE — 99443: CPT | Mod: 93

## 2024-02-29 ENCOUNTER — APPOINTMENT (OUTPATIENT)
Dept: CARDIOLOGY | Facility: CLINIC | Age: 59
End: 2024-02-29
Payer: COMMERCIAL

## 2024-02-29 ENCOUNTER — NON-APPOINTMENT (OUTPATIENT)
Age: 59
End: 2024-02-29

## 2024-02-29 VITALS
OXYGEN SATURATION: 97 % | BODY MASS INDEX: 29.15 KG/M2 | SYSTOLIC BLOOD PRESSURE: 138 MMHG | DIASTOLIC BLOOD PRESSURE: 83 MMHG | WEIGHT: 209 LBS | HEART RATE: 91 BPM

## 2024-02-29 PROCEDURE — G2211 COMPLEX E/M VISIT ADD ON: CPT

## 2024-02-29 PROCEDURE — 93000 ELECTROCARDIOGRAM COMPLETE: CPT

## 2024-02-29 PROCEDURE — 99215 OFFICE O/P EST HI 40 MIN: CPT

## 2024-03-01 RX ORDER — TIRZEPATIDE 5 MG/.5ML
5 INJECTION, SOLUTION SUBCUTANEOUS
Qty: 3 | Refills: 0 | Status: ACTIVE | COMMUNITY
Start: 2024-03-01

## 2024-03-04 ENCOUNTER — RX RENEWAL (OUTPATIENT)
Age: 59
End: 2024-03-04

## 2024-03-11 ENCOUNTER — OUTPATIENT (OUTPATIENT)
Dept: OUTPATIENT SERVICES | Facility: HOSPITAL | Age: 59
LOS: 1 days | End: 2024-03-11
Payer: COMMERCIAL

## 2024-03-11 ENCOUNTER — TRANSCRIPTION ENCOUNTER (OUTPATIENT)
Age: 59
End: 2024-03-11

## 2024-03-11 VITALS
RESPIRATION RATE: 17 BRPM | SYSTOLIC BLOOD PRESSURE: 135 MMHG | DIASTOLIC BLOOD PRESSURE: 67 MMHG | OXYGEN SATURATION: 98 %

## 2024-03-11 VITALS
SYSTOLIC BLOOD PRESSURE: 136 MMHG | WEIGHT: 209 LBS | HEIGHT: 71 IN | DIASTOLIC BLOOD PRESSURE: 83 MMHG | RESPIRATION RATE: 16 BRPM | OXYGEN SATURATION: 100 % | TEMPERATURE: 98 F

## 2024-03-11 DIAGNOSIS — I20.0 UNSTABLE ANGINA: ICD-10-CM

## 2024-03-11 DIAGNOSIS — Z95.5 PRESENCE OF CORONARY ANGIOPLASTY IMPLANT AND GRAFT: Chronic | ICD-10-CM

## 2024-03-11 LAB
ANION GAP SERPL CALC-SCNC: 14 MMOL/L — SIGNIFICANT CHANGE UP (ref 5–17)
BUN SERPL-MCNC: 13 MG/DL — SIGNIFICANT CHANGE UP (ref 7–23)
CALCIUM SERPL-MCNC: 9.6 MG/DL — SIGNIFICANT CHANGE UP (ref 8.4–10.5)
CHLORIDE SERPL-SCNC: 104 MMOL/L — SIGNIFICANT CHANGE UP (ref 96–108)
CO2 SERPL-SCNC: 21 MMOL/L — LOW (ref 22–31)
CREAT SERPL-MCNC: 1.1 MG/DL — SIGNIFICANT CHANGE UP (ref 0.5–1.3)
EGFR: 78 ML/MIN/1.73M2 — SIGNIFICANT CHANGE UP
GLUCOSE BLDC GLUCOMTR-MCNC: 194 MG/DL — HIGH (ref 70–99)
GLUCOSE BLDC GLUCOMTR-MCNC: 242 MG/DL — HIGH (ref 70–99)
GLUCOSE SERPL-MCNC: 251 MG/DL — HIGH (ref 70–99)
HCT VFR BLD CALC: 47.7 % — SIGNIFICANT CHANGE UP (ref 39–50)
HGB BLD-MCNC: 15.9 G/DL — SIGNIFICANT CHANGE UP (ref 13–17)
MAGNESIUM SERPL-MCNC: 2.1 MG/DL — SIGNIFICANT CHANGE UP (ref 1.6–2.6)
MCHC RBC-ENTMCNC: 26.5 PG — LOW (ref 27–34)
MCHC RBC-ENTMCNC: 33.3 GM/DL — SIGNIFICANT CHANGE UP (ref 32–36)
MCV RBC AUTO: 79.5 FL — LOW (ref 80–100)
NRBC # BLD: 0 /100 WBCS — SIGNIFICANT CHANGE UP (ref 0–0)
PLATELET # BLD AUTO: 252 K/UL — SIGNIFICANT CHANGE UP (ref 150–400)
POTASSIUM SERPL-MCNC: 4.4 MMOL/L — SIGNIFICANT CHANGE UP (ref 3.5–5.3)
POTASSIUM SERPL-SCNC: 4.4 MMOL/L — SIGNIFICANT CHANGE UP (ref 3.5–5.3)
RBC # BLD: 6 M/UL — HIGH (ref 4.2–5.8)
RBC # FLD: 14.2 % — SIGNIFICANT CHANGE UP (ref 10.3–14.5)
SODIUM SERPL-SCNC: 139 MMOL/L — SIGNIFICANT CHANGE UP (ref 135–145)
WBC # BLD: 8.79 K/UL — SIGNIFICANT CHANGE UP (ref 3.8–10.5)
WBC # FLD AUTO: 8.79 K/UL — SIGNIFICANT CHANGE UP (ref 3.8–10.5)

## 2024-03-11 PROCEDURE — 93010 ELECTROCARDIOGRAM REPORT: CPT | Mod: 76

## 2024-03-11 PROCEDURE — C1894: CPT

## 2024-03-11 PROCEDURE — C1769: CPT

## 2024-03-11 PROCEDURE — 83735 ASSAY OF MAGNESIUM: CPT

## 2024-03-11 PROCEDURE — 93454 CORONARY ARTERY ANGIO S&I: CPT | Mod: 59

## 2024-03-11 PROCEDURE — 80048 BASIC METABOLIC PNL TOTAL CA: CPT

## 2024-03-11 PROCEDURE — 93454 CORONARY ARTERY ANGIO S&I: CPT | Mod: 26,59

## 2024-03-11 PROCEDURE — 92928 PRQ TCAT PLMT NTRAC ST 1 LES: CPT | Mod: RC

## 2024-03-11 PROCEDURE — 93005 ELECTROCARDIOGRAM TRACING: CPT

## 2024-03-11 PROCEDURE — 85027 COMPLETE CBC AUTOMATED: CPT

## 2024-03-11 PROCEDURE — C9600: CPT | Mod: RC

## 2024-03-11 PROCEDURE — 99152 MOD SED SAME PHYS/QHP 5/>YRS: CPT

## 2024-03-11 PROCEDURE — 82962 GLUCOSE BLOOD TEST: CPT

## 2024-03-11 PROCEDURE — C1887: CPT

## 2024-03-11 PROCEDURE — C1874: CPT

## 2024-03-11 RX ORDER — CARVEDILOL PHOSPHATE 80 MG/1
6.25 CAPSULE, EXTENDED RELEASE ORAL EVERY 12 HOURS
Refills: 0 | Status: DISCONTINUED | OUTPATIENT
Start: 2024-03-11 | End: 2024-03-25

## 2024-03-11 RX ORDER — ROSUVASTATIN CALCIUM 5 MG/1
1 TABLET ORAL
Refills: 0 | DISCHARGE

## 2024-03-11 RX ORDER — SODIUM CHLORIDE 9 MG/ML
1000 INJECTION, SOLUTION INTRAVENOUS
Refills: 0 | Status: DISCONTINUED | OUTPATIENT
Start: 2024-03-11 | End: 2024-03-25

## 2024-03-11 RX ORDER — DEXTROSE 50 % IN WATER 50 %
15 SYRINGE (ML) INTRAVENOUS ONCE
Refills: 0 | Status: DISCONTINUED | OUTPATIENT
Start: 2024-03-11 | End: 2024-03-25

## 2024-03-11 RX ORDER — ASPIRIN/CALCIUM CARB/MAGNESIUM 324 MG
81 TABLET ORAL DAILY
Refills: 0 | Status: DISCONTINUED | OUTPATIENT
Start: 2024-03-11 | End: 2024-03-25

## 2024-03-11 RX ORDER — INSULIN LISPRO 100/ML
VIAL (ML) SUBCUTANEOUS
Refills: 0 | Status: DISCONTINUED | OUTPATIENT
Start: 2024-03-11 | End: 2024-03-25

## 2024-03-11 RX ORDER — INSULIN LISPRO 100/ML
16 VIAL (ML) SUBCUTANEOUS
Refills: 0 | Status: DISCONTINUED | OUTPATIENT
Start: 2024-03-11 | End: 2024-03-25

## 2024-03-11 RX ORDER — DEXTROSE 50 % IN WATER 50 %
12.5 SYRINGE (ML) INTRAVENOUS ONCE
Refills: 0 | Status: DISCONTINUED | OUTPATIENT
Start: 2024-03-11 | End: 2024-03-25

## 2024-03-11 RX ORDER — SODIUM CHLORIDE 9 MG/ML
1000 INJECTION INTRAMUSCULAR; INTRAVENOUS; SUBCUTANEOUS
Refills: 0 | Status: DISCONTINUED | OUTPATIENT
Start: 2024-03-11 | End: 2024-03-25

## 2024-03-11 RX ORDER — DEXTROSE 50 % IN WATER 50 %
25 SYRINGE (ML) INTRAVENOUS ONCE
Refills: 0 | Status: DISCONTINUED | OUTPATIENT
Start: 2024-03-11 | End: 2024-03-25

## 2024-03-11 RX ORDER — ATORVASTATIN CALCIUM 80 MG/1
40 TABLET, FILM COATED ORAL AT BEDTIME
Refills: 0 | Status: DISCONTINUED | OUTPATIENT
Start: 2024-03-11 | End: 2024-03-25

## 2024-03-11 RX ORDER — CHOLECALCIFEROL (VITAMIN D3) 125 MCG
1 CAPSULE ORAL
Qty: 0 | Refills: 0 | DISCHARGE

## 2024-03-11 RX ORDER — TICAGRELOR 90 MG/1
1 TABLET ORAL
Qty: 180 | Refills: 3
Start: 2024-03-11 | End: 2025-03-05

## 2024-03-11 RX ORDER — TICAGRELOR 90 MG/1
90 TABLET ORAL EVERY 12 HOURS
Refills: 0 | Status: DISCONTINUED | OUTPATIENT
Start: 2024-03-11 | End: 2024-03-25

## 2024-03-11 RX ORDER — CARVEDILOL PHOSPHATE 80 MG/1
1 CAPSULE, EXTENDED RELEASE ORAL
Refills: 0 | DISCHARGE

## 2024-03-11 RX ORDER — GLUCAGON INJECTION, SOLUTION 0.5 MG/.1ML
1 INJECTION, SOLUTION SUBCUTANEOUS ONCE
Refills: 0 | Status: DISCONTINUED | OUTPATIENT
Start: 2024-03-11 | End: 2024-03-25

## 2024-03-11 RX ORDER — FUROSEMIDE 40 MG
1 TABLET ORAL
Refills: 0 | DISCHARGE

## 2024-03-11 RX ADMIN — Medication 2: at 12:30

## 2024-03-11 RX ADMIN — Medication 81 MILLIGRAM(S): at 08:27

## 2024-03-11 RX ADMIN — Medication 16 UNIT(S): at 12:30

## 2024-03-11 RX ADMIN — SODIUM CHLORIDE 100 MILLILITER(S): 9 INJECTION INTRAMUSCULAR; INTRAVENOUS; SUBCUTANEOUS at 07:47

## 2024-03-11 RX ADMIN — TICAGRELOR 90 MILLIGRAM(S): 90 TABLET ORAL at 08:25

## 2024-03-11 NOTE — ASU DISCHARGE PLAN (ADULT/PEDIATRIC) - DO NOT SUBMERGE DURATION DAY(S)
Problem: Mobility Impaired (Adult and Pediatric)  Goal: *Acute Goals and Plan of Care (Insert Text)  Physical Therapy Goals  Initiated 10/15/2017  1. Patient will transfer from bed to chair and chair to bed with modified independence using the least restrictive device within 7 day(s). 3.  Patient will perform sit to stand with modified independence within 7 day(s). 4.  Patient will ambulate with modified independence for 300feet with the least restrictive device within 7 day(s). 5.  Patient will ascend/descend 2 stairs with 1 handrail(s) with modified independence within 7 day(s). physical Therapy TREATMENT  Patient: Dorie Herr (71 y.o. male)  Date: 10/18/2017  Diagnosis: UTI (urinary tract infection)  BPH OBSTRUCTION <principal problem not specified>  Procedure(s) (LRB):  TRANSURETHRAL RESECTION OF PROSTATE WITH GREENLIGHT (N/A)    Precautions: Fall    ASSESSMENT:  Patient received sitting in the chair. Just worked with OT. He was pleasant and cooperative, looking forward to walking in the vides. Discussed his gait deviations (decreased toe heel on left, increased base of support), patient states this is his baseline. Patient with decreased anterior weight shift and relies on upper extremities with sit to stand. Reviewed exercise program at home - patient goes 2-3 times a week to [employer]. He states he walks for a mile on the treadmill, a mile on the stationary bike, does leg press and bench press. Recommended adding row/lats. He also reports going to Outpatient PT and would like to eventually continue that after discharge home. Progression toward goals:  [x]    Improving appropriately and progressing toward goals  []    Improving slowly and progressing toward goals  []    Not making progress toward goals and plan of care will be adjusted     PLAN:  Patient continues to benefit from skilled intervention to address the above impairments.   Continue treatment per established plan of care.  Discharge Recommendations:  Patient wants to go to [facility]  Further Equipment Recommendations for Discharge:  Already has a rollator     SUBJECTIVE:   Patient stated I don't want to overdo it. I have surgery tomorrow.     OBJECTIVE DATA SUMMARY:   Critical Behavior:  Neurologic State: Alert  Orientation Level: Oriented X4  Cognition: Follows commands  Safety/Judgement: Awareness of environment, Fall prevention, Decreased awareness of need for safety, Insight into deficits  Functional Mobility Training:  Bed Mobility:                    Transfers:  Sit to Stand: Contact guard assistance; Additional time  Stand to Sit: Contact guard assistance; Additional time                             Balance:     Ambulation/Gait Training:  Distance (ft): 100 Feet (ft) (twice, with sitting rest in between)  Assistive Device: Walker, rollator;Gait belt  Ambulation - Level of Assistance: Contact guard assistance                                               Stairs: Therapeutic Exercises:   Offered to do sitting or standing exercises - patient does not want to \"over do it\"  Pain:  Pain Scale 1: Numeric (0 - 10)  Pain Intensity 1: 0              Activity Tolerance:   No nausea or shortness of breath with mobility  Please refer to the flowsheet for vital signs taken during this treatment.   After treatment:   []    Patient left in no apparent distress sitting up in chair  [x]    Patient left in no apparent distress in bed  [x]    Call bell left within reach  [x]    Nursing notified  []    Caregiver present  []    Bed alarm activated    COMMUNICATION/COLLABORATION:   The patients plan of care was discussed with: Registered Nurse    Orlando Mendez, PT   Time Calculation: 17 mins 7 DAYS

## 2024-03-11 NOTE — H&P CARDIOLOGY - NSALCOHOLUSECOMMENT_GEN_ALL_CORE_FT
49 y/o female hx cad stent in 2021, fibromyalgia, lupus, c/o 2-3 minutes sharp generlized cp earlier today. resolved by arrival. no diaphoresis, no sob. no f/c, no hx dvt/pe, no leg pain/swelling. no other complaints. feels well and requesting to leave before tonight as she has a  to go to.     ROS: No fever/chills. No eye pain/changes in vision, No ear pain/sore throat/dysphagia, No palpitations. No SOB/cough/. No abdominal pain, N/V/D, no black/bloody bm. No dysuria/frequency/discharge, No headache. No Dizziness.    No rashes or breaks in skin. No numbness/tingling/weakness. denies

## 2024-03-11 NOTE — H&P CARDIOLOGY - HISTORY OF PRESENT ILLNESS
58 year old male with PMH of HTN, HLD, T2DM on insulin, Chronic systolic HFrEF (30%) and known CAD s/p MI in 2021 with subsequent ANA to pLAD at that time, ANA to pRCA 70% in 2022, and most recent ANA to OM2 in October 2023, presents with c/o left sided chest pain over the past few months. Pain is most noticeable with exertion and after eating. Pt had a nuclear stress test 1/10/24 that was abnormal and showed severe medium sized defect in the distal apical anterior wall that partially reverses c/w infarct with deborah-infarct ischemia and partially reversible defect int he inferior wall that corrects with prone imaging. Evaluated by cardiologist, Dr. Kay. Referred for further evaluation.   < from: Cardiac Catheterization (10.12.23 @ 13:38) >  Procedures Performed   Procedures:    1.    Arterial Access - Right Radial     2.    Diagnostic Coronary Angiography   3.    PCI: ANA     Indications:               ACS greater than 24 hours   PCI Status:               elective     Conclusions:   Successful PCI with ANA to OM2 lesionwhich progressed since last  cath.  RCA and LAD stents are patent.  DAPT for 3    mths     Procedure Narrative:   The risks and alternatives of the procedures and conscious sedation  were explained to the patient and informed consent was  obtained. The patient was brought to the cath lab and placed on the  exam table.  Access   Right radial artery:   The puncture site was infiltrated with 1% Lidocaine. Vascular access  was obtained using modified seldinger technique.    Diagnostic Findings:     Coronary Angiography   The coronary circulation is right dominant.      LM   Left main artery: Angiography shows mild atherosclerosis.      LAD   Mid left anterior descending: There is a 30 % stenosis.      CX   Circumflex: Angiography shows mild atherosclerosis. First obtuse  marginal: There is a 95 % stenosis.    RCA   Right coronary artery: Angiography shows mild atherosclerosis. Second  right posterolateral: The segment is small.    < end of copied text >   58 year old male with PMH of HTN, HLD, T2DM on insulin, Chronic systolic HFrEF (30%) and known CAD s/p MI in 2021 with subsequent ANA to pLAD at that time, ANA to pRCA 70% in 2022, and most recent ANA to OM2 in October 2023, presents with c/o left sided chest pain over the past few months. Pain is most noticeable with exertion and after eating. Reports intermittent associated dyspnea. Pt had a nuclear stress test 1/10/24 that was abnormal and showed severe medium sized defect in the distal apical anterior wall that partially reverses c/w infarct with deborah-infarct ischemia and partially reversible defect int he inferior wall that corrects with prone imaging. Evaluated by cardiologist, Dr. Kay. Referred for further evaluation.   < from: Cardiac Catheterization (10.12.23 @ 13:38) >  Procedures Performed   Procedures:    1.    Arterial Access - Right Radial     2.    Diagnostic Coronary Angiography   3.    PCI: ANA     Indications:               ACS greater than 24 hours   PCI Status:               elective     Conclusions:   Successful PCI with ANA to OM2 lesionwhich progressed since last  cath.  RCA and LAD stents are patent.  DAPT for 3    mths     Procedure Narrative:   The risks and alternatives of the procedures and conscious sedation  were explained to the patient and informed consent was  obtained. The patient was brought to the cath lab and placed on the  exam table.  Access   Right radial artery:   The puncture site was infiltrated with 1% Lidocaine. Vascular access  was obtained using modified seldinger technique.    Diagnostic Findings:     Coronary Angiography   The coronary circulation is right dominant.      LM   Left main artery: Angiography shows mild atherosclerosis.      LAD   Mid left anterior descending: There is a 30 % stenosis.      CX   Circumflex: Angiography shows mild atherosclerosis. First obtuse  marginal: There is a 95 % stenosis.    RCA   Right coronary artery: Angiography shows mild atherosclerosis. Second  right posterolateral: The segment is small.    < end of copied text >   58 year old male with PMH of HTN, HLD, T2DM on insulin, Chronic systolic HFrEF (EF 42% on 12/2023 TTE) and known CAD s/p MI in 2021 with subsequent ANA to pLAD at that time, ANA to pRCA 70% in 2022, and most recent ANA to OM2 in October 2023, presents with c/o left sided chest pain over the past few months. Pain is most noticeable with exertion and after eating. Reports intermittent associated dyspnea. Pt had a nuclear stress test 1/10/24 that was abnormal and showed severe medium sized defect in the distal apical anterior wall that partially reverses c/w infarct with deborah-infarct ischemia and partially reversible defect int he inferior wall that corrects with prone imaging. Evaluated by cardiologist, Dr. Kay. Referred for further evaluation.   < from: Cardiac Catheterization (10.12.23 @ 13:38) >  Procedures Performed   Procedures:    1.    Arterial Access - Right Radial     2.    Diagnostic Coronary Angiography   3.    PCI: ANA     Indications:               ACS greater than 24 hours   PCI Status:               elective     Conclusions:   Successful PCI with ANA to OM2 lesionwhich progressed since last  cath.  RCA and LAD stents are patent.  DAPT for 3    mths     Procedure Narrative:   The risks and alternatives of the procedures and conscious sedation  were explained to the patient and informed consent was  obtained. The patient was brought to the cath lab and placed on the  exam table.  Access   Right radial artery:   The puncture site was infiltrated with 1% Lidocaine. Vascular access  was obtained using modified seldinger technique.    Diagnostic Findings:     Coronary Angiography   The coronary circulation is right dominant.      LM   Left main artery: Angiography shows mild atherosclerosis.      LAD   Mid left anterior descending: There is a 30 % stenosis.      CX   Circumflex: Angiography shows mild atherosclerosis. First obtuse  marginal: There is a 95 % stenosis.    RCA   Right coronary artery: Angiography shows mild atherosclerosis. Second  right posterolateral: The segment is small.    < end of copied text >

## 2024-03-11 NOTE — H&P CARDIOLOGY - EKG AND INTERPRETATION
NSR  79 Calcipotriene Pregnancy And Lactation Text: The use of this medication during pregnancy or lactation is not recommended as there is insufficient data.

## 2024-03-11 NOTE — ASU DISCHARGE PLAN (ADULT/PEDIATRIC) - CARE PROVIDER_API CALL
Jad Kay  Cardiovascular Disease  [address]  [place] [address]  Phone: [phone]  Fax: [phone]  Established Patient  Follow Up Time: 2 weeks

## 2024-03-18 ENCOUNTER — RX RENEWAL (OUTPATIENT)
Age: 59
End: 2024-03-18

## 2024-03-18 ENCOUNTER — APPOINTMENT (OUTPATIENT)
Dept: HEART FAILURE | Facility: CLINIC | Age: 59
End: 2024-03-18
Payer: COMMERCIAL

## 2024-03-18 VITALS
HEIGHT: 71 IN | OXYGEN SATURATION: 99 % | HEART RATE: 82 BPM | TEMPERATURE: 98.6 F | DIASTOLIC BLOOD PRESSURE: 85 MMHG | WEIGHT: 213 LBS | SYSTOLIC BLOOD PRESSURE: 146 MMHG | BODY MASS INDEX: 29.82 KG/M2

## 2024-03-18 DIAGNOSIS — I87.2 VENOUS INSUFFICIENCY (CHRONIC) (PERIPHERAL): ICD-10-CM

## 2024-03-18 DIAGNOSIS — Z95.5 PRESENCE OF CORONARY ANGIOPLASTY IMPLANT AND GRAFT: ICD-10-CM

## 2024-03-18 PROCEDURE — 93000 ELECTROCARDIOGRAM COMPLETE: CPT

## 2024-03-18 PROCEDURE — 99215 OFFICE O/P EST HI 40 MIN: CPT

## 2024-03-18 RX ORDER — FUROSEMIDE 20 MG/1
20 TABLET ORAL
Qty: 30 | Refills: 3 | Status: ACTIVE | COMMUNITY
Start: 2023-11-13

## 2024-03-18 RX ORDER — DAPAGLIFLOZIN 10 MG/1
10 TABLET, FILM COATED ORAL
Qty: 30 | Refills: 5 | Status: ACTIVE | COMMUNITY
Start: 2023-12-08 | End: 1900-01-01

## 2024-03-18 NOTE — HISTORY OF PRESENT ILLNESS
[FreeTextEntry1] : Mr. Iglesias 58M HTN, HLD, IDDM, JOE, CAD s/p MI (PCI pLAD 12/2021, PCI mRCA 3/2022, PCI to OM2 10/23), ICM HFrEF (LVEF 40%, LVIDd 4.6cm 2/2022, from 30-35% 12/2021) who presents today for initial consultation for further evaluation of his heart failure, referred by his cardiologist, Dr. Stephen Kay.

## 2024-03-18 NOTE — CARDIOLOGY SUMMARY
[de-identified] :  12/6/23 TTE: LVIDd 4.6, LVEF 42%, Regional WMA, grade I DD, normal RVSF, normal BiAtrium, trace AR, trace MR, trace TR  TTE March 2022; LVEF 40, LVEDd 4.6, LA 3.4, normal wall thick. RV normal. no MR. min TR. RVSP 26   [de-identified] :  1/11/24: NM Stress, post gated LVEF  60%, post EDV 97ml and systolic 39ml, There is a severe, medium sized defect in the distal-apical anterior wall that partially reverses, consistent with infarct with deborah-infarct ischemia. There is also a partially reversible defect in the inferior wall that corrects with prone imaging, suggestive of attenuation artifact.   3/16/23 barbara 7 min 8 METS. target HR achieved. NO CP. EKG changes. LVEF 60 post stress. Hypo ant/apic/sept. medium fixed defects ant.   [de-identified] :  3/11/24 LHC: mid LAD 30% prior stent patent, LAD 1st Diag 100%, Cx 40%, RCA 90%, s/p ANA x1 RPL  10/12/23 LHC: 30% LAD, 95% circ, s/p ANA to OM2, patent RCA and LAD stents

## 2024-03-18 NOTE — DISCUSSION/SUMMARY
[FreeTextEntry1] : referred by Dr Zavaleta.  Last seen Nov 2023 58yrs, HTN, lipids,IDDM Dec 2021- CP. PCI Prox LAD. LVEF 30-35 at that time March 2022 PCI RCA. TTE March 2022; LVEF 40, LVEDd 4.6, LA 3.4, normal wall thick. RV normal. no MR. min TR. RVSP 26  [facility] Oct 2023: PCI OM1 95 RCA LAD stents patent  Driven by ongoing CP.  May 2023. ? anaphylaxis/ angioedema to losartan after being on it for a year. seen by [facility] allergist.  March 2023 barbara 7 min 8 METS. target HR achieved. NO CP. EKG changes. LVEF 60 post stress. Hypo ant/apic/sept. medium fixed defects ant.  Seen one time by me- at that time worsening SOB fixed with diuretics.  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  meds: coreg 12.5 bid, farxega 5, lasix 20 QOD. asa, brelinta, crestor, insulin, GLP1,  Jan 2024. Stress Nuclear post gated LVEF 60, LVEDV 90 - reversible defects.  CP in febuary. [facility] March 2024 . patent stents mid  LAD. D1 100%, Cx 40%, patent stents, RCA 90, ANA to RPL. Better since PCI. walks 1/4 mile. no orthopnes no SOB.  Home 120/-130/ 146/85, 82, 213 (stable) euvolemic. no LE edema. abdo soft.  recent labs March 2024: K 4.4, BUN 13, Cr 1.1  stable- no evidence of HF. nuclear stress normal LVfx in jan.  Plan:  make coreg 18.75 bid. Farxega 10 and d/c diuretics.  seeing Dr Kay tomorrow  ? cardiac rehab vs exercise prescription.  Franko Allen  45 min

## 2024-03-29 ENCOUNTER — RX RENEWAL (OUTPATIENT)
Age: 59
End: 2024-03-29

## 2024-03-29 PROBLEM — I50.20 HFREF (HEART FAILURE WITH REDUCED EJECTION FRACTION): Status: ACTIVE | Noted: 2021-12-28

## 2024-04-02 ENCOUNTER — APPOINTMENT (OUTPATIENT)
Dept: CARDIOLOGY | Facility: CLINIC | Age: 59
End: 2024-04-02
Payer: COMMERCIAL

## 2024-04-02 DIAGNOSIS — I50.20 UNSPECIFIED SYSTOLIC (CONGESTIVE) HEART FAILURE: ICD-10-CM

## 2024-04-02 PROCEDURE — 99215 OFFICE O/P EST HI 40 MIN: CPT

## 2024-04-02 PROCEDURE — G2211 COMPLEX E/M VISIT ADD ON: CPT

## 2024-04-09 ENCOUNTER — APPOINTMENT (OUTPATIENT)
Dept: CARDIOLOGY | Facility: CLINIC | Age: 59
End: 2024-04-09

## 2024-04-09 ENCOUNTER — NON-APPOINTMENT (OUTPATIENT)
Age: 59
End: 2024-04-09

## 2024-04-09 NOTE — REASON FOR VISIT
[Home] : at home, [unfilled] , at the time of the visit. [Medical Office: ([facility])___] : at the medical office located in  [Family Member] : family member [Verbal consent obtained from patient] : the patient, [unfilled] [Intake Visit] : an intake visit [Assessment] : an assessment [FreeTextEntry1] : ITP to be finalized, reviewed and manually signed by Dr. Sheehan prior to session 1; Clinical indication for cardiac rehabilitation: PCI

## 2024-04-09 NOTE — HISTORY OF PRESENT ILLNESS
[Type II Diabetes] : Type II Diabetes [Is Patient aware of signs and symptoms of hypoglycemia and hyperglycemia?] : patient is aware of signs and symptoms of hypoglycemia and hyperglycemia [Does Patient follow with other health care specialists for comprehensive diabetes care?] : Does Patient follow with other health care specialists for comprehensive diabetes care? Yes [Home monitoring of blood sugar] : home monitoring of blood sugar [Following diabetes way of eating] : following diabetes way of eating [Individualized exercise program as developed at cardiac rehabilitation] : individualized exercise program as developed at cardiac rehabilitation [Overview of diabetes and cardiovascular disease] : overview of diabetes and cardiovascular disease [Diagnosis of prediabetes, diabetes] : diagnosis of prediabetes, diabetes [Role of lifestyle behaviors in diabetes management] : role of lifestyle behaviors in diabetes management [Yes, continue with Diabetes plan] : Yes, continue with Diabetes plan [FreeTextEntry2] : 150 [FreeTextEntry3] : 100-761 [Does patient monitor blood pressure at home?] : patient monitors blood pressure at home [Low sodium diet] : low sodium diet [Other diet strategies] : other diet strategies [Stress management techniques] : stress management techniques [Exercise] : exercise [Weight management] : weight management [Patient will verbalize factors contributing to improved blood pressure management] : Patient will verbalize factors contributing to improved blood pressure management [Patient will maintain blood pressure < 130/80 mmHg] : patient will maintain blood pressure < 130/80 mmHg [Medication Adherence] : medication adherence [Home monitoring of blood pressure] : home monitoring of blood pressure [Diet changes including (healthy heart eating, less processed foods, low sodium diet)] : Diet changes including (healthy heart eating, less processed foods, low sodium diet) [Halt the salt] : halt the salt [Guidelines for blood pressure management] : guidelines for blood pressure management [Define hypertension] : define hypertension [Signs and symptoms] : signs and symptoms [Role of lifestyle behaviors in blood pressure management] : role of lifestyle behaviors in blood pressure management [Exercise and blood pressure] : exercise and blood pressure [Food labels] : food labels [Processed food] : processed food [Eating out] : eating out [Yes, continue with Hypertension plan] : Yes, continue with Hypertension plan [Maintenance] : Maintenance [Former smoker] : former smoker [> 12 months] : > 12 months [Patient will have a relapse plan] : Patient will have a relapse plan [Patient will remain a non-smoker] : Patient will remain a non-smoker [Continue repetition of the discussion] : continue repetition of the discussion [Yes, continue with Smoking/Tobacco Cession plan] : Yes, continue with Smoking/Tobacco Cession plan [Height: ___] : Height: [unfilled] [Weight: ___ lbs] : Weight: [unfilled] lbs [Does patient monitor weight at home?] : Does patient monitor weight at home? Yes [Recent history of weight gain or weight loss?] : Recent history of weight gain or weight loss? yes [Medications for weight management?] : Medications for weight management? no [Does patient follow with other health care specialists for weight management?] : Does patient follow with other health care specialists for weight management? no [Patient will lose 0.5 to 1 lb per week] : Patient will lose 0.5 to 1lb per week [Patient will lose inches off waist] : patient will lose inches off waist [Patient will weigh self daily] : patient will weigh self daily [Monitoring of weight at home and at cardiac rehabilitation] : Monitoring of weight at home and at cardiac rehabilitation [Calories and healthy weight management] : Calories and healthy weight management [Exercise and diet] : exercise and diet [Weight gain and heart failure implications] : weight gain and heart failure implications [Yes, continue with Weight Management plan] : Yes, continue with weight management plan [Motivation/Desire] : motivation/desire [PCI/Stent] : PCI/stent [Age] : age [HLD] : HLD [Hypertension] : hypertension [Diabetes Mellitus] : diabetes mellitus [Sedentary] : sedentary [Overweight/Obesity] : overweight/obesity [Heart Failure] : heart failure [Angina with exercise] : no angina with exercise [] : no [Treadmill] : treadmill [Recumbent bike] : recumbent bike [Upright exercise bike] : upright exercise bike [BioStep] : BioStep [Elliptical] : elliptical [Upper body ergometer] : upper body ergometer [Stair Climber] : stair climber [Walking] : walking [Perform aerobic activity for ___ consecutive minutes] : perform aerobic activity for [unfilled] consecutive minutes [Individualized Exercise Rx] : individualized exercise Rx [Home exercise] : home exercise [Teach back utilized] : teach back utilized [Yes, per exercise prescription, policy] : Yes, per exercise prescription, policy [FreeTextEntry1] : Dr. Kay [FreeTextEntry5] : Alina Hernandez [FreeTextEntry7] : ADLS [de-identified] : Intake: Patient verbalized good understanding of the importance of incorporating exercise into daily routine. Patient admits that he is more sedentary than usual due to the weather being cold and not getting out of the house. Patient is looking forward to warm weather and will begin walking outside in the coming weeks for exercise.  [Breathing exercises] : breathing exercises [Discuss overview of emotional health supportive modalities] : Discuss overview of emotional health supportive modalities [Stress management] : stress management [Yes, continue with psychosocial plan] : Yes, continue with psychosocial plan [FreeTextEntry9] : Intake: Admits to feeling "down in the dumps" regarding recent cardiac diagnoses. Patient given resources for behavioral health and plans to reach out and find a therapist to speak with. Patient lives at home with his daughter Sonia.  [Assessment] : Assessment [Action] : Action [Is patient on medication for hyperlipidemia?] : Is patient on medication for hyperlipidemia? Yes [Is Patient adherent with medication?] : patient is adherent with medication [Family] : family [Patient has seen registered dietitian in the past?] : Patient has seen registered dietitian in the past? Yes [Patient is interested in seeing a registered dietitian?] : Patient is interested in seeing a registered dietitian? No [Patient will include healthy diet pattern approaches to healthy eating] : Patient will include healthy diet pattern approaches to healthy eating [Discuss an overview of healthy eating plan] : Discuss an overview of healthy eating plan [Yes, continue with nutrition plan] : Yes, continue with nutrition plan [In progress] : in progress [FreeTextEntry6] : rice [FreeTextEntry8] : Intake: Patient has made drastic dietary changes since his most recent PCI. Patient verbalizes excellent understanding of heart healthy diet. Has transitioned from white rice to brown rice and incorporates vegetables into each meal.

## 2024-04-09 NOTE — ASSESSMENT
[FreeTextEntry1] : Patient s/p PCI to RPL with I ANA, EF 42%, stable and appropriate for phase 2 cardiac rehabilitation at this time.

## 2024-04-09 NOTE — HISTORY OF PRESENT ILLNESS
[Type II Diabetes] : Type II Diabetes [Is Patient aware of signs and symptoms of hypoglycemia and hyperglycemia?] : patient is aware of signs and symptoms of hypoglycemia and hyperglycemia [Does Patient follow with other health care specialists for comprehensive diabetes care?] : Does Patient follow with other health care specialists for comprehensive diabetes care? Yes [Home monitoring of blood sugar] : home monitoring of blood sugar [Following diabetes way of eating] : following diabetes way of eating [Individualized exercise program as developed at cardiac rehabilitation] : individualized exercise program as developed at cardiac rehabilitation [Overview of diabetes and cardiovascular disease] : overview of diabetes and cardiovascular disease [Diagnosis of prediabetes, diabetes] : diagnosis of prediabetes, diabetes [Role of lifestyle behaviors in diabetes management] : role of lifestyle behaviors in diabetes management [Yes, continue with Diabetes plan] : Yes, continue with Diabetes plan [FreeTextEntry2] : 150 [FreeTextEntry3] : 100-535 [Does patient monitor blood pressure at home?] : patient monitors blood pressure at home [Low sodium diet] : low sodium diet [Other diet strategies] : other diet strategies [Stress management techniques] : stress management techniques [Exercise] : exercise [Weight management] : weight management [Patient will verbalize factors contributing to improved blood pressure management] : Patient will verbalize factors contributing to improved blood pressure management [Patient will maintain blood pressure < 130/80 mmHg] : patient will maintain blood pressure < 130/80 mmHg [Medication Adherence] : medication adherence [Home monitoring of blood pressure] : home monitoring of blood pressure [Diet changes including (healthy heart eating, less processed foods, low sodium diet)] : Diet changes including (healthy heart eating, less processed foods, low sodium diet) [Halt the salt] : halt the salt [Guidelines for blood pressure management] : guidelines for blood pressure management [Define hypertension] : define hypertension [Signs and symptoms] : signs and symptoms [Role of lifestyle behaviors in blood pressure management] : role of lifestyle behaviors in blood pressure management [Exercise and blood pressure] : exercise and blood pressure [Food labels] : food labels [Processed food] : processed food [Eating out] : eating out [Yes, continue with Hypertension plan] : Yes, continue with Hypertension plan [Maintenance] : Maintenance [Former smoker] : former smoker [> 12 months] : > 12 months [Patient will have a relapse plan] : Patient will have a relapse plan [Patient will remain a non-smoker] : Patient will remain a non-smoker [Continue repetition of the discussion] : continue repetition of the discussion [Yes, continue with Smoking/Tobacco Cession plan] : Yes, continue with Smoking/Tobacco Cession plan [Height: ___] : Height: [unfilled] [Weight: ___ lbs] : Weight: [unfilled] lbs [Does patient monitor weight at home?] : Does patient monitor weight at home? Yes [Recent history of weight gain or weight loss?] : Recent history of weight gain or weight loss? yes [Medications for weight management?] : Medications for weight management? no [Does patient follow with other health care specialists for weight management?] : Does patient follow with other health care specialists for weight management? no [Patient will lose 0.5 to 1 lb per week] : Patient will lose 0.5 to 1lb per week [Patient will lose inches off waist] : patient will lose inches off waist [Patient will weigh self daily] : patient will weigh self daily [Monitoring of weight at home and at cardiac rehabilitation] : Monitoring of weight at home and at cardiac rehabilitation [Calories and healthy weight management] : Calories and healthy weight management [Exercise and diet] : exercise and diet [Weight gain and heart failure implications] : weight gain and heart failure implications [Yes, continue with Weight Management plan] : Yes, continue with weight management plan [Motivation/Desire] : motivation/desire [PCI/Stent] : PCI/stent [Age] : age [HLD] : HLD [Hypertension] : hypertension [Diabetes Mellitus] : diabetes mellitus [Sedentary] : sedentary [Overweight/Obesity] : overweight/obesity [Heart Failure] : heart failure [Angina with exercise] : no angina with exercise [] : no [Treadmill] : treadmill [Recumbent bike] : recumbent bike [Upright exercise bike] : upright exercise bike [BioStep] : BioStep [Elliptical] : elliptical [Upper body ergometer] : upper body ergometer [Stair Climber] : stair climber [Walking] : walking [Perform aerobic activity for ___ consecutive minutes] : perform aerobic activity for [unfilled] consecutive minutes [Individualized Exercise Rx] : individualized exercise Rx [Home exercise] : home exercise [Teach back utilized] : teach back utilized [Yes, per exercise prescription, policy] : Yes, per exercise prescription, policy [FreeTextEntry1] : Dr. Kay [FreeTextEntry5] : Alina Hernandez [FreeTextEntry7] : ADLS [de-identified] : Intake: Patient verbalized good understanding of the importance of incorporating exercise into daily routine. Patient admits that he is more sedentary than usual due to the weather being cold and not getting out of the house. Patient is looking forward to warm weather and will begin walking outside in the coming weeks for exercise.  [Breathing exercises] : breathing exercises [Discuss overview of emotional health supportive modalities] : Discuss overview of emotional health supportive modalities [Stress management] : stress management [Yes, continue with psychosocial plan] : Yes, continue with psychosocial plan [FreeTextEntry9] : Intake: Admits to feeling "down in the dumps" regarding recent cardiac diagnoses. Patient given resources for behavioral health and plans to reach out and find a therapist to speak with. Patient lives at home with his daughter Sonia.  [Assessment] : Assessment [Action] : Action [Is patient on medication for hyperlipidemia?] : Is patient on medication for hyperlipidemia? Yes [Is Patient adherent with medication?] : patient is adherent with medication [Family] : family [Patient has seen registered dietitian in the past?] : Patient has seen registered dietitian in the past? Yes [Patient is interested in seeing a registered dietitian?] : Patient is interested in seeing a registered dietitian? No [Patient will include healthy diet pattern approaches to healthy eating] : Patient will include healthy diet pattern approaches to healthy eating [Discuss an overview of healthy eating plan] : Discuss an overview of healthy eating plan [Yes, continue with nutrition plan] : Yes, continue with nutrition plan [In progress] : in progress [FreeTextEntry6] : rice [FreeTextEntry8] : Intake: Patient has made drastic dietary changes since his most recent PCI. Patient verbalizes excellent understanding of heart healthy diet. Has transitioned from white rice to brown rice and incorporates vegetables into each meal.

## 2024-04-09 NOTE — REASON FOR VISIT
[Home] : at home, [unfilled] , at the time of the visit. [Medical Office: ([facility])___] : at the medical office located in  [Family Member] : family member [Verbal consent obtained from patient] : the patient, [unfilled] [Intake Visit] : an intake visit [Assessment] : an assessment [FreeTextEntry1] : ITP to be finalized, reviewed and manually signed by Dr. Sheehan prior to session 1; Clinical indication for cardiac rehabilitation: PCI

## 2024-04-09 NOTE — PLAN
[FreeTextEntry1] : Cardiac Rehabilitation Phase 2 Focus assessment and physical exam at session #1 ITP initiated- confer with Dr. Kay  All questions answered.   Start date: 8/21/24 in the 1:30 PM session Will call patient if there becomes an opportunity to start sooner given session availability. Patient plans to reach out to  behavioral health contact provided for emotional support with his depression.

## 2024-04-09 NOTE — REVIEW OF SYSTEMS
[Fatigue] : fatigue [Recent Change In Weight] : ~T recent weight change [Vision Problems] : vision problems [Chest Pain] : no chest pain [Palpitations] : no palpitations [Claudication] : no  leg claudication [Lower Ext Edema] : lower extremity edema [Shortness Of Breath] : no shortness of breath [Dyspnea on Exertion] : not dyspnea on exertion [Abdominal Pain] : no abdominal pain [Vomiting] : no vomiting [Dysuria] : no dysuria [Joint Pain] : no joint pain [Joint Stiffness] : no joint stiffness [Back Pain] : no back pain [Joint Swelling] : no joint swelling [Skin Rash] : no skin rash [Dizziness] : no dizziness [Fainting] : no fainting [Unsteady Walk] : no ataxia [Anxiety] : no anxiety [Depression] : depression [FreeTextEntry2] : slow, steady weight gain over the past several years [FreeTextEntry3] : diabetic retinopathy  [FreeTextEntry5] : occasional LE edema

## 2024-05-16 PROBLEM — I25.10 ARTERIOSCLEROTIC CARDIOVASCULAR DISEASE (ASCVD): Status: ACTIVE | Noted: 2021-12-28

## 2024-05-17 ENCOUNTER — APPOINTMENT (OUTPATIENT)
Dept: CARDIOLOGY | Facility: CLINIC | Age: 59
End: 2024-05-17

## 2024-05-17 DIAGNOSIS — I25.10 ATHEROSCLEROTIC HEART DISEASE OF NATIVE CORONARY ARTERY W/OUT ANGINA PECTORIS: ICD-10-CM

## 2024-05-21 NOTE — H&P ADULT - NSHPLABSRESULTS_GEN_ALL_CORE
Yes
LABS:                           15.5   14.88 )-----------( 225      ( 22 Dec 2021 08:35 )             47.9     12-22    138  |  104  |  14  ----------------------------<  210<H>  4.0   |  25  |  1.42<H>    Ca    9.7      22 Dec 2021 08:35    TPro  7.8  /  Alb  3.3<L>  /  TBili  0.5  /  DBili  x   /  AST  77<H>  /  ALT  29  /  AlkPhos  98  12-22        PT/INR - ( 22 Dec 2021 08:35 )   PT: 13.9 sec;   INR: 1.18 ratio         PTT - ( 22 Dec 2021 08:35 )  PTT:41.3 sec          LIVER FUNCTIONS - ( 22 Dec 2021 08:35 )  Alb: 3.3 g/dL / Pro: 7.8 g/dL / ALK PHOS: 98 U/L / ALT: 29 U/L DA / AST: 77 U/L / GGT: x                 I&O's Summary         /     CAPILLARY BLOOD GLUCOSE      POCT Blood Glucose.: 185 mg/dL (22 Dec 2021 12:35)            MICRO:

## 2024-05-21 NOTE — PROGRESS NOTE ADULT - PROBLEM SELECTOR PROBLEM 4
Caller: Ying Mathew    
Hypertension
Hypertension

## 2024-06-04 ENCOUNTER — APPOINTMENT (OUTPATIENT)
Dept: CARDIOLOGY | Facility: CLINIC | Age: 59
End: 2024-06-04
Payer: COMMERCIAL

## 2024-06-04 ENCOUNTER — NON-APPOINTMENT (OUTPATIENT)
Age: 59
End: 2024-06-04

## 2024-06-04 VITALS
HEIGHT: 71 IN | WEIGHT: 213 LBS | BODY MASS INDEX: 29.82 KG/M2 | DIASTOLIC BLOOD PRESSURE: 78 MMHG | HEART RATE: 86 BPM | SYSTOLIC BLOOD PRESSURE: 123 MMHG | OXYGEN SATURATION: 96 %

## 2024-06-04 PROCEDURE — 93000 ELECTROCARDIOGRAM COMPLETE: CPT

## 2024-06-04 PROCEDURE — 99215 OFFICE O/P EST HI 40 MIN: CPT

## 2024-06-04 PROCEDURE — G2211 COMPLEX E/M VISIT ADD ON: CPT | Mod: NC,1L

## 2024-06-04 RX ORDER — FUROSEMIDE 20 MG/1
20 TABLET ORAL
Qty: 36 | Refills: 3 | Status: ACTIVE | COMMUNITY
Start: 2024-06-04 | End: 1900-01-01

## 2024-06-04 RX ORDER — ROSUVASTATIN CALCIUM 10 MG/1
10 TABLET, FILM COATED ORAL
Qty: 90 | Refills: 3 | Status: ACTIVE | COMMUNITY
Start: 2021-12-28 | End: 1900-01-01

## 2024-06-04 RX ORDER — ASPIRIN 81 MG/1
81 TABLET, DELAYED RELEASE ORAL DAILY
Qty: 90 | Refills: 1 | Status: ACTIVE | COMMUNITY
Start: 2021-12-28 | End: 1900-01-01

## 2024-06-04 RX ORDER — CARVEDILOL 6.25 MG/1
6.25 TABLET, FILM COATED ORAL TWICE DAILY
Qty: 60 | Refills: 5 | Status: ACTIVE | COMMUNITY
Start: 2023-12-14 | End: 1900-01-01

## 2024-06-18 NOTE — ASU PATIENT PROFILE, ADULT - FALL HARM RISK - PT AGE POPULATION HIDDEN
[Work related] : work related [Dull/Aching] : dull/aching [Sharp] : sharp [Not working due to injury] : Work status: not working due to injury Adult [de-identified] : WC DOI: 6/4/24 06/18/2024: fell at work 4 days ago w/ pain. went to [facility] who sent for mri. using crutches. no prior sig foot probs. denies dm/tob. VITO -- out since injury [] : Post Surgical Visit: no [FreeTextEntry1] : L FOOT  [FreeTextEntry3] : 6/14/24 [de-identified] : VITO

## 2024-06-25 ENCOUNTER — APPOINTMENT (OUTPATIENT)
Dept: ENDOCRINOLOGY | Facility: CLINIC | Age: 59
End: 2024-06-25
Payer: COMMERCIAL

## 2024-06-25 DIAGNOSIS — E78.5 HYPERLIPIDEMIA, UNSPECIFIED: ICD-10-CM

## 2024-06-25 DIAGNOSIS — E11.65 TYPE 2 DIABETES MELLITUS WITH OTHER DIABETIC OPHTHALMIC COMPLICATION: ICD-10-CM

## 2024-06-25 DIAGNOSIS — E66.9 OBESITY, UNSPECIFIED: ICD-10-CM

## 2024-06-25 DIAGNOSIS — E11.39 TYPE 2 DIABETES MELLITUS WITH OTHER DIABETIC OPHTHALMIC COMPLICATION: ICD-10-CM

## 2024-06-25 DIAGNOSIS — I10 ESSENTIAL (PRIMARY) HYPERTENSION: ICD-10-CM

## 2024-06-25 PROCEDURE — 99214 OFFICE O/P EST MOD 30 MIN: CPT

## 2024-06-25 RX ORDER — INSULIN GLARGINE 100 [IU]/ML
100 INJECTION, SOLUTION SUBCUTANEOUS
Qty: 2 | Refills: 0 | Status: ACTIVE | COMMUNITY
Start: 2023-06-22 | End: 1900-01-01

## 2024-06-25 RX ORDER — INSULIN LISPRO 100 [IU]/ML
100 INJECTION, SOLUTION INTRAVENOUS; SUBCUTANEOUS
Qty: 3 | Refills: 1 | Status: ACTIVE | COMMUNITY
Start: 2024-01-05 | End: 1900-01-01

## 2024-06-25 RX ORDER — PEN NEEDLE, DIABETIC 29 G X1/2"
31G X 5 MM NEEDLE, DISPOSABLE MISCELLANEOUS
Qty: 4 | Refills: 1 | Status: ACTIVE | COMMUNITY
Start: 2022-12-09 | End: 1900-01-01

## 2024-06-25 RX ORDER — TIRZEPATIDE 2.5 MG/.5ML
2.5 INJECTION, SOLUTION SUBCUTANEOUS
Qty: 1 | Refills: 1 | Status: ACTIVE | COMMUNITY
Start: 2023-04-27 | End: 1900-01-01

## 2024-07-15 ENCOUNTER — APPOINTMENT (OUTPATIENT)
Dept: CARDIOLOGY | Facility: CLINIC | Age: 59
End: 2024-07-15

## 2024-07-15 DIAGNOSIS — I50.20 UNSPECIFIED SYSTOLIC (CONGESTIVE) HEART FAILURE: ICD-10-CM

## 2024-07-15 DIAGNOSIS — I25.10 ATHEROSCLEROTIC HEART DISEASE OF NATIVE CORONARY ARTERY W/OUT ANGINA PECTORIS: ICD-10-CM

## 2024-07-24 ENCOUNTER — RESULT REVIEW (OUTPATIENT)
Age: 59
End: 2024-07-24

## 2024-07-24 ENCOUNTER — APPOINTMENT (OUTPATIENT)
Dept: CV DIAGNOSITCS | Facility: HOSPITAL | Age: 59
End: 2024-07-24

## 2024-07-29 ENCOUNTER — RX RENEWAL (OUTPATIENT)
Age: 59
End: 2024-07-29

## 2024-08-07 ENCOUNTER — APPOINTMENT (OUTPATIENT)
Dept: CARDIOLOGY | Facility: CLINIC | Age: 59
End: 2024-08-07

## 2024-08-07 PROCEDURE — G2211 COMPLEX E/M VISIT ADD ON: CPT | Mod: NC

## 2024-08-07 PROCEDURE — 93798 PHYS/QHP OP CAR RHAB W/ECG: CPT

## 2024-08-07 PROCEDURE — 99215 OFFICE O/P EST HI 40 MIN: CPT | Mod: 25

## 2024-08-07 NOTE — PLAN
[FreeTextEntry1] : Cardiac Rehabilitation Phase 2 Focus assessment and physical exam at session #1 ITP initiated- confer with Dr. Kay  All questions answered.   Start date: 8/21/24 in the 1:30 PM session Provided again with information for [facility] resources given that patient feels he is depressed. I gave them to him at time of intake, however he has not yet reached out. PHQ 5, no concerning psychiatric symptoms, denies si/hi/ah/vh. Patient plans to reach out to [facility] this week for emotional support with his depression.

## 2024-08-07 NOTE — HISTORY OF PRESENT ILLNESS
[FreeTextEntry1] : Mr. Iglesias is a 58 year old male s/p PCI to the RPL with Dr. Dean at [facility] on 3/11/24. He presents today via telephone for cardiac rehabilitation initial intake assessment with his daughter Sonia. He reports feeling well and denies focal complaints at time of telephone call. Mr. Iglesias was feeling well until early January when he presented to his cardiologists office c/o several months of persistent exertional left sided chest pain with associated dyspnea. His nuclear stress test was abnormal and he was subsequently referred for C and had I ANA to the RPL on 3/11/24. He has other pertinent cardiac history of: HFrEF (42% via last echo, followed by Dr. Allen), MI in 2021 with ANA to pLAD, had additional ANA to pRCA in 2022 and to OM2 in October of 2023, HTN, HLD, DM II and venous insufficiency. Upon interview, patient admitted to feeling "down in the dumps" regarding his recent cardiac diagnoses. Denied any SI/HI/AH/VH, [facility] behavioral [facility] resources were provided via telephone to patient and daughter. I also advised patient to reach out to his primary care provider Dr. Fuller to discuss his emotional health. Mr. Iglesias lives at home with his daughter and is currently not working. He has made drastic changes to his diet since most recent PCI and his a1c has improved. He is eager to begin cardiac rehabilitation and has embraced a heart healthy lifestyle. He admits to being rather sedentary over the past few months and will start walking outside for exercise prior to coming to cardiac rehabilitation.   Session #1: 8/7/24: Patient presents today for exercise session #1. He reports feeling well and is without focal complaints at time of visit. Since intake assessment, patient denies any hospitalizations/ED visits/major changes in health status. He admits that he has been sedentary and has not done any exercise, mostly sits on the couch and watches television. Patient tolerated seated modalities well, was able to complete both bouts of activity without difficulty. He was oriented extensively to CR program facility, policies and procedures. He was given education re: exercise safety, the importance of consistency with exercise attendance, proper equipment use and s/sx that must be reported to CR staff in real time. He demonstrated excellent understanding of all instructions. He is eager to proceed with a formal exercise program.   [Fully functional (bathing, dressing, toileting, transferring, walking, feeding)] : Fully functional (bathing, dressing, toileting, transferring, walking, feeding)

## 2024-08-07 NOTE — REVIEW OF SYSTEMS
[Fatigue] : fatigue [Recent Change In Weight] : ~T recent weight change [Vision Problems] : vision problems [Chest Pain] : no chest pain [Palpitations] : no palpitations [Claudication] : no  leg claudication [Lower Ext Edema] : lower extremity edema [Shortness Of Breath] : no shortness of breath [Dyspnea on Exertion] : not dyspnea on exertion [Abdominal Pain] : no abdominal pain [Vomiting] : no vomiting [Dysuria] : no dysuria [Joint Pain] : no joint pain [Joint Stiffness] : no joint stiffness [Back Pain] : no back pain [Joint Swelling] : no joint swelling [Skin Rash] : no skin rash [Dizziness] : no dizziness [Fainting] : no fainting [Unsteady Walk] : no ataxia [Suicidal] : not suicidal [Insomnia] : no insomnia [Anxiety] : no anxiety [Depression] : depression [FreeTextEntry2] : slow, steady weight gain over the past several years [FreeTextEntry3] : diabetic retinopathy  [FreeTextEntry5] : occasional LE edema  [de-identified] : given [facility] behavioral health resources and encouraged patient to reach out to enroll in talk therapy/be evaluiated by a psychiatrist.

## 2024-08-07 NOTE — REASON FOR VISIT
[Home] : at home, [unfilled] , at the time of the visit. [Medical Office: ([facility])___] : at the medical office located in  [Family Member] : family member [Verbal consent obtained from patient] : the patient, [unfilled]

## 2024-08-08 ENCOUNTER — APPOINTMENT (OUTPATIENT)
Dept: ENDOCRINOLOGY | Facility: CLINIC | Age: 59
End: 2024-08-08

## 2024-08-09 ENCOUNTER — RX RENEWAL (OUTPATIENT)
Age: 59
End: 2024-08-09

## 2024-08-12 ENCOUNTER — APPOINTMENT (OUTPATIENT)
Dept: CARDIOLOGY | Facility: CLINIC | Age: 59
End: 2024-08-12
Payer: COMMERCIAL

## 2024-08-12 PROCEDURE — 93798 PHYS/QHP OP CAR RHAB W/ECG: CPT

## 2024-08-14 ENCOUNTER — APPOINTMENT (OUTPATIENT)
Dept: CARDIOLOGY | Facility: CLINIC | Age: 59
End: 2024-08-14
Payer: COMMERCIAL

## 2024-08-14 PROCEDURE — 93797 PHYS/QHP OP CAR RHAB WO ECG: CPT

## 2024-08-15 ENCOUNTER — APPOINTMENT (OUTPATIENT)
Dept: CARDIOLOGY | Facility: CLINIC | Age: 59
End: 2024-08-15
Payer: COMMERCIAL

## 2024-08-15 PROCEDURE — 93797 PHYS/QHP OP CAR RHAB WO ECG: CPT

## 2024-08-19 ENCOUNTER — APPOINTMENT (OUTPATIENT)
Dept: CARDIOLOGY | Facility: CLINIC | Age: 59
End: 2024-08-19
Payer: COMMERCIAL

## 2024-08-19 PROCEDURE — 93797 PHYS/QHP OP CAR RHAB WO ECG: CPT

## 2024-08-20 ENCOUNTER — NON-APPOINTMENT (OUTPATIENT)
Age: 59
End: 2024-08-20

## 2024-08-21 ENCOUNTER — APPOINTMENT (OUTPATIENT)
Dept: CARDIOLOGY | Facility: CLINIC | Age: 59
End: 2024-08-21
Payer: COMMERCIAL

## 2024-08-21 ENCOUNTER — APPOINTMENT (OUTPATIENT)
Dept: HEART FAILURE | Facility: CLINIC | Age: 59
End: 2024-08-21
Payer: COMMERCIAL

## 2024-08-21 VITALS
BODY MASS INDEX: 31.5 KG/M2 | TEMPERATURE: 98.6 F | OXYGEN SATURATION: 95 % | DIASTOLIC BLOOD PRESSURE: 85 MMHG | HEART RATE: 82 BPM | SYSTOLIC BLOOD PRESSURE: 138 MMHG | WEIGHT: 225 LBS | HEIGHT: 71 IN

## 2024-08-21 PROCEDURE — 99215 OFFICE O/P EST HI 40 MIN: CPT

## 2024-08-21 PROCEDURE — 93000 ELECTROCARDIOGRAM COMPLETE: CPT

## 2024-08-21 PROCEDURE — 93797 PHYS/QHP OP CAR RHAB WO ECG: CPT

## 2024-08-21 NOTE — HISTORY OF PRESENT ILLNESS
[FreeTextEntry1] : Mr. Iglesias 59M HTN, HLD, IDDM (A1C 9.4%), JOE, CAD s/p MI (PCI pLAD 12/2021, PCI mRCA 3/2022, PCI to OM2 10/23), ICM HFrecEF (LVEF 55%, LVIdd 4.7 7/2024, from 30-35% 12/2021) who presents today for HF follow up. He was initially referred by his cardiologist, Dr. Stephen Kay.

## 2024-08-21 NOTE — DISCUSSION/SUMMARY
[FreeTextEntry1] : referred by Dr Zavaleta.  Last seen March 2024.  58yrs, HTN, lipids,IDDM Dec 2021- CP. PCI Prox LAD. LVEF 30-35 at that time March 2022 PCI RCA. TTE March 2022; LVEF 40, LVEDd 4.6, LA 3.4, normal wall thick. RV normal. no MR. min TR. RVSP 26  [facility] Oct 2023: PCI OM1 95 RCA LAD stents patent  Driven by ongoing CP.  [facility] March 2024 for abn stress: Severe RPL disease s/p ANA. Stents to LAD and OM1 patent.  TTE July 2024: LVEF 55, RWMA. LVEDD 4.7, normal walls. RV normal. TAPSe 1.7 mod LA+ May 2023. ? anaphylaxis/ angioedema to losartan after being on it for a year. seen by [facility] allergist.  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  meds: coreg 12.5 bid (limited by dizzyness), [id number] , lasix 20 QOD. asa, brelinta, crestor, insulin, GLP1,  praluent.  Just started cardiac rehab. breathing very good. "best in 25 yrs". has been gaining weight.  138/85, 82 225 (213) euvolemic no recent labs. A1V 9.0 in May. Cr 1.7  Stressed to patient the importance of prevention. Patient will discuss with Dr Zavaleta and needs close f/u with endocrinologist.  Will refer to [facility] nephrologist  Franko Allen  40 min

## 2024-08-21 NOTE — CARDIOLOGY SUMMARY
[de-identified] :  1/11/24: NM Stress, post gated LVEF  60%, post EDV 97ml and systolic 39ml, There is a severe, medium sized defect in the distal-apical anterior wall that partially reverses, consistent with infarct with deborah-infarct ischemia. There is also a partially reversible defect in the inferior wall that corrects with prone imaging, suggestive of attenuation artifact.   3/16/23 barbara 7 min 8 METS. target HR achieved. NO CP. EKG changes. LVEF 60 post stress. Hypo ant/apic/sept. medium fixed defects ant.   [de-identified] : 7/24/24 TTE: LVEF 55%, RWMA, LVIDd 4.7cm, alls normal, normal LV filling pressures, normal RV size and funtion, TAPSE 1.7, mod LA dilation, normal RA, no valvular disease  12/6/23 TTE: LVIDd 4.6, LVEF 42%, Regional WMA, grade I DD, normal RVSF, normal BiAtrium, trace AR, trace MR, trace TR  TTE March 2022; LVEF 40, LVEDd 4.6, LA 3.4, normal wall thick. RV normal. no MR. min TR. RVSP 26   [de-identified] :  3/11/24 [facility]: mid LAD 30% prior stent patent, LAD 1st Diag 100%, Cx 40%, RCA 90%, s/p ANA x1 RPL  10/12/23 [facility]: 30% LAD, 95% circ, s/p ANA to OM2, patent RCA and LAD stents

## 2024-08-22 ENCOUNTER — APPOINTMENT (OUTPATIENT)
Dept: CARDIOLOGY | Facility: CLINIC | Age: 59
End: 2024-08-22
Payer: COMMERCIAL

## 2024-08-22 PROCEDURE — 93797 PHYS/QHP OP CAR RHAB WO ECG: CPT

## 2024-08-29 ENCOUNTER — APPOINTMENT (OUTPATIENT)
Dept: CARDIOLOGY | Facility: CLINIC | Age: 59
End: 2024-08-29

## 2024-08-29 DIAGNOSIS — I25.10 ATHEROSCLEROTIC HEART DISEASE OF NATIVE CORONARY ARTERY W/OUT ANGINA PECTORIS: ICD-10-CM

## 2024-08-29 PROCEDURE — 96372 THER/PROPH/DIAG INJ SC/IM: CPT

## 2024-08-29 PROCEDURE — G2211 COMPLEX E/M VISIT ADD ON: CPT | Mod: NC

## 2024-08-29 PROCEDURE — 99214 OFFICE O/P EST MOD 30 MIN: CPT | Mod: 25

## 2024-08-29 RX ORDER — INCLISIRAN 284 MG/1.5ML
284 INJECTION, SOLUTION SUBCUTANEOUS
Refills: 0 | Status: COMPLETED | OUTPATIENT
Start: 2024-08-29

## 2024-08-29 RX ADMIN — INCLISIRAN 284 MG/1.5ML: 284 INJECTION, SOLUTION SUBCUTANEOUS at 00:00

## 2024-08-30 ENCOUNTER — APPOINTMENT (OUTPATIENT)
Dept: ENDOCRINOLOGY | Facility: CLINIC | Age: 59
End: 2024-08-30
Payer: COMMERCIAL

## 2024-08-30 VITALS
DIASTOLIC BLOOD PRESSURE: 80 MMHG | WEIGHT: 225 LBS | HEIGHT: 71 IN | HEART RATE: 78 BPM | SYSTOLIC BLOOD PRESSURE: 128 MMHG | BODY MASS INDEX: 31.5 KG/M2 | OXYGEN SATURATION: 98 %

## 2024-08-30 DIAGNOSIS — I10 ESSENTIAL (PRIMARY) HYPERTENSION: ICD-10-CM

## 2024-08-30 DIAGNOSIS — E11.39 TYPE 2 DIABETES MELLITUS WITH OTHER DIABETIC OPHTHALMIC COMPLICATION: ICD-10-CM

## 2024-08-30 DIAGNOSIS — E11.65 TYPE 2 DIABETES MELLITUS WITH OTHER DIABETIC OPHTHALMIC COMPLICATION: ICD-10-CM

## 2024-08-30 DIAGNOSIS — E66.9 OBESITY, UNSPECIFIED: ICD-10-CM

## 2024-08-30 PROCEDURE — 99214 OFFICE O/P EST MOD 30 MIN: CPT

## 2024-08-30 PROCEDURE — 95251 CONT GLUC MNTR ANALYSIS I&R: CPT

## 2024-08-30 NOTE — HISTORY OF PRESENT ILLNESS
[FreeTextEntry1] : Patient is a 59 M with history of HTN, HL,T2DM, CAD S/p MI in [facility] 12/2021, s/p PCI>pLAD (residual pRCA 70% and Diag 70%), s/p PCI mRCA in , TTE-, EF 30-35% HFrEF here for follow up. Patient is accompanied by his daughter. Last visit 06/2024  FH: mother and siblings have diabetes SH: smokes weed, not cigarettes, does not drink alcohol  Retired , lives with his wife who does most of the cooking  # T2DM: Diabetes history: Diagnosed when he was 25 years old. Diagnosed with T2D when he went to his PCP, had blurry vision, polyuria, polydipsia. Has been on insulin for 30 years.   Most recent A1C 11.5 09/2022-->10.2 12/9/2022 --> 8.8 06/22/2023--> 7.7 12/8/2023 --> 9.4% per patient (6/2024- they will send us copy of outside labs)-->   Diabetes complications: Neuropathy: occasional numbness in finger tips Retinopathy:  proliferative retinopathy in both eyes, seeing optho regularly every 2 weeks, got injection and laser in both eyes  Nephropathy:  09/2022--> 469 08/2024. Reports that PCP referred patient to see nephrology on 7/9/2024 based on recent outside labs. Cr 1.68, GFR 47 CAD/MI/CVA: yes had MI in 12/2021  Current DM medications:  - Basaglar 30 units QHS - lispro 30 units before brunch and dinner  - Farxiga 10 mg daily (was off farxiga for a few months due to dizziness, restarted 2 weeks ago when he was told A1c was elevated) - Mounjaro 2.5 mg weekly   Past DM medications:  - Told to stop Metformin because of fatty liver by his PCP 20 years ago, never had GI issue with metformin  - Previously on lantus and admelog - Jardiance stopped?  Finger sticks: CGM interpretation for dates: 08/17/2024-08/30/2024 Nova 3 TIR 29%  High 49% Very high 22% Low 0% Very low 0% Glucose variabiltiy 24.9% GMI 8.4% Average glucose: 211 CGM active 99% Pattern: fasting hyperglycemia, no hypoglycemia. Postprandial hyperglycemia with lunch and dinner   Diet:  Wife is cooking now but patient feels that when daughter is around, he usually has better diet  brunch (1pm)- oatmeal with berries, or egg white with toast snacks- nuts, protein bar, sugar free jello, sugar free chocolate, once in a while ww bread dinner (7-9pm)- steamed veggies with salmon, a fisful brown rice snack- sugar free pudding, sometimes regular chocolate  Drinks: diet pepsi, water, coffee with no sugar and almond milk, no juice Exercise: no exercise. used to exercise lifting weight before MI but now has pain when he does that   # HTN - /80 On carvedilol 6.25 mg BID. Reports he is off Losartan 25 mg daily and Metoprolol 25 mg BID.  # HLD - LDL 90, , HDL 80,  05/2024 Outside lab  - On rosuvastatin 10 mg daily  - Had started PCSK9 inhibitor by cardiology   #CAD - On aspirin and brilinta  - Denies chest pain or palpitations  # Hypothyroidism  - TSH 8.84  05/2024 - Patient started on levothyroxine 100 mcg daily in May   Also taking vit D supplement.  9/2/2022 visit events: after having MI, feels unmotivated to do anything. Stopped cooking his meals and stopped exercise. Now wishes to get back on track.   12/9/2022 visit events: eats dinner around 8-9 pm every day. Patient lost a little weight since last visit. He says for [employer] and world cup, he has been snacking a lot mostly on chocolates, cakes and other junk food. Switched to humulin because he feels like basaglar is not working for him.   3/3/2023 visit events: overall feeling better. Lost about 5 pounds since last time. Feels like basaglar does not work for him because he wakes up with fasting glucose in the 200s. He usually eats a very late dinner around 10 pm (and dinner is a decent size meal). Occasional would eat a cake for snack at bed time. Lunch is usually small and is around 3-4 pm.   6/22/2023 visit events: feels like basaglar is not working, so stopped taking basaglar.   12/8/2023 visit events: has not been taking basaglar for the past 3 months. has morning hyperglycemia. Tolerating mounjaro but has fatigue for the 2-3 days after the injection   6/25/24 visit events: Had elevated A1c on recent PCP labs. Since then, has been changing diet and resumed Farxiga. Interested in resuming Mounjaro at lowest dose since he has been off Mounjaro for 3 months.  8/30/2024 visit events: here with his daughter. He endorses feeling well. Now restarted back on mounjaro 2.5 mg weekly tolerating well. Gaining some weight. States that basaglar does not work for him.

## 2024-08-30 NOTE — ASSESSMENT
[FreeTextEntry1] : Patient is a 59 M with history of HTN, HL,T2DM, CAD S/p MI in [facility] 12/2021, s/p PCI>pLAD (residual pRCA 70% and Diag 70%), s/p PCI mRCA in , TTE-, EF 30-35% HFrEF here for follow up.   # T2D complicated by CAD, retinopathy - HgB A1C: 9.4% 05/2024, repeat today  - Complications: CAD, retinopathy - Aspirin: yes - Most recent urine microalbumin 156 09/2022, repeat today . ACE-I/ARB: no (used to be on losartan) - Most recent LDL: 91. On statin: yes rosuvastatin 10 mg daily, cardiology planning for additional therapy - Opthalmology up to date: yes has retinopathy, advised for yearly check up - Podiatry up to date: no advised for yearly check up - Patient to call for persistent glucose < 70 or > 300 - Patient counseled on the importance of consistent carbohydrate diet and regular physical activity - Advised patient to continue checking FSG and to bring meter to all visits - Symptoms of hypoglycemia discussed - Medications changes: - Overall glucose still uncontrolled with only 29% TIR  - Fasting hyperglycemia, increase Basaglar to 40 units qhs - Postprandial hyperglycemia, increase lispro to 40 units before meals, will add premeal SS 1:50 > 150. Advised patient to avoid taking lispro 2 hours before meals, should be 15 minutes before meals. - Increase Mounjaro to 5 mg weekly (slowly titrating depending on tolerance) - Continue Farxiga 10 mg daily, also due to CHF and microalbuminuria  - Encourage the continued use of CGM - Discussed the possibility of omnipod instead of basal insulin as patient historically did not have a good response to long acting insulin. Patient is agreeable. Will set him up with CDE to initiate the process.   # HTN - BP today 128/80 - On carvedilol 6.25 mg BID. Reports he is off Losartan 25 mg daily and Metoprolol 25 mg BID.  #  HLD - Most recent LDL 91, goal should be <50-70 in the setting of history of MI - Continue with rosuvastatin 10 mg qd, does have history of statin intolerance - Managing by Dr. Kay from cardiology   # class 1 obesity  - Mild weight loss from low dose Trulicity - Thus switched to Mounjaro, titrate up dose as above - Peak weight 225 was 211 lb now back to 225 lb   Patient will email recent labs to [email or web address]. FOLLOW UP: I recommend that next visit for diabetes care be in 3 month with Princess and 6 months with me   To do at next clinic visit - Titrate insulin based on CGM. Current insulin requirement is quite high, may consider U500 soon if >200 TDD of insulin - GLP1 agonist titration- slow titration  Brielle Brown MD Endocrinology, Diabetes and Metabolism [address] [place] [address]. [address] [place] [address] Tel [phone] Fax [phone]

## 2024-09-03 LAB
ALBUMIN SERPL ELPH-MCNC: 4.6 G/DL
ALP BLD-CCNC: 109 U/L
ALT SERPL-CCNC: 22 U/L
ANION GAP SERPL CALC-SCNC: 14 MMOL/L
AST SERPL-CCNC: 18 U/L
BILIRUB SERPL-MCNC: 0.5 MG/DL
BUN SERPL-MCNC: 16 MG/DL
C PEPTIDE SERPL-MCNC: 1.4 NG/ML
CALCIUM SERPL-MCNC: 9.9 MG/DL
CHLORIDE SERPL-SCNC: 104 MMOL/L
CHOLEST SERPL-MCNC: 170 MG/DL
CO2 SERPL-SCNC: 25 MMOL/L
CREAT SERPL-MCNC: 1.18 MG/DL
CREAT SPEC-SCNC: 207 MG/DL
EGFR: 72 ML/MIN/1.73M2
ESTIMATED AVERAGE GLUCOSE: 220 MG/DL
GLUCOSE SERPL-MCNC: 175 MG/DL
HBA1C MFR BLD HPLC: 9.3 %
HDLC SERPL-MCNC: 78 MG/DL
LDLC SERPL CALC-MCNC: 77 MG/DL
MICROALBUMIN 24H UR DL<=1MG/L-MCNC: 61.1 MG/DL
MICROALBUMIN/CREAT 24H UR-RTO: 295 MG/G
NONHDLC SERPL-MCNC: 92 MG/DL
POTASSIUM SERPL-SCNC: 5.7 MMOL/L
PROT SERPL-MCNC: 7.1 G/DL
SODIUM SERPL-SCNC: 143 MMOL/L
T4 FREE SERPL-MCNC: 1.7 NG/DL
THYROPEROXIDASE AB SERPL IA-ACNC: 1784 IU/ML
TRIGL SERPL-MCNC: 82 MG/DL
TSH SERPL-ACNC: 1.08 UIU/ML

## 2024-09-03 RX ORDER — INSULIN PMP CART,AUT,G6/7,CNTR
EACH SUBCUTANEOUS
Qty: 1 | Refills: 0 | Status: ACTIVE | COMMUNITY
Start: 2024-09-03 | End: 1900-01-01

## 2024-09-03 RX ORDER — BLOOD-GLUCOSE SENSOR
EACH MISCELLANEOUS
Qty: 9 | Refills: 3 | Status: ACTIVE | COMMUNITY
Start: 2024-09-03 | End: 1900-01-01

## 2024-09-04 ENCOUNTER — APPOINTMENT (OUTPATIENT)
Dept: CARDIOLOGY | Facility: CLINIC | Age: 59
End: 2024-09-04
Payer: COMMERCIAL

## 2024-09-04 PROCEDURE — 93797 PHYS/QHP OP CAR RHAB WO ECG: CPT

## 2024-09-05 ENCOUNTER — APPOINTMENT (OUTPATIENT)
Dept: CARDIOLOGY | Facility: CLINIC | Age: 59
End: 2024-09-05

## 2024-09-05 VITALS
DIASTOLIC BLOOD PRESSURE: 74 MMHG | HEIGHT: 71 IN | HEART RATE: 78 BPM | SYSTOLIC BLOOD PRESSURE: 120 MMHG | WEIGHT: 231 LBS | BODY MASS INDEX: 32.34 KG/M2

## 2024-09-05 DIAGNOSIS — Z95.5 PRESENCE OF CORONARY ANGIOPLASTY IMPLANT AND GRAFT: ICD-10-CM

## 2024-09-05 DIAGNOSIS — I50.20 UNSPECIFIED SYSTOLIC (CONGESTIVE) HEART FAILURE: ICD-10-CM

## 2024-09-05 DIAGNOSIS — E78.5 HYPERLIPIDEMIA, UNSPECIFIED: ICD-10-CM

## 2024-09-05 PROCEDURE — G2211 COMPLEX E/M VISIT ADD ON: CPT | Mod: NC

## 2024-09-05 PROCEDURE — 99215 OFFICE O/P EST HI 40 MIN: CPT | Mod: 25

## 2024-09-05 PROCEDURE — 93797 PHYS/QHP OP CAR RHAB WO ECG: CPT

## 2024-09-06 ENCOUNTER — NON-APPOINTMENT (OUTPATIENT)
Age: 59
End: 2024-09-06

## 2024-09-09 ENCOUNTER — APPOINTMENT (OUTPATIENT)
Dept: CARDIOLOGY | Facility: CLINIC | Age: 59
End: 2024-09-09
Payer: COMMERCIAL

## 2024-09-09 PROCEDURE — 93797 PHYS/QHP OP CAR RHAB WO ECG: CPT

## 2024-09-11 ENCOUNTER — APPOINTMENT (OUTPATIENT)
Dept: CARDIOLOGY | Facility: CLINIC | Age: 59
End: 2024-09-11
Payer: COMMERCIAL

## 2024-09-11 PROCEDURE — 93797 PHYS/QHP OP CAR RHAB WO ECG: CPT

## 2024-09-11 RX ORDER — INSULIN PMP CART,AUT,G6/7,CNTR
EACH SUBCUTANEOUS
Qty: 13 | Refills: 3 | Status: ACTIVE | COMMUNITY
Start: 2024-09-03 | End: 1900-01-01

## 2024-09-16 ENCOUNTER — RX RENEWAL (OUTPATIENT)
Age: 59
End: 2024-09-16

## 2024-09-16 ENCOUNTER — APPOINTMENT (OUTPATIENT)
Dept: CARDIOLOGY | Facility: CLINIC | Age: 59
End: 2024-09-16
Payer: COMMERCIAL

## 2024-09-16 PROCEDURE — 93797 PHYS/QHP OP CAR RHAB WO ECG: CPT

## 2024-09-18 ENCOUNTER — APPOINTMENT (OUTPATIENT)
Dept: CARDIOLOGY | Facility: CLINIC | Age: 59
End: 2024-09-18
Payer: COMMERCIAL

## 2024-09-18 PROCEDURE — 93797 PHYS/QHP OP CAR RHAB WO ECG: CPT

## 2024-09-19 ENCOUNTER — APPOINTMENT (OUTPATIENT)
Dept: CARDIOLOGY | Facility: CLINIC | Age: 59
End: 2024-09-19
Payer: COMMERCIAL

## 2024-09-19 PROCEDURE — 93797 PHYS/QHP OP CAR RHAB WO ECG: CPT

## 2024-09-23 ENCOUNTER — APPOINTMENT (OUTPATIENT)
Dept: CARDIOLOGY | Facility: CLINIC | Age: 59
End: 2024-09-23
Payer: COMMERCIAL

## 2024-09-23 PROCEDURE — 93797 PHYS/QHP OP CAR RHAB WO ECG: CPT

## 2024-09-24 ENCOUNTER — APPOINTMENT (OUTPATIENT)
Dept: ENDOCRINOLOGY | Facility: CLINIC | Age: 59
End: 2024-09-24

## 2024-09-25 ENCOUNTER — APPOINTMENT (OUTPATIENT)
Dept: CARDIOLOGY | Facility: CLINIC | Age: 59
End: 2024-09-25
Payer: COMMERCIAL

## 2024-09-25 PROCEDURE — 93797 PHYS/QHP OP CAR RHAB WO ECG: CPT

## 2024-09-26 ENCOUNTER — APPOINTMENT (OUTPATIENT)
Dept: CARDIOLOGY | Facility: CLINIC | Age: 59
End: 2024-09-26
Payer: COMMERCIAL

## 2024-09-26 ENCOUNTER — RX RENEWAL (OUTPATIENT)
Age: 59
End: 2024-09-26

## 2024-09-26 PROCEDURE — 93797 PHYS/QHP OP CAR RHAB WO ECG: CPT

## 2024-09-27 RX ORDER — INSULIN LISPRO 100 [IU]/ML
100 INJECTION, SOLUTION INTRAVENOUS; SUBCUTANEOUS
Qty: 9 | Refills: 3 | Status: ACTIVE | COMMUNITY
Start: 2024-09-27 | End: 1900-01-01

## 2024-10-01 ENCOUNTER — APPOINTMENT (OUTPATIENT)
Dept: NEPHROLOGY | Facility: CLINIC | Age: 59
End: 2024-10-01
Payer: COMMERCIAL

## 2024-10-01 VITALS
HEART RATE: 97 BPM | OXYGEN SATURATION: 97 % | TEMPERATURE: 97.4 F | DIASTOLIC BLOOD PRESSURE: 81 MMHG | HEIGHT: 71 IN | BODY MASS INDEX: 31.92 KG/M2 | WEIGHT: 228 LBS | SYSTOLIC BLOOD PRESSURE: 154 MMHG

## 2024-10-01 DIAGNOSIS — I10 ESSENTIAL (PRIMARY) HYPERTENSION: ICD-10-CM

## 2024-10-01 DIAGNOSIS — N18.9 CHRONIC KIDNEY DISEASE, UNSPECIFIED: ICD-10-CM

## 2024-10-01 PROCEDURE — G2211 COMPLEX E/M VISIT ADD ON: CPT | Mod: NC

## 2024-10-01 PROCEDURE — 99205 OFFICE O/P NEW HI 60 MIN: CPT

## 2024-10-01 NOTE — HISTORY OF PRESENT ILLNESS
[FreeTextEntry1] : Pt (Tiago ) seen with daughter Sonia today.   59M with with IDDM, CAD s/p PCI x 4 last one was in March 24', HTN, flank pain referred by  for CKD.  Pt was sent to  by PCP Dr. Fuller for B flank pain. He was then found to have CKD 3 and was told he needs to see a nephrologist. He had an ultrasound done at [facility] radiology. He had no stones. He Had DR seen by optho and has had injections. He has been a diabetic since he was 22 yrs old.   He has HTN and checks BP at home usually < 130. Usually he takes it after he eats at noon.   Pt sees Dr. Vidales here and is currently in cardiac rehab.   Pt was told that the kidneys need to be managed. No blood in the urine, no dysuria. He has LE swelling but it is managed with diuretics. Sometimes he feels like his whole body swells. He eats salt but is trying to cut down.

## 2024-10-01 NOTE — PLAN
[TextEntry] : Unsure of baseline Cr given labs and what pt states is baseline GFR range. Will check labs for CKD today. He has uncontrolled HTN today but will start monitoring at home. Would benefit from ACE-I or ARB but hyperkalemia seen on previous labs. Will assess today and have pt return for repeat BP check. Renal US with signs of CKD.  Will call pt with results of labs

## 2024-10-01 NOTE — SOCIAL HISTORY
[TextEntry] : Retired from employment as a .  He lives with wife and has one child who is also a diabetic.

## 2024-10-01 NOTE — CURRENT MEDS
[TextEntry] : asa 81mg po qd Vit D carvedilol 12.5mg po bid rosuvastatin 10mg po qd furosemide 20mg po qd Brilinta 90mg po qd Lispro Basaglar  Mounjaro  Leqvio

## 2024-10-01 NOTE — PHYSICAL EXAM
[General Appearance - Alert] : alert [General Appearance - In No Acute Distress] : in no acute distress [Sclera] : the sclera and conjunctiva were normal [PERRL With Normal Accommodation] : pupils were equal in size, round, and reactive to light [Extraocular Movements] : extraocular movements were intact [Outer Ear] : the ears and nose were normal in appearance [Oropharynx] : the oropharynx was normal [Neck Appearance] : the appearance of the neck was normal [Neck Cervical Mass (___cm)] : no neck mass was observed [Jugular Venous Distention Increased] : there was no jugular-venous distention [Thyroid Diffuse Enlargement] : the thyroid was not enlarged [Thyroid Nodule] : there were no palpable thyroid nodules [Auscultation Breath Sounds / Voice Sounds] : lungs were clear to auscultation bilaterally [Heart Rate And Rhythm] : heart rate was normal and rhythm regular [Heart Sounds] : normal S1 and S2 [Heart Sounds Gallop] : no gallops [Murmurs] : no murmurs [Heart Sounds Pericardial Friction Rub] : no pericardial rub [Full Pulse] : the pedal pulses are present [Edema] : there was no peripheral edema [Bowel Sounds] : normal bowel sounds [Abdomen Soft] : soft [Abdomen Tenderness] : non-tender [Abdomen Mass (___ Cm)] : no abdominal mass palpated [Cervical Lymph Nodes Enlarged Posterior Bilaterally] : posterior cervical [Cervical Lymph Nodes Enlarged Anterior Bilaterally] : anterior cervical [Supraclavicular Lymph Nodes Enlarged Bilaterally] : supraclavicular [Abnormal Walk] : normal gait [Nail Clubbing] : no clubbing  or cyanosis of the fingernails [Musculoskeletal - Swelling] : no joint swelling seen [Motor Tone] : muscle strength and tone were normal [Skin Color & Pigmentation] : normal skin color and pigmentation [Skin Turgor] : normal skin turgor [] : no rash [No Focal Deficits] : no focal deficits [Oriented To Time, Place, And Person] : oriented to person, place, and time [Impaired Insight] : insight and judgment were intact [Affect] : the affect was normal

## 2024-10-01 NOTE — RESULTS/DATA
[TextEntry] : 6/4/24 renal US reviewed: echogenic kidneys bilaterally, no stones, fatty infiltration of liver

## 2024-10-03 ENCOUNTER — APPOINTMENT (OUTPATIENT)
Dept: CARDIOLOGY | Facility: CLINIC | Age: 59
End: 2024-10-03
Payer: COMMERCIAL

## 2024-10-03 PROCEDURE — 93797 PHYS/QHP OP CAR RHAB WO ECG: CPT

## 2024-10-04 LAB
25(OH)D3 SERPL-MCNC: 51.6 NG/ML
ALBUMIN SERPL ELPH-MCNC: 4.6 G/DL
ANION GAP SERPL CALC-SCNC: 17 MMOL/L
APPEARANCE: CLEAR
BACTERIA: NEGATIVE /HPF
BASOPHILS # BLD AUTO: 0.06 K/UL
BASOPHILS NFR BLD AUTO: 0.5 %
BILIRUBIN URINE: NEGATIVE
BLOOD URINE: NEGATIVE
BUN SERPL-MCNC: 15 MG/DL
CALCIUM SERPL-MCNC: 10.4 MG/DL
CALCIUM SERPL-MCNC: 10.4 MG/DL
CAST: 1 /LPF
CHLORIDE SERPL-SCNC: 98 MMOL/L
CO2 SERPL-SCNC: 24 MMOL/L
COLOR: YELLOW
CREAT SERPL-MCNC: 1.22 MG/DL
CREAT SPEC-SCNC: 227 MG/DL
CREAT/PROT UR: 0.7 RATIO
EGFR: 69 ML/MIN/1.73M2
EOSINOPHIL # BLD AUTO: 0.28 K/UL
EOSINOPHIL NFR BLD AUTO: 2.4 %
EPITHELIAL CELLS: 0 /HPF
GLUCOSE QUALITATIVE U: >=1000 MG/DL
GLUCOSE SERPL-MCNC: 197 MG/DL
HCT VFR BLD CALC: 51.1 %
HGB BLD-MCNC: 16 G/DL
IMM GRANULOCYTES NFR BLD AUTO: 0.5 %
KETONES URINE: ABNORMAL MG/DL
LEUKOCYTE ESTERASE URINE: NEGATIVE
LYMPHOCYTES # BLD AUTO: 3.91 K/UL
LYMPHOCYTES NFR BLD AUTO: 33.6 %
MAN DIFF?: NORMAL
MCHC RBC-ENTMCNC: 26.2 PG
MCHC RBC-ENTMCNC: 31.3 GM/DL
MCV RBC AUTO: 83.8 FL
MICROSCOPIC-UA: NORMAL
MONOCYTES # BLD AUTO: 0.82 K/UL
MONOCYTES NFR BLD AUTO: 7 %
NEUTROPHILS # BLD AUTO: 6.52 K/UL
NEUTROPHILS NFR BLD AUTO: 56 %
NITRITE URINE: NEGATIVE
PARATHYROID HORMONE INTACT: 24 PG/ML
PH URINE: 6
PHOSPHATE SERPL-MCNC: 3.3 MG/DL
PLATELET # BLD AUTO: 263 K/UL
POTASSIUM SERPL-SCNC: 5.1 MMOL/L
PROT UR-MCNC: 158 MG/DL
PROTEIN URINE: 300 MG/DL
RBC # BLD: 6.1 M/UL
RBC # FLD: 16.5 %
RED BLOOD CELLS URINE: 2 /HPF
SODIUM SERPL-SCNC: 138 MMOL/L
SPECIFIC GRAVITY URINE: 1.02
UROBILINOGEN URINE: 0.2 MG/DL
WBC # FLD AUTO: 11.65 K/UL
WHITE BLOOD CELLS URINE: 0 /HPF

## 2024-10-07 ENCOUNTER — APPOINTMENT (OUTPATIENT)
Dept: CARDIOLOGY | Facility: CLINIC | Age: 59
End: 2024-10-07
Payer: COMMERCIAL

## 2024-10-07 PROCEDURE — 93797 PHYS/QHP OP CAR RHAB WO ECG: CPT

## 2024-10-09 ENCOUNTER — APPOINTMENT (OUTPATIENT)
Dept: CARDIOLOGY | Facility: CLINIC | Age: 59
End: 2024-10-09
Payer: COMMERCIAL

## 2024-10-09 PROCEDURE — 93797 PHYS/QHP OP CAR RHAB WO ECG: CPT

## 2024-10-10 ENCOUNTER — APPOINTMENT (OUTPATIENT)
Dept: CARDIOLOGY | Facility: CLINIC | Age: 59
End: 2024-10-10

## 2024-10-10 PROCEDURE — 93797 PHYS/QHP OP CAR RHAB WO ECG: CPT

## 2024-10-14 ENCOUNTER — APPOINTMENT (OUTPATIENT)
Dept: CARDIOLOGY | Facility: CLINIC | Age: 59
End: 2024-10-14
Payer: COMMERCIAL

## 2024-10-14 PROCEDURE — 93797 PHYS/QHP OP CAR RHAB WO ECG: CPT

## 2024-10-16 ENCOUNTER — APPOINTMENT (OUTPATIENT)
Dept: CARDIOLOGY | Facility: CLINIC | Age: 59
End: 2024-10-16
Payer: COMMERCIAL

## 2024-10-16 PROCEDURE — 93797 PHYS/QHP OP CAR RHAB WO ECG: CPT

## 2024-10-17 ENCOUNTER — APPOINTMENT (OUTPATIENT)
Dept: ENDOCRINOLOGY | Facility: CLINIC | Age: 59
End: 2024-10-17
Payer: COMMERCIAL

## 2024-10-17 ENCOUNTER — APPOINTMENT (OUTPATIENT)
Dept: CARDIOLOGY | Facility: CLINIC | Age: 59
End: 2024-10-17
Payer: COMMERCIAL

## 2024-10-17 PROCEDURE — P0019: CPT

## 2024-10-17 PROCEDURE — 93797 PHYS/QHP OP CAR RHAB WO ECG: CPT

## 2024-10-23 ENCOUNTER — APPOINTMENT (OUTPATIENT)
Dept: CARDIOLOGY | Facility: CLINIC | Age: 59
End: 2024-10-23
Payer: COMMERCIAL

## 2024-10-23 PROCEDURE — 93797 PHYS/QHP OP CAR RHAB WO ECG: CPT

## 2024-10-30 ENCOUNTER — APPOINTMENT (OUTPATIENT)
Dept: CARDIOLOGY | Facility: CLINIC | Age: 59
End: 2024-10-30
Payer: COMMERCIAL

## 2024-10-30 PROCEDURE — 93797 PHYS/QHP OP CAR RHAB WO ECG: CPT

## 2024-10-31 ENCOUNTER — APPOINTMENT (OUTPATIENT)
Dept: CARDIOLOGY | Facility: CLINIC | Age: 59
End: 2024-10-31
Payer: COMMERCIAL

## 2024-10-31 PROCEDURE — 93797 PHYS/QHP OP CAR RHAB WO ECG: CPT

## 2024-11-04 ENCOUNTER — APPOINTMENT (OUTPATIENT)
Dept: CARDIOLOGY | Facility: CLINIC | Age: 59
End: 2024-11-04
Payer: COMMERCIAL

## 2024-11-04 PROCEDURE — 93797 PHYS/QHP OP CAR RHAB WO ECG: CPT

## 2024-11-07 ENCOUNTER — APPOINTMENT (OUTPATIENT)
Dept: CARDIOLOGY | Facility: CLINIC | Age: 59
End: 2024-11-07
Payer: COMMERCIAL

## 2024-11-07 PROCEDURE — 93797 PHYS/QHP OP CAR RHAB WO ECG: CPT

## 2024-11-11 ENCOUNTER — APPOINTMENT (OUTPATIENT)
Dept: CARDIOLOGY | Facility: CLINIC | Age: 59
End: 2024-11-11
Payer: COMMERCIAL

## 2024-11-11 PROCEDURE — 93797 PHYS/QHP OP CAR RHAB WO ECG: CPT

## 2024-11-13 ENCOUNTER — APPOINTMENT (OUTPATIENT)
Dept: CARDIOLOGY | Facility: CLINIC | Age: 59
End: 2024-11-13
Payer: COMMERCIAL

## 2024-11-13 PROCEDURE — 93797 PHYS/QHP OP CAR RHAB WO ECG: CPT

## 2024-11-18 ENCOUNTER — APPOINTMENT (OUTPATIENT)
Dept: CARDIOLOGY | Facility: CLINIC | Age: 59
End: 2024-11-18
Payer: COMMERCIAL

## 2024-11-18 PROCEDURE — 93797 PHYS/QHP OP CAR RHAB WO ECG: CPT

## 2024-11-26 ENCOUNTER — APPOINTMENT (OUTPATIENT)
Dept: CARDIOLOGY | Facility: CLINIC | Age: 59
End: 2024-11-26

## 2024-11-26 DIAGNOSIS — I25.10 ATHEROSCLEROTIC HEART DISEASE OF NATIVE CORONARY ARTERY W/OUT ANGINA PECTORIS: ICD-10-CM

## 2024-11-26 DIAGNOSIS — E11.9 TYPE 2 DIABETES MELLITUS W/OUT COMPLICATIONS: ICD-10-CM

## 2024-11-26 PROCEDURE — G2211 COMPLEX E/M VISIT ADD ON: CPT | Mod: NC

## 2024-11-26 PROCEDURE — 96372 THER/PROPH/DIAG INJ SC/IM: CPT

## 2024-11-26 PROCEDURE — 99214 OFFICE O/P EST MOD 30 MIN: CPT | Mod: 25

## 2024-11-26 RX ORDER — INCLISIRAN 284 MG/1.5ML
284 INJECTION, SOLUTION SUBCUTANEOUS
Refills: 0 | Status: COMPLETED | OUTPATIENT
Start: 2024-11-26

## 2024-11-26 RX ADMIN — INCLISIRAN 284 MG/1.5ML: 284 INJECTION, SOLUTION SUBCUTANEOUS at 00:00

## 2024-11-27 LAB
CHOLEST SERPL-MCNC: 116 MG/DL
ESTIMATED AVERAGE GLUCOSE: 206 MG/DL
HBA1C MFR BLD HPLC: 8.8 %
HDLC SERPL-MCNC: 71 MG/DL
LDLC SERPL CALC-MCNC: 29 MG/DL
NONHDLC SERPL-MCNC: 46 MG/DL
TRIGL SERPL-MCNC: 82 MG/DL

## 2024-12-02 ENCOUNTER — APPOINTMENT (OUTPATIENT)
Dept: CARDIOLOGY | Facility: CLINIC | Age: 59
End: 2024-12-02
Payer: COMMERCIAL

## 2024-12-02 ENCOUNTER — NON-APPOINTMENT (OUTPATIENT)
Age: 59
End: 2024-12-02

## 2024-12-02 VITALS
WEIGHT: 223 LBS | DIASTOLIC BLOOD PRESSURE: 78 MMHG | SYSTOLIC BLOOD PRESSURE: 130 MMHG | HEIGHT: 71 IN | HEART RATE: 91 BPM | BODY MASS INDEX: 31.22 KG/M2

## 2024-12-02 DIAGNOSIS — E78.5 HYPERLIPIDEMIA, UNSPECIFIED: ICD-10-CM

## 2024-12-02 DIAGNOSIS — I10 ESSENTIAL (PRIMARY) HYPERTENSION: ICD-10-CM

## 2024-12-02 PROCEDURE — 93797 PHYS/QHP OP CAR RHAB WO ECG: CPT

## 2024-12-02 PROCEDURE — 93000 ELECTROCARDIOGRAM COMPLETE: CPT | Mod: 59

## 2024-12-02 PROCEDURE — 99215 OFFICE O/P EST HI 40 MIN: CPT | Mod: 25

## 2024-12-05 ENCOUNTER — APPOINTMENT (OUTPATIENT)
Dept: CARDIOLOGY | Facility: CLINIC | Age: 59
End: 2024-12-05
Payer: COMMERCIAL

## 2024-12-05 PROCEDURE — 93797 PHYS/QHP OP CAR RHAB WO ECG: CPT

## 2024-12-09 ENCOUNTER — APPOINTMENT (OUTPATIENT)
Dept: CARDIOLOGY | Facility: CLINIC | Age: 59
End: 2024-12-09

## 2024-12-11 ENCOUNTER — APPOINTMENT (OUTPATIENT)
Dept: CARDIOLOGY | Facility: CLINIC | Age: 59
End: 2024-12-11

## 2024-12-18 ENCOUNTER — APPOINTMENT (OUTPATIENT)
Dept: CV DIAGNOSTICS | Facility: HOSPITAL | Age: 59
End: 2024-12-18

## 2024-12-18 ENCOUNTER — RESULT REVIEW (OUTPATIENT)
Age: 59
End: 2024-12-18

## 2024-12-18 ENCOUNTER — OUTPATIENT (OUTPATIENT)
Dept: OUTPATIENT SERVICES | Facility: HOSPITAL | Age: 59
LOS: 1 days | End: 2024-12-18
Payer: COMMERCIAL

## 2024-12-18 DIAGNOSIS — I25.10 ATHEROSCLEROTIC HEART DISEASE OF NATIVE CORONARY ARTERY WITHOUT ANGINA PECTORIS: ICD-10-CM

## 2024-12-18 DIAGNOSIS — Z95.5 PRESENCE OF CORONARY ANGIOPLASTY IMPLANT AND GRAFT: Chronic | ICD-10-CM

## 2024-12-18 PROCEDURE — 78452 HT MUSCLE IMAGE SPECT MULT: CPT | Mod: 26,MC

## 2024-12-18 PROCEDURE — 93017 CV STRESS TEST TRACING ONLY: CPT

## 2024-12-18 PROCEDURE — A9500: CPT

## 2024-12-18 PROCEDURE — 93018 CV STRESS TEST I&R ONLY: CPT | Mod: MC

## 2024-12-18 PROCEDURE — 82962 GLUCOSE BLOOD TEST: CPT

## 2024-12-18 PROCEDURE — 93016 CV STRESS TEST SUPVJ ONLY: CPT | Mod: MC

## 2024-12-18 PROCEDURE — 78452 HT MUSCLE IMAGE SPECT MULT: CPT | Mod: MC

## 2024-12-20 ENCOUNTER — APPOINTMENT (OUTPATIENT)
Dept: ENDOCRINOLOGY | Facility: CLINIC | Age: 59
End: 2024-12-20

## 2024-12-20 VITALS
HEIGHT: 71 IN | BODY MASS INDEX: 31.5 KG/M2 | DIASTOLIC BLOOD PRESSURE: 82 MMHG | WEIGHT: 225 LBS | OXYGEN SATURATION: 99 % | HEART RATE: 89 BPM | SYSTOLIC BLOOD PRESSURE: 140 MMHG

## 2024-12-20 DIAGNOSIS — E11.65 TYPE 2 DIABETES MELLITUS WITH OTHER DIABETIC OPHTHALMIC COMPLICATION: ICD-10-CM

## 2024-12-20 DIAGNOSIS — E11.39 TYPE 2 DIABETES MELLITUS WITH OTHER DIABETIC OPHTHALMIC COMPLICATION: ICD-10-CM

## 2024-12-20 DIAGNOSIS — E66.811 OBESITY, CLASS 1: ICD-10-CM

## 2024-12-20 DIAGNOSIS — E78.5 HYPERLIPIDEMIA, UNSPECIFIED: ICD-10-CM

## 2024-12-20 DIAGNOSIS — I10 ESSENTIAL (PRIMARY) HYPERTENSION: ICD-10-CM

## 2024-12-20 PROCEDURE — 99215 OFFICE O/P EST HI 40 MIN: CPT

## 2024-12-20 PROCEDURE — 95251 CONT GLUC MNTR ANALYSIS I&R: CPT

## 2024-12-20 RX ORDER — TIRZEPATIDE 5 MG/.5ML
5 INJECTION, SOLUTION SUBCUTANEOUS
Qty: 4 | Refills: 2 | Status: ACTIVE | COMMUNITY
Start: 2024-12-20 | End: 1900-01-01

## 2024-12-20 RX ORDER — DAPAGLIFLOZIN 5 MG/1
5 TABLET, FILM COATED ORAL
Qty: 90 | Refills: 0 | Status: ACTIVE | COMMUNITY
Start: 2024-12-20 | End: 1900-01-01

## 2025-01-06 ENCOUNTER — APPOINTMENT (OUTPATIENT)
Dept: CARDIOLOGY | Facility: CLINIC | Age: 60
End: 2025-01-06

## 2025-01-09 ENCOUNTER — APPOINTMENT (OUTPATIENT)
Dept: CARDIOLOGY | Facility: CLINIC | Age: 60
End: 2025-01-09

## 2025-01-15 ENCOUNTER — APPOINTMENT (OUTPATIENT)
Dept: CARDIOLOGY | Facility: CLINIC | Age: 60
End: 2025-01-15

## 2025-01-29 ENCOUNTER — APPOINTMENT (OUTPATIENT)
Dept: CARDIOLOGY | Facility: CLINIC | Age: 60
End: 2025-01-29
Payer: COMMERCIAL

## 2025-01-29 PROCEDURE — 93797 PHYS/QHP OP CAR RHAB WO ECG: CPT

## 2025-02-03 ENCOUNTER — APPOINTMENT (OUTPATIENT)
Dept: ENDOCRINOLOGY | Facility: CLINIC | Age: 60
End: 2025-02-03
Payer: COMMERCIAL

## 2025-02-03 PROCEDURE — G0108 DIAB MANAGE TRN  PER INDIV: CPT

## 2025-02-10 ENCOUNTER — APPOINTMENT (OUTPATIENT)
Dept: CARDIOLOGY | Facility: CLINIC | Age: 60
End: 2025-02-10

## 2025-02-10 DIAGNOSIS — E11.9 TYPE 2 DIABETES MELLITUS W/OUT COMPLICATIONS: ICD-10-CM

## 2025-02-18 ENCOUNTER — APPOINTMENT (OUTPATIENT)
Dept: HEART FAILURE | Facility: CLINIC | Age: 60
End: 2025-02-18
Payer: COMMERCIAL

## 2025-02-18 VITALS
BODY MASS INDEX: 30.52 KG/M2 | SYSTOLIC BLOOD PRESSURE: 146 MMHG | HEART RATE: 87 BPM | WEIGHT: 218 LBS | OXYGEN SATURATION: 98 % | DIASTOLIC BLOOD PRESSURE: 83 MMHG | TEMPERATURE: 98 F | HEIGHT: 71 IN

## 2025-02-18 PROCEDURE — 99215 OFFICE O/P EST HI 40 MIN: CPT

## 2025-02-18 PROCEDURE — 93000 ELECTROCARDIOGRAM COMPLETE: CPT

## 2025-02-18 RX ORDER — METOPROLOL SUCCINATE 50 MG/1
50 TABLET, EXTENDED RELEASE ORAL DAILY
Qty: 45 | Refills: 5 | Status: ACTIVE | COMMUNITY
Start: 2025-02-18 | End: 1900-01-01

## 2025-02-19 LAB
ANION GAP SERPL CALC-SCNC: 15 MMOL/L
BUN SERPL-MCNC: 18 MG/DL
CALCIUM SERPL-MCNC: 9.8 MG/DL
CHLORIDE SERPL-SCNC: 104 MMOL/L
CO2 SERPL-SCNC: 25 MMOL/L
CREAT SERPL-MCNC: 1.26 MG/DL
EGFR: 66 ML/MIN/1.73M2
GLUCOSE SERPL-MCNC: 200 MG/DL
MAGNESIUM SERPL-MCNC: 2 MG/DL
NT-PROBNP SERPL-MCNC: <36 PG/ML
POTASSIUM SERPL-SCNC: 4.7 MMOL/L
SODIUM SERPL-SCNC: 143 MMOL/L

## 2025-02-21 ENCOUNTER — NON-APPOINTMENT (OUTPATIENT)
Age: 60
End: 2025-02-21

## 2025-02-26 ENCOUNTER — APPOINTMENT (OUTPATIENT)
Dept: CARDIOLOGY | Facility: CLINIC | Age: 60
End: 2025-02-26
Payer: COMMERCIAL

## 2025-02-26 VITALS
OXYGEN SATURATION: 98 % | HEART RATE: 86 BPM | BODY MASS INDEX: 30.13 KG/M2 | SYSTOLIC BLOOD PRESSURE: 127 MMHG | DIASTOLIC BLOOD PRESSURE: 83 MMHG | WEIGHT: 216 LBS

## 2025-02-26 DIAGNOSIS — R07.9 CHEST PAIN, UNSPECIFIED: ICD-10-CM

## 2025-02-26 DIAGNOSIS — E78.5 HYPERLIPIDEMIA, UNSPECIFIED: ICD-10-CM

## 2025-02-26 DIAGNOSIS — I10 ESSENTIAL (PRIMARY) HYPERTENSION: ICD-10-CM

## 2025-02-26 DIAGNOSIS — I25.10 ATHEROSCLEROTIC HEART DISEASE OF NATIVE CORONARY ARTERY W/OUT ANGINA PECTORIS: ICD-10-CM

## 2025-02-26 PROCEDURE — 99215 OFFICE O/P EST HI 40 MIN: CPT | Mod: 25

## 2025-02-26 PROCEDURE — 99401 PREV MED CNSL INDIV APPRX 15: CPT

## 2025-03-10 ENCOUNTER — APPOINTMENT (OUTPATIENT)
Dept: HEART FAILURE | Facility: CLINIC | Age: 60
End: 2025-03-10
Payer: COMMERCIAL

## 2025-03-10 DIAGNOSIS — E11.9 TYPE 2 DIABETES MELLITUS W/OUT COMPLICATIONS: ICD-10-CM

## 2025-03-10 DIAGNOSIS — Z95.5 PRESENCE OF CORONARY ANGIOPLASTY IMPLANT AND GRAFT: ICD-10-CM

## 2025-03-10 DIAGNOSIS — R07.9 CHEST PAIN, UNSPECIFIED: ICD-10-CM

## 2025-03-10 DIAGNOSIS — I50.20 UNSPECIFIED SYSTOLIC (CONGESTIVE) HEART FAILURE: ICD-10-CM

## 2025-03-10 PROCEDURE — 99214 OFFICE O/P EST MOD 30 MIN: CPT | Mod: 93

## 2025-03-24 ENCOUNTER — APPOINTMENT (OUTPATIENT)
Dept: NEPHROLOGY | Facility: CLINIC | Age: 60
End: 2025-03-24
Payer: COMMERCIAL

## 2025-03-24 VITALS — DIASTOLIC BLOOD PRESSURE: 80 MMHG | SYSTOLIC BLOOD PRESSURE: 136 MMHG

## 2025-03-24 VITALS
OXYGEN SATURATION: 98 % | TEMPERATURE: 97.8 F | SYSTOLIC BLOOD PRESSURE: 138 MMHG | HEIGHT: 71 IN | HEART RATE: 78 BPM | DIASTOLIC BLOOD PRESSURE: 87 MMHG

## 2025-03-24 DIAGNOSIS — R80.9 PROTEINURIA, UNSPECIFIED: ICD-10-CM

## 2025-03-24 PROCEDURE — G2211 COMPLEX E/M VISIT ADD ON: CPT | Mod: NC

## 2025-03-24 PROCEDURE — 99215 OFFICE O/P EST HI 40 MIN: CPT

## 2025-03-24 RX ORDER — DAPAGLIFLOZIN 5 MG/1
5 TABLET, FILM COATED ORAL DAILY
Qty: 90 | Refills: 3 | Status: ACTIVE | COMMUNITY
Start: 2025-03-24 | End: 1900-01-01

## 2025-03-28 ENCOUNTER — NON-APPOINTMENT (OUTPATIENT)
Age: 60
End: 2025-03-28

## 2025-03-28 ENCOUNTER — APPOINTMENT (OUTPATIENT)
Dept: ENDOCRINOLOGY | Facility: CLINIC | Age: 60
End: 2025-03-28

## 2025-03-28 VITALS
WEIGHT: 216 LBS | HEIGHT: 71 IN | HEART RATE: 86 BPM | OXYGEN SATURATION: 98 % | DIASTOLIC BLOOD PRESSURE: 84 MMHG | SYSTOLIC BLOOD PRESSURE: 130 MMHG | BODY MASS INDEX: 30.24 KG/M2

## 2025-03-28 DIAGNOSIS — E78.5 HYPERLIPIDEMIA, UNSPECIFIED: ICD-10-CM

## 2025-03-28 DIAGNOSIS — I10 ESSENTIAL (PRIMARY) HYPERTENSION: ICD-10-CM

## 2025-03-28 DIAGNOSIS — E11.9 TYPE 2 DIABETES MELLITUS W/OUT COMPLICATIONS: ICD-10-CM

## 2025-03-28 DIAGNOSIS — E66.811 OBESITY, CLASS 1: ICD-10-CM

## 2025-03-28 PROCEDURE — 83036 HEMOGLOBIN GLYCOSYLATED A1C: CPT | Mod: QW

## 2025-03-28 PROCEDURE — 95251 CONT GLUC MNTR ANALYSIS I&R: CPT

## 2025-03-28 PROCEDURE — 99214 OFFICE O/P EST MOD 30 MIN: CPT

## 2025-03-28 PROCEDURE — 82962 GLUCOSE BLOOD TEST: CPT

## 2025-03-28 RX ORDER — INSULIN LISPRO 100 [IU]/ML
100 INJECTION, SOLUTION INTRAVENOUS; SUBCUTANEOUS
Qty: 9 | Refills: 3 | Status: ACTIVE | COMMUNITY
Start: 2025-03-28 | End: 1900-01-01

## 2025-03-28 RX ORDER — TIRZEPATIDE 5 MG/.5ML
5 INJECTION, SOLUTION SUBCUTANEOUS
Qty: 3 | Refills: 0 | Status: ACTIVE | COMMUNITY
Start: 2025-03-28 | End: 1900-01-01

## 2025-03-30 LAB
GLUCOSE BLDC GLUCOMTR-MCNC: 120
HBA1C MFR BLD HPLC: 8.1

## 2025-04-16 ENCOUNTER — APPOINTMENT (OUTPATIENT)
Dept: CARDIOLOGY | Facility: CLINIC | Age: 60
End: 2025-04-16
Payer: COMMERCIAL

## 2025-04-16 VITALS
HEART RATE: 91 BPM | OXYGEN SATURATION: 97 % | WEIGHT: 208 LBS | DIASTOLIC BLOOD PRESSURE: 70 MMHG | SYSTOLIC BLOOD PRESSURE: 130 MMHG | BODY MASS INDEX: 29.01 KG/M2

## 2025-04-16 DIAGNOSIS — R07.9 CHEST PAIN, UNSPECIFIED: ICD-10-CM

## 2025-04-16 DIAGNOSIS — I10 ESSENTIAL (PRIMARY) HYPERTENSION: ICD-10-CM

## 2025-04-16 DIAGNOSIS — E78.5 HYPERLIPIDEMIA, UNSPECIFIED: ICD-10-CM

## 2025-04-16 PROCEDURE — 99214 OFFICE O/P EST MOD 30 MIN: CPT

## 2025-04-16 PROCEDURE — G2211 COMPLEX E/M VISIT ADD ON: CPT | Mod: NC

## 2025-04-16 RX ORDER — CARVEDILOL 6.25 MG/1
6.25 TABLET, FILM COATED ORAL TWICE DAILY
Qty: 180 | Refills: 2 | Status: ACTIVE | COMMUNITY
Start: 2025-04-16 | End: 1900-01-01

## 2025-04-17 ENCOUNTER — APPOINTMENT (OUTPATIENT)
Dept: ENDOCRINOLOGY | Facility: CLINIC | Age: 60
End: 2025-04-17

## 2025-04-17 DIAGNOSIS — E11.9 TYPE 2 DIABETES MELLITUS W/OUT COMPLICATIONS: ICD-10-CM

## 2025-04-17 PROCEDURE — G0108 DIAB MANAGE TRN  PER INDIV: CPT | Mod: 95

## 2025-05-19 ENCOUNTER — APPOINTMENT (OUTPATIENT)
Dept: NEPHROLOGY | Facility: CLINIC | Age: 60
End: 2025-05-19

## 2025-05-20 ENCOUNTER — NON-APPOINTMENT (OUTPATIENT)
Age: 60
End: 2025-05-20

## 2025-05-20 ENCOUNTER — APPOINTMENT (OUTPATIENT)
Dept: CARDIOLOGY | Facility: CLINIC | Age: 60
End: 2025-05-20
Payer: COMMERCIAL

## 2025-05-20 VITALS
WEIGHT: 210 LBS | OXYGEN SATURATION: 95 % | DIASTOLIC BLOOD PRESSURE: 88 MMHG | HEART RATE: 92 BPM | BODY MASS INDEX: 29.29 KG/M2 | SYSTOLIC BLOOD PRESSURE: 140 MMHG

## 2025-05-20 DIAGNOSIS — E66.811 OBESITY, CLASS 1: ICD-10-CM

## 2025-05-20 DIAGNOSIS — I10 ESSENTIAL (PRIMARY) HYPERTENSION: ICD-10-CM

## 2025-05-20 DIAGNOSIS — E78.5 HYPERLIPIDEMIA, UNSPECIFIED: ICD-10-CM

## 2025-05-20 DIAGNOSIS — I50.20 UNSPECIFIED SYSTOLIC (CONGESTIVE) HEART FAILURE: ICD-10-CM

## 2025-05-20 PROCEDURE — 96372 THER/PROPH/DIAG INJ SC/IM: CPT

## 2025-05-20 PROCEDURE — 99214 OFFICE O/P EST MOD 30 MIN: CPT | Mod: 25

## 2025-05-27 ENCOUNTER — APPOINTMENT (OUTPATIENT)
Dept: CARDIOLOGY | Facility: CLINIC | Age: 60
End: 2025-05-27

## 2025-06-05 ENCOUNTER — APPOINTMENT (OUTPATIENT)
Dept: ENDOCRINOLOGY | Facility: CLINIC | Age: 60
End: 2025-06-05

## 2025-06-05 ENCOUNTER — APPOINTMENT (OUTPATIENT)
Dept: NEPHROLOGY | Facility: CLINIC | Age: 60
End: 2025-06-05
Payer: SELF-PAY

## 2025-06-05 VITALS
HEART RATE: 55 BPM | TEMPERATURE: 97.2 F | HEIGHT: 71 IN | WEIGHT: 216 LBS | OXYGEN SATURATION: 97 % | BODY MASS INDEX: 30.24 KG/M2 | DIASTOLIC BLOOD PRESSURE: 81 MMHG | SYSTOLIC BLOOD PRESSURE: 133 MMHG

## 2025-06-05 DIAGNOSIS — R80.9 PROTEINURIA, UNSPECIFIED: ICD-10-CM

## 2025-06-05 DIAGNOSIS — N18.9 CHRONIC KIDNEY DISEASE, UNSPECIFIED: ICD-10-CM

## 2025-06-05 DIAGNOSIS — I10 ESSENTIAL (PRIMARY) HYPERTENSION: ICD-10-CM

## 2025-06-05 PROCEDURE — 99215 OFFICE O/P EST HI 40 MIN: CPT

## 2025-06-05 PROCEDURE — G2211 COMPLEX E/M VISIT ADD ON: CPT

## 2025-06-10 ENCOUNTER — APPOINTMENT (OUTPATIENT)
Dept: CARDIOLOGY | Facility: CLINIC | Age: 60
End: 2025-06-10

## 2025-06-18 NOTE — H&P ADULT - PROBLEM/PLAN-1
06/18/2025  [facility]  Anesthesia Pre-Operative Evaluation         Patient Name: Ross Crystal  YOB: 1963  MRN: [id number]    SUBJECTIVE:     Pre-operative evaluation for Procedure(s) (LRB):  DEBRIDEMENT, PHALANX, HAND (Left)     06/18/2025    Ross Crystal is a 62 y.o. male w/ a significant PMHx of DM, HLD, chronic pain, HTN, IDDM, smoker. Patient now presents for the above procedure(s).    TTE:   none documented.     Patient Active Problem List   Diagnosis    Foot pain    Diabetes mellitus type 2, uncontrolled    Mixed hyperlipidemia    Neuropathy    Neuropathic pain    Vertigo    Colon polyps    Calcaneal spur of left foot    Hydrocele, acquired    Orchalgia    Epididymitis, right    Benign prostatic hyperplasia without urinary obstruction    Chronic pain syndrome    Essential hypertension    Long term current use of insulin    Low back pain    Mild recurrent major depression    Opioid dependence    Pure hypercholesterolemia    Tobacco abuse    Type 2 diabetes mellitus without complication    Personal history of colonic polyps    Preoperative examination    Cellulitis of left upper extremity    Leukocytosis    Cellulitis of left little finger    Hand arthritis    Pulp abscess of finger of left hand    Abscess of finger of left hand    Bilateral carpal tunnel syndrome    Diplopia    BPH (benign prostatic hyperplasia)    Acute appendicitis with localized peritonitis, without perforation, abscess, or gangrene       Review of patient's allergies indicates:   Allergen Reactions    Sulfamethoxazole      Other Reaction(s): hives rash    Trimethoprim      Other Reaction(s): hives rash    Bactrim [sulfamethoxazole-trimethoprim] Rash       Current Inpatient Medications:      No current facility-administered medications on file prior to encounter.     Current Outpatient Medications on  "File Prior to Encounter   Medication Sig Dispense Refill    aspirin (ECOTRIN) 81 MG EC tablet Take 81 mg by mouth once daily.      atorvastatin (LIPITOR) 80 MG tablet Take 80 mg by mouth.      cyclobenzaprine (FLEXERIL) 10 MG tablet Take 10 mg by mouth.      empagliflozin (JARDIANCE) 25 mg tablet Take 1 tablet (25 mg total) by mouth once daily. 30 tablet 11    finasteride (PROSCAR) 5 mg tablet Take 1 tablet (5 mg total) by mouth once daily. 30 tablet 11    fish oil-omega-3 fatty acids 300-1,000 mg capsule Take 1 capsule by mouth 2 (two) times a day.      HYDROcodone-acetaminophen (NORCO) 5-325 mg per tablet Take 1 tablet by mouth every 4 (four) hours as needed for Pain. 20 tablet 0    insulin aspart U-100 (NOVOLOG FLEXPEN U-100 INSULIN) 100 unit/mL (3 mL) InPn pen Inject 7 Units into the skin 3 (three) times daily with meals. 9 mL 11    insulin glargine U-100, Lantus, 100 unit/mL (3 mL) SubQ InPn pen Inject 14 Units into the skin once daily. 6 mL 11    tamsulosin (FLOMAX) 0.4 mg Cap Take 1 capsule (0.4 mg total) by mouth 2 (two) times a day. 60 capsule 11    BAQSIMI 3 mg/actuation [place] 3 mg by Nasal route.      BD ULTRA-FINE JENNY PEN NEEDLE 32 gauge x 5/32" Ndle 4 (four) times daily.      BD ULTRA-FINE JENNY PEN NEEDLE 32 gauge x 5/32" Ndle To inject insulin 4 times daily 100 each 11    blood sugar diagnostic (TRUE METRIX GLUCOSE TEST STRIP) Strp Check BG three times daily 100 strip 11    blood-glucose sensor (FREESTYLE JULIO 3 PLUS SENSOR) Sadaf To use for glucose monitoring every 15 days. 2 each 11    flash glucose scanning reader (FREESTYLE JULIO 2 READER) Misc Use as instructed for blood glucose monitoring.      flash glucose sensor (FREESTYLE JULIO 14 DAY SENSOR) Kit Inject 1 each into the skin.      FREESTYLE JULIO 2 SENSOR Kit CHANGE EVERY 14 DAYS FOR BLOOD GLUCOSE MONITORING      FREESTYLE JULIO 3 READER Misc 1 Device by Misc.(Non-Drug; Combo Route) route once. for 1 dose 1 each 0    insulin syringe-needle " U-100 1 mL 31 gauge x 5/16 Syrg       meclizine (ANTIVERT) 50 MG tablet Take 50 mg by mouth 2 (two) times daily.      meloxicam (MOBIC) 15 MG tablet Take 15 mg by mouth.      pravastatin (PRAVACHOL) 40 MG tablet Take 40 mg by mouth every evening.      TRUE METRIX GLUCOSE TEST STRIP Strp 1 strip 4 (four) times daily.      TRUEPLUS LANCETS 33 gauge Misc USE TO TEST BLOOD SUGAR TWO TIMES DAILY         Past Surgical History:   Procedure Laterality Date    CARPAL TUNNEL RELEASE  25 YEARS AGO    COLONOSCOPY      COLONOSCOPY N/A 1/25/2016    Procedure: COLONOSCOPY;  Surgeon: Luis Bogran-Reyes, MD;  Location: [facility];  Service: Endoscopy;  Laterality: N/A;    DEBRIDEMENT, PHALANX, HAND Left 12/29/2022    Procedure: DEBRIDEMENT, PHALANX, HAND;  Surgeon: Abdon Reid Jr., MD;  Location: [facility] OR;  Service: Orthopedics;  Laterality: Left;    DEBRIDEMENT, PHALANX, HAND Left 2/20/2024    Procedure: DEBRIDEMENT, PHALANX, HAND;  Surgeon: Abdon Reid Jr., MD;  Location: [facility] OR;  Service: Orthopedics;  Laterality: Left;    ESOPHAGOGASTRODUODENOSCOPY      HERNIA REPAIR      HERNIA REPAIR         OBJECTIVE:     Vital Signs Range (Last 24H):  Temp:  [36.6 °C (97.8 °F)-37.9 °C (100.2 °F)]   Pulse:  []   Resp:  [16-20]   BP: (119-144)/(67-80)   SpO2:  [91 %-97 %]       Significant Labs:  Lab Results   Component Value Date    WBC 22.00 (H) 06/18/2025    HGB 16.0 06/18/2025    HCT 47.3 06/18/2025     06/18/2025    CHOL 188 02/11/2025    TRIG 96 02/11/2025    HDL 59 02/11/2025    ALT 20 06/18/2025    AST 15 06/18/2025     (L) 06/18/2025    K 3.9 06/18/2025     06/18/2025    CREATININE 0.8 06/18/2025    BUN 17 06/18/2025    CO2 19 (L) 06/18/2025    TSH 3.765 02/11/2025    PSA 0.94 10/19/2015    INR 0.9 02/11/2025    HGBA1C 7.3 (H) 06/17/2025       Diagnostic Studies: No relevant studies.    EKG:   Results for orders placed or performed during the hospital encounter of 02/22/18   EKG 12-lead    Collection  Time: 02/22/18  1:28 PM    Narrative    Test Reason : R07.9  Vent. Rate : 080 BPM     Atrial Rate : 080 BPM     P-R Int : 142 ms          QRS Dur : 094 ms      QT Int : 368 ms       P-R-T Axes : 065 062 038 degrees     QTc Int : 424 ms    Normal sinus rhythm  Normal ECG  When compared with ECG of 17-JUL-2016 17:39,  No significant change was found  Confirmed by Jaret Kingsley MD ([id number]) on 2/23/2018 9:14:12 AM    Referred By: VÍCTOR SILVERIO           Confirmed By:Jaret Kingsley MD       ASSESSMENT/PLAN:           Pre-op Assessment    I have reviewed the Patient Summary Reports.     I have reviewed the Nursing Notes. I have reviewed the NPO Status.   I have reviewed the Medications.     Review of Systems  Anesthesia Hx:    Pt reports that he doesn't like how anesthesia makes him feel.   History of prior surgery of interest to airway management or planning:            Denies Personal Hx of Anesthesia complications.                    Social:  Smoker, No Alcohol Use 2 PPD smoker      Hematology/Oncology:  Hematology Normal   Oncology Normal                                   EENT/Dental:  EENT/Dental Normal           Cardiovascular:  Exercise tolerance: good   Hypertension           hyperlipidemia                               Pulmonary:  Pulmonary Normal   Denies COPD.  Denies Asthma.     Denies Sleep Apnea.                Renal/:  Chronic Renal Disease  BPH              Hepatic/GI:  Hepatic/GI Normal     Denies GERD.                Neurological:  Denies TIA.  Denies CVA. Neuromuscular Disease,                                   Endocrine:  Diabetes, poorly controlled, using insulin Denies Hypothyroidism.  Denies Hyperthyroidism.         Psych:  Psychiatric History  depression                Physical Exam  General: Alert, Oriented and Cooperative    Airway:  Mallampati: III / II  Mouth Opening: Normal  TM Distance: Normal  Tongue: Normal  Neck ROM: Normal ROM    Dental:  Edentulous, Dentures        Anesthesia Plan  Type  of Anesthesia, risks & benefits discussed:    Anesthesia Type: Gen Natural Airway  Intra-op Monitoring Plan: Standard ASA Monitors  Post Op Pain Control Plan: multimodal analgesia  Induction:  IV  ASA Score: 3  Day of Surgery Review of History & Physical: H&P Update referred to the surgeon/provider.    Ready For Surgery From Anesthesia Perspective.     .       DISPLAY PLAN FREE TEXT

## 2025-07-07 ENCOUNTER — OUTPATIENT (OUTPATIENT)
Dept: OUTPATIENT SERVICES | Facility: HOSPITAL | Age: 60
LOS: 1 days | End: 2025-07-07
Payer: MEDICAID

## 2025-07-07 ENCOUNTER — RESULT REVIEW (OUTPATIENT)
Age: 60
End: 2025-07-07

## 2025-07-07 ENCOUNTER — APPOINTMENT (OUTPATIENT)
Dept: CV DIAGNOSITCS | Facility: HOSPITAL | Age: 60
End: 2025-07-07

## 2025-07-07 DIAGNOSIS — I25.10 ATHEROSCLEROTIC HEART DISEASE OF NATIVE CORONARY ARTERY WITHOUT ANGINA PECTORIS: ICD-10-CM

## 2025-07-07 DIAGNOSIS — Z95.5 PRESENCE OF CORONARY ANGIOPLASTY IMPLANT AND GRAFT: Chronic | ICD-10-CM

## 2025-07-07 PROCEDURE — 93306 TTE W/DOPPLER COMPLETE: CPT | Mod: 26

## 2025-07-10 ENCOUNTER — APPOINTMENT (OUTPATIENT)
Dept: ENDOCRINOLOGY | Facility: CLINIC | Age: 60
End: 2025-07-10

## 2025-07-10 VITALS
SYSTOLIC BLOOD PRESSURE: 110 MMHG | HEIGHT: 71 IN | DIASTOLIC BLOOD PRESSURE: 72 MMHG | OXYGEN SATURATION: 99 % | BODY MASS INDEX: 31.08 KG/M2 | WEIGHT: 222 LBS | HEART RATE: 80 BPM

## 2025-07-10 PROBLEM — Z96.41 INSULIN PUMP IN PLACE: Status: ACTIVE | Noted: 2025-07-10

## 2025-07-10 PROBLEM — Z46.81 INSULIN PUMP TITRATION: Status: ACTIVE | Noted: 2025-07-10

## 2025-07-10 LAB — HBA1C MFR BLD HPLC: 7.9

## 2025-07-10 PROCEDURE — 83036 HEMOGLOBIN GLYCOSYLATED A1C: CPT | Mod: QW

## 2025-07-10 PROCEDURE — 99214 OFFICE O/P EST MOD 30 MIN: CPT | Mod: 25

## 2025-07-10 PROCEDURE — 95251 CONT GLUC MNTR ANALYSIS I&R: CPT

## 2025-08-04 ENCOUNTER — APPOINTMENT (OUTPATIENT)
Dept: NEPHROLOGY | Facility: CLINIC | Age: 60
End: 2025-08-04
Payer: MEDICAID

## 2025-08-04 VITALS
WEIGHT: 220 LBS | HEIGHT: 71 IN | OXYGEN SATURATION: 98 % | SYSTOLIC BLOOD PRESSURE: 113 MMHG | BODY MASS INDEX: 30.8 KG/M2 | DIASTOLIC BLOOD PRESSURE: 69 MMHG | HEART RATE: 86 BPM | TEMPERATURE: 97 F

## 2025-08-04 DIAGNOSIS — I10 ESSENTIAL (PRIMARY) HYPERTENSION: ICD-10-CM

## 2025-08-04 DIAGNOSIS — R80.9 PROTEINURIA, UNSPECIFIED: ICD-10-CM

## 2025-08-04 DIAGNOSIS — N18.9 CHRONIC KIDNEY DISEASE, UNSPECIFIED: ICD-10-CM

## 2025-08-04 PROCEDURE — 99215 OFFICE O/P EST HI 40 MIN: CPT

## 2025-08-04 PROCEDURE — G2211 COMPLEX E/M VISIT ADD ON: CPT | Mod: NC

## 2025-08-06 ENCOUNTER — APPOINTMENT (OUTPATIENT)
Dept: CARDIOLOGY | Facility: CLINIC | Age: 60
End: 2025-08-06
Payer: MEDICAID

## 2025-08-06 VITALS
DIASTOLIC BLOOD PRESSURE: 80 MMHG | SYSTOLIC BLOOD PRESSURE: 120 MMHG | WEIGHT: 220 LBS | BODY MASS INDEX: 30.68 KG/M2 | HEART RATE: 94 BPM | OXYGEN SATURATION: 98 %

## 2025-08-06 DIAGNOSIS — E78.5 HYPERLIPIDEMIA, UNSPECIFIED: ICD-10-CM

## 2025-08-06 DIAGNOSIS — E11.9 TYPE 2 DIABETES MELLITUS W/OUT COMPLICATIONS: ICD-10-CM

## 2025-08-06 DIAGNOSIS — I50.20 UNSPECIFIED SYSTOLIC (CONGESTIVE) HEART FAILURE: ICD-10-CM

## 2025-08-06 PROCEDURE — 99215 OFFICE O/P EST HI 40 MIN: CPT

## 2025-08-06 PROCEDURE — G2211 COMPLEX E/M VISIT ADD ON: CPT | Mod: NC

## 2025-08-18 ENCOUNTER — APPOINTMENT (OUTPATIENT)
Dept: CARDIOLOGY | Facility: CLINIC | Age: 60
End: 2025-08-18

## 2025-09-16 ENCOUNTER — APPOINTMENT (OUTPATIENT)
Dept: CARDIOLOGY | Facility: CLINIC | Age: 60
End: 2025-09-16
Payer: MEDICAID

## 2025-09-16 VITALS
BODY MASS INDEX: 30.82 KG/M2 | SYSTOLIC BLOOD PRESSURE: 140 MMHG | DIASTOLIC BLOOD PRESSURE: 80 MMHG | OXYGEN SATURATION: 97 % | HEART RATE: 77 BPM | WEIGHT: 221 LBS

## 2025-09-16 DIAGNOSIS — I10 ESSENTIAL (PRIMARY) HYPERTENSION: ICD-10-CM

## 2025-09-16 DIAGNOSIS — E66.811 OBESITY, CLASS 1: ICD-10-CM

## 2025-09-16 DIAGNOSIS — R07.9 CHEST PAIN, UNSPECIFIED: ICD-10-CM

## 2025-09-16 DIAGNOSIS — E78.5 HYPERLIPIDEMIA, UNSPECIFIED: ICD-10-CM

## 2025-09-16 PROCEDURE — 99215 OFFICE O/P EST HI 40 MIN: CPT

## 2025-09-16 PROCEDURE — G2211 COMPLEX E/M VISIT ADD ON: CPT | Mod: NC

## 2025-09-17 LAB
ALBUMIN SERPL ELPH-MCNC: 4.4 G/DL
ALP BLD-CCNC: 90 U/L
ALT SERPL-CCNC: 22 U/L
ANION GAP SERPL CALC-SCNC: 12 MMOL/L
AST SERPL-CCNC: 21 U/L
BILIRUB SERPL-MCNC: 0.5 MG/DL
BUN SERPL-MCNC: 14 MG/DL
CALCIUM SERPL-MCNC: 10.1 MG/DL
CHLORIDE SERPL-SCNC: 104 MMOL/L
CHOLEST SERPL-MCNC: 139 MG/DL
CO2 SERPL-SCNC: 24 MMOL/L
CREAT SERPL-MCNC: 1.18 MG/DL
EGFRCR SERPLBLD CKD-EPI 2021: 71 ML/MIN/1.73M2
ESTIMATED AVERAGE GLUCOSE: 189 MG/DL
GLUCOSE SERPL-MCNC: 127 MG/DL
HBA1C MFR BLD HPLC: 8.2 %
HDLC SERPL-MCNC: 73 MG/DL
LDLC SERPL-MCNC: 52 MG/DL
NONHDLC SERPL-MCNC: 66 MG/DL
POTASSIUM SERPL-SCNC: 4.6 MMOL/L
PROT SERPL-MCNC: 7.4 G/DL
SODIUM SERPL-SCNC: 141 MMOL/L
TRIGL SERPL-MCNC: 72 MG/DL

## 2025-09-18 ENCOUNTER — RX RENEWAL (OUTPATIENT)
Age: 60
End: 2025-09-18

## 2025-09-18 DIAGNOSIS — R30.0 DYSURIA: ICD-10-CM

## 2025-09-18 RX ORDER — DAPAGLIFLOZIN 10 MG/1
10 TABLET, FILM COATED ORAL
Qty: 90 | Refills: 2 | Status: ACTIVE | COMMUNITY
Start: 2025-09-18 | End: 1900-01-01

## 2025-09-19 ENCOUNTER — RESULT REVIEW (OUTPATIENT)
Age: 60
End: 2025-09-19

## 2025-09-19 ENCOUNTER — APPOINTMENT (OUTPATIENT)
Dept: CV DIAGNOSTICS | Facility: HOSPITAL | Age: 60
End: 2025-09-19

## 2025-09-19 LAB
APPEARANCE: CLEAR
BACTERIA: NEGATIVE /HPF
BILIRUBIN URINE: NEGATIVE
BLOOD URINE: NEGATIVE
CAST: 1 /LPF
COLOR: YELLOW
CREAT SPEC-SCNC: 152 MG/DL
CREAT/PROT UR: 0.6 RATIO
EPITHELIAL CELLS: 0 /HPF
GLUCOSE QUALITATIVE U: >=1000 MG/DL
KETONES URINE: NEGATIVE MG/DL
LEUKOCYTE ESTERASE URINE: NEGATIVE
MICROSCOPIC-UA: NORMAL
NITRITE URINE: NEGATIVE
PH URINE: 5.5
PROT UR-MCNC: 85 MG/DL
PROTEIN URINE: 100 MG/DL
RED BLOOD CELLS URINE: 3 /HPF
SPECIFIC GRAVITY URINE: >1.03
UROBILINOGEN URINE: 0.2 MG/DL
WHITE BLOOD CELLS URINE: 3 /HPF

## 2025-09-22 LAB — BACTERIA UR CULT: NORMAL
